# Patient Record
Sex: FEMALE | Race: WHITE | Employment: OTHER | URBAN - NONMETROPOLITAN AREA
[De-identification: names, ages, dates, MRNs, and addresses within clinical notes are randomized per-mention and may not be internally consistent; named-entity substitution may affect disease eponyms.]

---

## 2018-11-06 ENCOUNTER — HOSPITAL ENCOUNTER (OUTPATIENT)
Dept: NUCLEAR MEDICINE | Age: 43
Discharge: HOME | End: 2018-11-08
Payer: MEDICAID

## 2018-11-06 DIAGNOSIS — R91.1 SOLITARY LUNG NODULE: ICD-10-CM

## 2018-11-06 LAB
GLUCOSE BLD-MCNC: 154 MG/DL (ref 70–99)
PERFORMED ON: ABNORMAL

## 2018-11-06 PROCEDURE — 3430000000 HC RX DIAGNOSTIC RADIOPHARMACEUTICAL: Performed by: NURSE PRACTITIONER

## 2018-11-06 PROCEDURE — 82948 REAGENT STRIP/BLOOD GLUCOSE: CPT

## 2018-11-06 PROCEDURE — 78815 PET IMAGE W/CT SKULL-THIGH: CPT

## 2018-11-06 PROCEDURE — A9552 F18 FDG: HCPCS | Performed by: NURSE PRACTITIONER

## 2018-11-06 RX ORDER — FLUDEOXYGLUCOSE F 18 200 MCI/ML
10 INJECTION, SOLUTION INTRAVENOUS
Status: COMPLETED | OUTPATIENT
Start: 2018-11-06 | End: 2018-11-06

## 2018-11-06 RX ADMIN — FLUDEOXYGLUCOSE F 18 10 MILLICURIE: 200 INJECTION, SOLUTION INTRAVENOUS at 12:20

## 2019-01-19 ENCOUNTER — OUTSIDE FACILITY SERVICE (OUTPATIENT)
Dept: CARDIOLOGY | Facility: CLINIC | Age: 44
End: 2019-01-19

## 2019-01-19 PROCEDURE — 93306 TTE W/DOPPLER COMPLETE: CPT | Performed by: INTERNAL MEDICINE

## 2019-01-21 ENCOUNTER — OUTSIDE FACILITY SERVICE (OUTPATIENT)
Dept: CARDIOLOGY | Facility: CLINIC | Age: 44
End: 2019-01-21

## 2019-01-21 PROCEDURE — 99222 1ST HOSP IP/OBS MODERATE 55: CPT | Performed by: NURSE PRACTITIONER

## 2019-01-22 PROCEDURE — 99231 SBSQ HOSP IP/OBS SF/LOW 25: CPT | Performed by: NURSE PRACTITIONER

## 2019-01-23 PROCEDURE — 99231 SBSQ HOSP IP/OBS SF/LOW 25: CPT | Performed by: INTERNAL MEDICINE

## 2019-01-24 PROCEDURE — 99231 SBSQ HOSP IP/OBS SF/LOW 25: CPT | Performed by: INTERNAL MEDICINE

## 2019-01-25 ENCOUNTER — APPOINTMENT (OUTPATIENT)
Dept: CT IMAGING | Facility: HOSPITAL | Age: 44
End: 2019-01-25

## 2019-01-25 ENCOUNTER — HOSPITAL ENCOUNTER (INPATIENT)
Facility: HOSPITAL | Age: 44
LOS: 5 days | Discharge: HOME | End: 2019-01-30
Attending: FAMILY MEDICINE | Admitting: INTERNAL MEDICINE

## 2019-01-25 ENCOUNTER — APPOINTMENT (OUTPATIENT)
Dept: GENERAL RADIOLOGY | Facility: HOSPITAL | Age: 44
End: 2019-01-25

## 2019-01-25 DIAGNOSIS — Z74.09 IMPAIRED FUNCTIONAL MOBILITY AND ACTIVITY TOLERANCE: ICD-10-CM

## 2019-01-25 PROBLEM — E66.01 MORBID OBESITY WITH BMI OF 45.0-49.9, ADULT (HCC): Chronic | Status: ACTIVE | Noted: 2019-01-25

## 2019-01-25 PROBLEM — S30.1XXA RECTUS SHEATH HEMATOMA, INITIAL ENCOUNTER: Status: ACTIVE | Noted: 2019-01-25

## 2019-01-25 PROBLEM — IMO0001 TYPE 2 DM WITH CKD AND HYPERTENSION: Chronic | Status: ACTIVE | Noted: 2019-01-25

## 2019-01-25 LAB
ABO GROUP BLD: NORMAL
ALBUMIN SERPL-MCNC: 3.7 G/DL (ref 3.5–5)
ALBUMIN/GLOB SERPL: 1.2 G/DL (ref 1.1–2.5)
ALP SERPL-CCNC: 113 U/L (ref 24–120)
ALT SERPL W P-5'-P-CCNC: 23 U/L (ref 0–54)
ANION GAP SERPL CALCULATED.3IONS-SCNC: 10 MMOL/L (ref 4–13)
APTT PPP: 26.4 SECONDS (ref 24.1–34.8)
AST SERPL-CCNC: 33 U/L (ref 7–45)
BACTERIA UR QL AUTO: ABNORMAL /HPF
BILIRUB SERPL-MCNC: 1.1 MG/DL (ref 0.1–1)
BILIRUB UR QL STRIP: NEGATIVE
BLD GP AB SCN SERPL QL: NEGATIVE
BUN BLD-MCNC: 32 MG/DL (ref 5–21)
BUN/CREAT SERPL: 34.4 (ref 7–25)
CALCIUM SPEC-SCNC: 9 MG/DL (ref 8.4–10.4)
CHLORIDE SERPL-SCNC: 91 MMOL/L (ref 98–110)
CLARITY UR: CLEAR
CO2 SERPL-SCNC: 31 MMOL/L (ref 24–31)
COLOR UR: YELLOW
CREAT BLD-MCNC: 0.93 MG/DL (ref 0.5–1.4)
DEPRECATED RDW RBC AUTO: 48.3 FL (ref 40–54)
ERYTHROCYTE [DISTWIDTH] IN BLOOD BY AUTOMATED COUNT: 15.6 % (ref 12–15)
GFR SERPL CREATININE-BSD FRML MDRD: 65 ML/MIN/1.73
GLOBULIN UR ELPH-MCNC: 3.2 GM/DL
GLUCOSE BLD-MCNC: 283 MG/DL (ref 70–100)
GLUCOSE UR STRIP-MCNC: NEGATIVE MG/DL
HCT VFR BLD AUTO: 30.6 % (ref 37–47)
HGB BLD-MCNC: 10.1 G/DL (ref 12–16)
HGB UR QL STRIP.AUTO: ABNORMAL
HYALINE CASTS UR QL AUTO: ABNORMAL /LPF
INR PPP: 1.04 (ref 0.91–1.09)
KETONES UR QL STRIP: NEGATIVE
LEUKOCYTE ESTERASE UR QL STRIP.AUTO: ABNORMAL
MAGNESIUM SERPL-MCNC: 1.9 MG/DL (ref 1.4–2.2)
MCH RBC QN AUTO: 28.5 PG (ref 28–32)
MCHC RBC AUTO-ENTMCNC: 33 G/DL (ref 33–36)
MCV RBC AUTO: 86.4 FL (ref 82–98)
NITRITE UR QL STRIP: NEGATIVE
PH UR STRIP.AUTO: 6 [PH] (ref 5–8)
PHOSPHATE SERPL-MCNC: 3.5 MG/DL (ref 2.5–4.5)
PLATELET # BLD AUTO: 381 10*3/MM3 (ref 130–400)
PMV BLD AUTO: 10.7 FL (ref 6–12)
POTASSIUM BLD-SCNC: 4.7 MMOL/L (ref 3.5–5.3)
PROT SERPL-MCNC: 6.9 G/DL (ref 6.3–8.7)
PROT UR QL STRIP: ABNORMAL
PROTHROMBIN TIME: 13.9 SECONDS (ref 11.9–14.6)
RBC # BLD AUTO: 3.54 10*6/MM3 (ref 4.2–5.4)
RBC # UR: ABNORMAL /HPF
REF LAB TEST METHOD: ABNORMAL
RH BLD: NEGATIVE
SODIUM BLD-SCNC: 132 MMOL/L (ref 135–145)
SP GR UR STRIP: 1.02 (ref 1–1.03)
SQUAMOUS #/AREA URNS HPF: ABNORMAL /HPF
T&S EXPIRATION DATE: NORMAL
UROBILINOGEN UR QL STRIP: ABNORMAL
WBC NRBC COR # BLD: 24.96 10*3/MM3 (ref 4.8–10.8)
WBC UR QL AUTO: ABNORMAL /HPF

## 2019-01-25 PROCEDURE — 81001 URINALYSIS AUTO W/SCOPE: CPT | Performed by: INTERNAL MEDICINE

## 2019-01-25 PROCEDURE — 86901 BLOOD TYPING SEROLOGIC RH(D): CPT | Performed by: INTERNAL MEDICINE

## 2019-01-25 PROCEDURE — 74174 CTA ABD&PLVS W/CONTRAST: CPT

## 2019-01-25 PROCEDURE — 0 IOPAMIDOL PER 1 ML: Performed by: SURGERY

## 2019-01-25 PROCEDURE — 80053 COMPREHEN METABOLIC PANEL: CPT | Performed by: INTERNAL MEDICINE

## 2019-01-25 PROCEDURE — 83880 ASSAY OF NATRIURETIC PEPTIDE: CPT | Performed by: INTERNAL MEDICINE

## 2019-01-25 PROCEDURE — 86850 RBC ANTIBODY SCREEN: CPT | Performed by: INTERNAL MEDICINE

## 2019-01-25 PROCEDURE — 85027 COMPLETE CBC AUTOMATED: CPT | Performed by: INTERNAL MEDICINE

## 2019-01-25 PROCEDURE — 86900 BLOOD TYPING SEROLOGIC ABO: CPT | Performed by: INTERNAL MEDICINE

## 2019-01-25 PROCEDURE — 71045 X-RAY EXAM CHEST 1 VIEW: CPT

## 2019-01-25 PROCEDURE — 84100 ASSAY OF PHOSPHORUS: CPT | Performed by: INTERNAL MEDICINE

## 2019-01-25 PROCEDURE — 85730 THROMBOPLASTIN TIME PARTIAL: CPT | Performed by: INTERNAL MEDICINE

## 2019-01-25 PROCEDURE — 93010 ELECTROCARDIOGRAM REPORT: CPT | Performed by: INTERNAL MEDICINE

## 2019-01-25 PROCEDURE — 93005 ELECTROCARDIOGRAM TRACING: CPT | Performed by: INTERNAL MEDICINE

## 2019-01-25 PROCEDURE — 83735 ASSAY OF MAGNESIUM: CPT | Performed by: INTERNAL MEDICINE

## 2019-01-25 PROCEDURE — 85610 PROTHROMBIN TIME: CPT | Performed by: INTERNAL MEDICINE

## 2019-01-25 RX ORDER — ONDANSETRON 2 MG/ML
4 INJECTION INTRAMUSCULAR; INTRAVENOUS EVERY 6 HOURS PRN
Status: DISCONTINUED | OUTPATIENT
Start: 2019-01-25 | End: 2019-01-30 | Stop reason: HOSPADM

## 2019-01-25 RX ORDER — DEXTROSE MONOHYDRATE 50 MG/ML
100 INJECTION, SOLUTION INTRAVENOUS CONTINUOUS
Status: DISCONTINUED | OUTPATIENT
Start: 2019-01-25 | End: 2019-01-26

## 2019-01-25 RX ORDER — HYDROXYZINE PAMOATE 25 MG/1
25 CAPSULE ORAL 3 TIMES DAILY PRN
COMMUNITY
Start: 2017-11-24

## 2019-01-25 RX ORDER — TRAZODONE HYDROCHLORIDE 100 MG/1
100 TABLET ORAL DAILY
COMMUNITY
Start: 2017-11-24

## 2019-01-25 RX ORDER — GABAPENTIN 300 MG/1
600 CAPSULE ORAL DAILY
COMMUNITY
Start: 2017-11-17

## 2019-01-25 RX ORDER — IPRATROPIUM BROMIDE AND ALBUTEROL SULFATE 2.5; .5 MG/3ML; MG/3ML
3 SOLUTION RESPIRATORY (INHALATION) EVERY 6 HOURS PRN
Status: DISCONTINUED | OUTPATIENT
Start: 2019-01-25 | End: 2019-01-30 | Stop reason: HOSPADM

## 2019-01-25 RX ORDER — NICOTINE POLACRILEX 4 MG
15 LOZENGE BUCCAL
Status: DISCONTINUED | OUTPATIENT
Start: 2019-01-25 | End: 2019-01-30 | Stop reason: HOSPADM

## 2019-01-25 RX ORDER — BUDESONIDE AND FORMOTEROL FUMARATE DIHYDRATE 160; 4.5 UG/1; UG/1
2 AEROSOL RESPIRATORY (INHALATION)
COMMUNITY

## 2019-01-25 RX ORDER — SERTRALINE HYDROCHLORIDE 100 MG/1
100 TABLET, FILM COATED ORAL DAILY
COMMUNITY
Start: 2017-11-17

## 2019-01-25 RX ORDER — IPRATROPIUM BROMIDE AND ALBUTEROL SULFATE 2.5; .5 MG/3ML; MG/3ML
3 SOLUTION RESPIRATORY (INHALATION)
Status: DISCONTINUED | OUTPATIENT
Start: 2019-01-26 | End: 2019-01-26

## 2019-01-25 RX ORDER — SODIUM CHLORIDE 0.9 % (FLUSH) 0.9 %
3 SYRINGE (ML) INJECTION EVERY 12 HOURS SCHEDULED
Status: DISCONTINUED | OUTPATIENT
Start: 2019-01-25 | End: 2019-01-30 | Stop reason: HOSPADM

## 2019-01-25 RX ORDER — CETIRIZINE HYDROCHLORIDE 10 MG/1
10 TABLET ORAL DAILY
COMMUNITY
Start: 2017-12-12

## 2019-01-25 RX ORDER — ACETAMINOPHEN 325 MG/1
650 TABLET ORAL EVERY 4 HOURS PRN
Status: DISCONTINUED | OUTPATIENT
Start: 2019-01-25 | End: 2019-01-30 | Stop reason: HOSPADM

## 2019-01-25 RX ORDER — METOPROLOL SUCCINATE 25 MG/1
25 TABLET, EXTENDED RELEASE ORAL DAILY
COMMUNITY
Start: 2017-11-17

## 2019-01-25 RX ORDER — SIMVASTATIN 80 MG
80 TABLET ORAL DAILY
COMMUNITY
Start: 2017-12-08

## 2019-01-25 RX ORDER — CYCLOBENZAPRINE HCL 5 MG
5 TABLET ORAL 3 TIMES DAILY
COMMUNITY
Start: 2017-11-21

## 2019-01-25 RX ORDER — ONDANSETRON 4 MG/1
4 TABLET, FILM COATED ORAL EVERY 6 HOURS PRN
Status: DISCONTINUED | OUTPATIENT
Start: 2019-01-25 | End: 2019-01-30 | Stop reason: HOSPADM

## 2019-01-25 RX ORDER — PANTOPRAZOLE SODIUM 40 MG/1
40 TABLET, DELAYED RELEASE ORAL DAILY
COMMUNITY

## 2019-01-25 RX ORDER — GLIPIZIDE 5 MG/1
5 TABLET ORAL DAILY
COMMUNITY
Start: 2017-11-29

## 2019-01-25 RX ORDER — ACETAMINOPHEN 650 MG/1
650 SUPPOSITORY RECTAL EVERY 4 HOURS PRN
Status: DISCONTINUED | OUTPATIENT
Start: 2019-01-25 | End: 2019-01-30 | Stop reason: HOSPADM

## 2019-01-25 RX ORDER — LISINOPRIL 10 MG/1
10 TABLET ORAL DAILY
COMMUNITY

## 2019-01-25 RX ORDER — PREDNISONE 20 MG/1
20 TABLET ORAL DAILY
COMMUNITY
End: 2019-01-30 | Stop reason: HOSPADM

## 2019-01-25 RX ORDER — SODIUM CHLORIDE 0.9 % (FLUSH) 0.9 %
3-10 SYRINGE (ML) INJECTION AS NEEDED
Status: DISCONTINUED | OUTPATIENT
Start: 2019-01-25 | End: 2019-01-30 | Stop reason: HOSPADM

## 2019-01-25 RX ORDER — DOCUSATE SODIUM 100 MG/1
100 CAPSULE, LIQUID FILLED ORAL 2 TIMES DAILY PRN
Status: DISCONTINUED | OUTPATIENT
Start: 2019-01-25 | End: 2019-01-30 | Stop reason: HOSPADM

## 2019-01-25 RX ORDER — CEFDINIR 300 MG/1
300 CAPSULE ORAL 2 TIMES DAILY
COMMUNITY
End: 2019-01-30 | Stop reason: HOSPADM

## 2019-01-25 RX ORDER — ONDANSETRON 4 MG/1
4 TABLET, ORALLY DISINTEGRATING ORAL EVERY 6 HOURS PRN
Status: DISCONTINUED | OUTPATIENT
Start: 2019-01-25 | End: 2019-01-30 | Stop reason: HOSPADM

## 2019-01-25 RX ORDER — LEVALBUTEROL INHALATION SOLUTION 1.25 MG/3ML
1 SOLUTION RESPIRATORY (INHALATION) EVERY 8 HOURS PRN
COMMUNITY

## 2019-01-25 RX ORDER — DEXTROSE MONOHYDRATE 25 G/50ML
25 INJECTION, SOLUTION INTRAVENOUS
Status: DISCONTINUED | OUTPATIENT
Start: 2019-01-25 | End: 2019-01-30 | Stop reason: HOSPADM

## 2019-01-25 RX ADMIN — IOPAMIDOL 128 ML: 755 INJECTION, SOLUTION INTRAVENOUS at 22:18

## 2019-01-26 LAB
ALBUMIN SERPL-MCNC: 3.2 G/DL (ref 3.5–5)
ALBUMIN/GLOB SERPL: 1.1 G/DL (ref 1.1–2.5)
ALP SERPL-CCNC: 99 U/L (ref 24–120)
ALT SERPL W P-5'-P-CCNC: 27 U/L (ref 0–54)
ANION GAP SERPL CALCULATED.3IONS-SCNC: 12 MMOL/L (ref 4–13)
AST SERPL-CCNC: 23 U/L (ref 7–45)
BASOPHILS # BLD AUTO: 0.06 10*3/MM3 (ref 0–0.2)
BASOPHILS NFR BLD AUTO: 0.3 % (ref 0–2)
BILIRUB SERPL-MCNC: 1 MG/DL (ref 0.1–1)
BUN BLD-MCNC: 28 MG/DL (ref 5–21)
BUN/CREAT SERPL: 38.9 (ref 7–25)
CALCIUM SPEC-SCNC: 9 MG/DL (ref 8.4–10.4)
CHLORIDE SERPL-SCNC: 94 MMOL/L (ref 98–110)
CO2 SERPL-SCNC: 31 MMOL/L (ref 24–31)
CREAT BLD-MCNC: 0.72 MG/DL (ref 0.5–1.4)
DEPRECATED RDW RBC AUTO: 49.1 FL (ref 40–54)
EOSINOPHIL # BLD AUTO: 0.22 10*3/MM3 (ref 0–0.7)
EOSINOPHIL NFR BLD AUTO: 1 % (ref 0–4)
ERYTHROCYTE [DISTWIDTH] IN BLOOD BY AUTOMATED COUNT: 15.9 % (ref 12–15)
GFR SERPL CREATININE-BSD FRML MDRD: 88 ML/MIN/1.73
GLOBULIN UR ELPH-MCNC: 2.8 GM/DL
GLUCOSE BLD-MCNC: 186 MG/DL (ref 70–100)
GLUCOSE BLDC GLUCOMTR-MCNC: 161 MG/DL (ref 70–130)
GLUCOSE BLDC GLUCOMTR-MCNC: 177 MG/DL (ref 70–130)
GLUCOSE BLDC GLUCOMTR-MCNC: 232 MG/DL (ref 70–130)
GLUCOSE BLDC GLUCOMTR-MCNC: 301 MG/DL (ref 70–130)
GLUCOSE BLDC GLUCOMTR-MCNC: 322 MG/DL (ref 70–130)
HCT VFR BLD AUTO: 28.8 % (ref 37–47)
HCT VFR BLD AUTO: 29.6 % (ref 37–47)
HCT VFR BLD AUTO: 30.2 % (ref 37–47)
HCT VFR BLD AUTO: 30.9 % (ref 37–47)
HGB BLD-MCNC: 10 G/DL (ref 12–16)
HGB BLD-MCNC: 9.5 G/DL (ref 12–16)
HGB BLD-MCNC: 9.7 G/DL (ref 12–16)
HGB BLD-MCNC: 9.9 G/DL (ref 12–16)
IMM GRANULOCYTES # BLD AUTO: 0.21 10*3/MM3 (ref 0–0.03)
IMM GRANULOCYTES NFR BLD AUTO: 0.9 % (ref 0–5)
LYMPHOCYTES # BLD AUTO: 3.78 10*3/MM3 (ref 0.72–4.86)
LYMPHOCYTES NFR BLD AUTO: 17.1 % (ref 15–45)
MCH RBC QN AUTO: 28.6 PG (ref 28–32)
MCHC RBC AUTO-ENTMCNC: 32.8 G/DL (ref 33–36)
MCV RBC AUTO: 87.3 FL (ref 82–98)
MONOCYTES # BLD AUTO: 1.75 10*3/MM3 (ref 0.19–1.3)
MONOCYTES NFR BLD AUTO: 7.9 % (ref 4–12)
NEUTROPHILS # BLD AUTO: 16.11 10*3/MM3 (ref 1.87–8.4)
NEUTROPHILS NFR BLD AUTO: 72.8 % (ref 39–78)
NRBC BLD AUTO-RTO: 0 /100 WBC (ref 0–0)
NT-PROBNP SERPL-MCNC: 758 PG/ML (ref 0–450)
PHOSPHATE SERPL-MCNC: 3.6 MG/DL (ref 2.5–4.5)
PLATELET # BLD AUTO: 406 10*3/MM3 (ref 130–400)
PMV BLD AUTO: 10.5 FL (ref 6–12)
POTASSIUM BLD-SCNC: 4.4 MMOL/L (ref 3.5–5.3)
PROT SERPL-MCNC: 6 G/DL (ref 6.3–8.7)
RBC # BLD AUTO: 3.46 10*6/MM3 (ref 4.2–5.4)
SODIUM BLD-SCNC: 137 MMOL/L (ref 135–145)
WBC NRBC COR # BLD: 22.13 10*3/MM3 (ref 4.8–10.8)

## 2019-01-26 PROCEDURE — 94760 N-INVAS EAR/PLS OXIMETRY 1: CPT

## 2019-01-26 PROCEDURE — 25010000002 HYDROMORPHONE PER 4 MG: Performed by: INTERNAL MEDICINE

## 2019-01-26 PROCEDURE — 85014 HEMATOCRIT: CPT | Performed by: INTERNAL MEDICINE

## 2019-01-26 PROCEDURE — 94799 UNLISTED PULMONARY SVC/PX: CPT

## 2019-01-26 PROCEDURE — 82962 GLUCOSE BLOOD TEST: CPT

## 2019-01-26 PROCEDURE — 85025 COMPLETE CBC W/AUTO DIFF WBC: CPT | Performed by: INTERNAL MEDICINE

## 2019-01-26 PROCEDURE — 85018 HEMOGLOBIN: CPT | Performed by: INTERNAL MEDICINE

## 2019-01-26 PROCEDURE — 63710000001 INSULIN LISPRO (HUMAN) PER 5 UNITS: Performed by: INTERNAL MEDICINE

## 2019-01-26 PROCEDURE — 80053 COMPREHEN METABOLIC PANEL: CPT | Performed by: INTERNAL MEDICINE

## 2019-01-26 PROCEDURE — 84100 ASSAY OF PHOSPHORUS: CPT | Performed by: INTERNAL MEDICINE

## 2019-01-26 PROCEDURE — 63710000001 PREDNISONE PER 1 MG: Performed by: INTERNAL MEDICINE

## 2019-01-26 PROCEDURE — 94762 N-INVAS EAR/PLS OXIMTRY CONT: CPT

## 2019-01-26 PROCEDURE — 93005 ELECTROCARDIOGRAM TRACING: CPT | Performed by: INTERNAL MEDICINE

## 2019-01-26 PROCEDURE — 99222 1ST HOSP IP/OBS MODERATE 55: CPT | Performed by: SURGERY

## 2019-01-26 PROCEDURE — 63710000001 INSULIN DETEMIR PER 5 UNITS: Performed by: INTERNAL MEDICINE

## 2019-01-26 PROCEDURE — 94640 AIRWAY INHALATION TREATMENT: CPT

## 2019-01-26 PROCEDURE — 93010 ELECTROCARDIOGRAM REPORT: CPT | Performed by: INTERNAL MEDICINE

## 2019-01-26 RX ORDER — PREDNISONE 20 MG/1
20 TABLET ORAL DAILY
Status: DISCONTINUED | OUTPATIENT
Start: 2019-01-26 | End: 2019-01-29

## 2019-01-26 RX ORDER — SERTRALINE HYDROCHLORIDE 100 MG/1
100 TABLET, FILM COATED ORAL DAILY
Status: DISCONTINUED | OUTPATIENT
Start: 2019-01-26 | End: 2019-01-30 | Stop reason: HOSPADM

## 2019-01-26 RX ORDER — CEFDINIR 300 MG/1
300 CAPSULE ORAL EVERY 12 HOURS SCHEDULED
Status: COMPLETED | OUTPATIENT
Start: 2019-01-26 | End: 2019-01-28

## 2019-01-26 RX ORDER — HYDROXYZINE PAMOATE 25 MG/1
25 CAPSULE ORAL 4 TIMES DAILY PRN
Status: DISCONTINUED | OUTPATIENT
Start: 2019-01-25 | End: 2019-01-26 | Stop reason: SDUPTHER

## 2019-01-26 RX ORDER — METOPROLOL SUCCINATE 50 MG/1
50 TABLET, EXTENDED RELEASE ORAL DAILY
Status: DISCONTINUED | OUTPATIENT
Start: 2019-01-26 | End: 2019-01-27

## 2019-01-26 RX ORDER — GLIPIZIDE 5 MG/1
5 TABLET ORAL DAILY
Status: DISCONTINUED | OUTPATIENT
Start: 2019-01-26 | End: 2019-01-30 | Stop reason: HOSPADM

## 2019-01-26 RX ORDER — CYCLOBENZAPRINE HCL 5 MG
5 TABLET ORAL 3 TIMES DAILY
Status: DISCONTINUED | OUTPATIENT
Start: 2019-01-26 | End: 2019-01-30 | Stop reason: HOSPADM

## 2019-01-26 RX ORDER — SODIUM CHLORIDE 9 MG/ML
100 INJECTION, SOLUTION INTRAVENOUS CONTINUOUS
Status: DISCONTINUED | OUTPATIENT
Start: 2019-01-26 | End: 2019-01-26

## 2019-01-26 RX ORDER — BUDESONIDE AND FORMOTEROL FUMARATE DIHYDRATE 160; 4.5 UG/1; UG/1
2 AEROSOL RESPIRATORY (INHALATION)
Status: DISCONTINUED | OUTPATIENT
Start: 2019-01-26 | End: 2019-01-30 | Stop reason: HOSPADM

## 2019-01-26 RX ORDER — CETIRIZINE HYDROCHLORIDE 10 MG/1
10 TABLET ORAL DAILY
Status: DISCONTINUED | OUTPATIENT
Start: 2019-01-26 | End: 2019-01-30 | Stop reason: HOSPADM

## 2019-01-26 RX ORDER — DILTIAZEM HYDROCHLORIDE 5 MG/ML
10 INJECTION INTRAVENOUS ONCE
Status: COMPLETED | OUTPATIENT
Start: 2019-01-26 | End: 2019-01-26

## 2019-01-26 RX ORDER — PANTOPRAZOLE SODIUM 40 MG/1
40 TABLET, DELAYED RELEASE ORAL DAILY
Status: DISCONTINUED | OUTPATIENT
Start: 2019-01-26 | End: 2019-01-30 | Stop reason: HOSPADM

## 2019-01-26 RX ORDER — ATORVASTATIN CALCIUM 10 MG/1
20 TABLET, FILM COATED ORAL DAILY
Status: DISCONTINUED | OUTPATIENT
Start: 2019-01-26 | End: 2019-01-30 | Stop reason: HOSPADM

## 2019-01-26 RX ORDER — LISINOPRIL 10 MG/1
10 TABLET ORAL DAILY
Status: DISCONTINUED | OUTPATIENT
Start: 2019-01-26 | End: 2019-01-30 | Stop reason: HOSPADM

## 2019-01-26 RX ORDER — GABAPENTIN 300 MG/1
600 CAPSULE ORAL DAILY
Status: DISCONTINUED | OUTPATIENT
Start: 2019-01-26 | End: 2019-01-30 | Stop reason: HOSPADM

## 2019-01-26 RX ORDER — HYDROXYZINE HYDROCHLORIDE 25 MG/1
25 TABLET, FILM COATED ORAL 4 TIMES DAILY PRN
Status: DISCONTINUED | OUTPATIENT
Start: 2019-01-26 | End: 2019-01-30 | Stop reason: HOSPADM

## 2019-01-26 RX ORDER — HYDROMORPHONE HYDROCHLORIDE 1 MG/ML
1 INJECTION, SOLUTION INTRAMUSCULAR; INTRAVENOUS; SUBCUTANEOUS
Status: DISCONTINUED | OUTPATIENT
Start: 2019-01-26 | End: 2019-01-30 | Stop reason: HOSPADM

## 2019-01-26 RX ORDER — LEVALBUTEROL INHALATION SOLUTION 1.25 MG/3ML
3 SOLUTION RESPIRATORY (INHALATION)
Status: DISCONTINUED | OUTPATIENT
Start: 2019-01-26 | End: 2019-01-30 | Stop reason: HOSPADM

## 2019-01-26 RX ORDER — TRAZODONE HYDROCHLORIDE 100 MG/1
100 TABLET ORAL DAILY
Status: DISCONTINUED | OUTPATIENT
Start: 2019-01-26 | End: 2019-01-30 | Stop reason: HOSPADM

## 2019-01-26 RX ORDER — SODIUM CHLORIDE 9 MG/ML
75 INJECTION, SOLUTION INTRAVENOUS CONTINUOUS
Status: DISCONTINUED | OUTPATIENT
Start: 2019-01-26 | End: 2019-01-28

## 2019-01-26 RX ADMIN — DILTIAZEM HYDROCHLORIDE 30 MG: 30 TABLET, FILM COATED ORAL at 00:48

## 2019-01-26 RX ADMIN — BUDESONIDE AND FORMOTEROL FUMARATE DIHYDRATE 2 PUFF: 160; 4.5 AEROSOL RESPIRATORY (INHALATION) at 19:17

## 2019-01-26 RX ADMIN — INSULIN LISPRO 3 UNITS: 100 INJECTION, SOLUTION INTRAVENOUS; SUBCUTANEOUS at 08:19

## 2019-01-26 RX ADMIN — CYCLOBENZAPRINE HYDROCHLORIDE 5 MG: 5 TABLET, FILM COATED ORAL at 15:43

## 2019-01-26 RX ADMIN — LEVALBUTEROL HYDROCHLORIDE 3 ML: 1.25 SOLUTION RESPIRATORY (INHALATION) at 07:00

## 2019-01-26 RX ADMIN — CEFDINIR 300 MG: 300 CAPSULE ORAL at 08:20

## 2019-01-26 RX ADMIN — DILTIAZEM HYDROCHLORIDE 10 MG: 5 INJECTION INTRAVENOUS at 02:15

## 2019-01-26 RX ADMIN — INSULIN LISPRO 3 UNITS: 100 INJECTION, SOLUTION INTRAVENOUS; SUBCUTANEOUS at 13:06

## 2019-01-26 RX ADMIN — INSULIN DETEMIR 40 UNITS: 100 INJECTION, SOLUTION SUBCUTANEOUS at 00:52

## 2019-01-26 RX ADMIN — CYCLOBENZAPRINE HYDROCHLORIDE 5 MG: 5 TABLET, FILM COATED ORAL at 08:19

## 2019-01-26 RX ADMIN — ATORVASTATIN CALCIUM 20 MG: 10 TABLET, FILM COATED ORAL at 08:20

## 2019-01-26 RX ADMIN — CEFDINIR 300 MG: 300 CAPSULE ORAL at 21:37

## 2019-01-26 RX ADMIN — METOPROLOL SUCCINATE 50 MG: 50 TABLET, FILM COATED, EXTENDED RELEASE ORAL at 08:36

## 2019-01-26 RX ADMIN — HYDROMORPHONE HYDROCHLORIDE 1 MG: 1 INJECTION, SOLUTION INTRAMUSCULAR; INTRAVENOUS; SUBCUTANEOUS at 18:45

## 2019-01-26 RX ADMIN — INSULIN DETEMIR 40 UNITS: 100 INJECTION, SOLUTION SUBCUTANEOUS at 21:31

## 2019-01-26 RX ADMIN — GLIPIZIDE 5 MG: 5 TABLET ORAL at 08:36

## 2019-01-26 RX ADMIN — SERTRALINE 100 MG: 100 TABLET, FILM COATED ORAL at 08:37

## 2019-01-26 RX ADMIN — LEVALBUTEROL HYDROCHLORIDE 3 ML: 1.25 SOLUTION RESPIRATORY (INHALATION) at 19:16

## 2019-01-26 RX ADMIN — HYDROMORPHONE HYDROCHLORIDE 1 MG: 1 INJECTION, SOLUTION INTRAMUSCULAR; INTRAVENOUS; SUBCUTANEOUS at 00:05

## 2019-01-26 RX ADMIN — PANTOPRAZOLE SODIUM 40 MG: 40 TABLET, DELAYED RELEASE ORAL at 08:46

## 2019-01-26 RX ADMIN — INSULIN LISPRO 5 UNITS: 100 INJECTION, SOLUTION INTRAVENOUS; SUBCUTANEOUS at 21:31

## 2019-01-26 RX ADMIN — SODIUM CHLORIDE 75 ML/HR: 9 INJECTION, SOLUTION INTRAVENOUS at 14:33

## 2019-01-26 RX ADMIN — INSULIN LISPRO 10 UNITS: 100 INJECTION, SOLUTION INTRAVENOUS; SUBCUTANEOUS at 17:15

## 2019-01-26 RX ADMIN — CEFDINIR 300 MG: 300 CAPSULE ORAL at 00:48

## 2019-01-26 RX ADMIN — PREDNISONE 20 MG: 20 TABLET ORAL at 08:37

## 2019-01-26 RX ADMIN — SODIUM CHLORIDE 100 ML/HR: 9 INJECTION, SOLUTION INTRAVENOUS at 00:48

## 2019-01-26 RX ADMIN — LEVALBUTEROL HYDROCHLORIDE 3 ML: 1.25 SOLUTION RESPIRATORY (INHALATION) at 13:23

## 2019-01-26 RX ADMIN — IPRATROPIUM BROMIDE AND ALBUTEROL SULFATE 3 ML: 2.5; .5 SOLUTION RESPIRATORY (INHALATION) at 01:04

## 2019-01-26 RX ADMIN — HYDROMORPHONE HYDROCHLORIDE 1 MG: 1 INJECTION, SOLUTION INTRAMUSCULAR; INTRAVENOUS; SUBCUTANEOUS at 21:31

## 2019-01-26 RX ADMIN — TRAZODONE HYDROCHLORIDE 100 MG: 100 TABLET ORAL at 08:46

## 2019-01-26 RX ADMIN — LEVALBUTEROL HYDROCHLORIDE 3 ML: 1.25 SOLUTION RESPIRATORY (INHALATION) at 02:55

## 2019-01-26 RX ADMIN — CYCLOBENZAPRINE HYDROCHLORIDE 5 MG: 5 TABLET, FILM COATED ORAL at 21:37

## 2019-01-26 RX ADMIN — GABAPENTIN 600 MG: 300 CAPSULE ORAL at 08:19

## 2019-01-26 RX ADMIN — SODIUM CHLORIDE, PRESERVATIVE FREE 3 ML: 5 INJECTION INTRAVENOUS at 08:42

## 2019-01-26 RX ADMIN — LISINOPRIL 10 MG: 10 TABLET ORAL at 08:19

## 2019-01-26 RX ADMIN — HYDROMORPHONE HYDROCHLORIDE 1 MG: 1 INJECTION, SOLUTION INTRAMUSCULAR; INTRAVENOUS; SUBCUTANEOUS at 08:36

## 2019-01-26 RX ADMIN — HYDROMORPHONE HYDROCHLORIDE 1 MG: 1 INJECTION, SOLUTION INTRAMUSCULAR; INTRAVENOUS; SUBCUTANEOUS at 14:28

## 2019-01-26 RX ADMIN — CETIRIZINE HYDROCHLORIDE 10 MG: 10 TABLET, FILM COATED ORAL at 08:20

## 2019-01-27 LAB
BASOPHILS # BLD AUTO: 0.06 10*3/MM3 (ref 0–0.2)
BASOPHILS NFR BLD AUTO: 0.3 % (ref 0–2)
DEPRECATED RDW RBC AUTO: 52 FL (ref 40–54)
EOSINOPHIL # BLD AUTO: 0.35 10*3/MM3 (ref 0–0.7)
EOSINOPHIL NFR BLD AUTO: 1.8 % (ref 0–4)
ERYTHROCYTE [DISTWIDTH] IN BLOOD BY AUTOMATED COUNT: 16.1 % (ref 12–15)
GLUCOSE BLDC GLUCOMTR-MCNC: 126 MG/DL (ref 70–130)
GLUCOSE BLDC GLUCOMTR-MCNC: 195 MG/DL (ref 70–130)
GLUCOSE BLDC GLUCOMTR-MCNC: 279 MG/DL (ref 70–130)
GLUCOSE BLDC GLUCOMTR-MCNC: 295 MG/DL (ref 70–130)
HCT VFR BLD AUTO: 29.7 % (ref 37–47)
HCT VFR BLD AUTO: 29.8 % (ref 37–47)
HCT VFR BLD AUTO: 30.9 % (ref 37–47)
HCT VFR BLD AUTO: 31 % (ref 37–47)
HCT VFR BLD AUTO: 31 % (ref 37–47)
HGB BLD-MCNC: 9.5 G/DL (ref 12–16)
HGB BLD-MCNC: 9.6 G/DL (ref 12–16)
HGB BLD-MCNC: 9.6 G/DL (ref 12–16)
HGB BLD-MCNC: 9.7 G/DL (ref 12–16)
HGB BLD-MCNC: 9.7 G/DL (ref 12–16)
IMM GRANULOCYTES # BLD AUTO: 0.17 10*3/MM3 (ref 0–0.03)
IMM GRANULOCYTES NFR BLD AUTO: 0.9 % (ref 0–5)
LYMPHOCYTES # BLD AUTO: 3.27 10*3/MM3 (ref 0.72–4.86)
LYMPHOCYTES NFR BLD AUTO: 16.8 % (ref 15–45)
MCH RBC QN AUTO: 27.9 PG (ref 28–32)
MCHC RBC AUTO-ENTMCNC: 31.3 G/DL (ref 33–36)
MCV RBC AUTO: 89.1 FL (ref 82–98)
MONOCYTES # BLD AUTO: 1.87 10*3/MM3 (ref 0.19–1.3)
MONOCYTES NFR BLD AUTO: 9.6 % (ref 4–12)
NEUTROPHILS # BLD AUTO: 13.8 10*3/MM3 (ref 1.87–8.4)
NEUTROPHILS NFR BLD AUTO: 70.6 % (ref 39–78)
NRBC BLD AUTO-RTO: 0 /100 WBC (ref 0–0)
PLATELET # BLD AUTO: 414 10*3/MM3 (ref 130–400)
PMV BLD AUTO: 9.9 FL (ref 6–12)
RBC # BLD AUTO: 3.48 10*6/MM3 (ref 4.2–5.4)
T4 FREE SERPL-MCNC: 1.2 NG/DL (ref 0.78–2.19)
TSH SERPL DL<=0.05 MIU/L-ACNC: 1.91 MIU/ML (ref 0.47–4.68)
WBC NRBC COR # BLD: 19.52 10*3/MM3 (ref 4.8–10.8)

## 2019-01-27 PROCEDURE — 63710000001 INSULIN LISPRO (HUMAN) PER 5 UNITS: Performed by: INTERNAL MEDICINE

## 2019-01-27 PROCEDURE — 82962 GLUCOSE BLOOD TEST: CPT

## 2019-01-27 PROCEDURE — 84443 ASSAY THYROID STIM HORMONE: CPT | Performed by: INTERNAL MEDICINE

## 2019-01-27 PROCEDURE — 94760 N-INVAS EAR/PLS OXIMETRY 1: CPT

## 2019-01-27 PROCEDURE — 63710000001 INSULIN DETEMIR PER 5 UNITS: Performed by: INTERNAL MEDICINE

## 2019-01-27 PROCEDURE — 94799 UNLISTED PULMONARY SVC/PX: CPT

## 2019-01-27 PROCEDURE — 85018 HEMOGLOBIN: CPT | Performed by: INTERNAL MEDICINE

## 2019-01-27 PROCEDURE — 85025 COMPLETE CBC W/AUTO DIFF WBC: CPT | Performed by: INTERNAL MEDICINE

## 2019-01-27 PROCEDURE — 63710000001 PREDNISONE PER 1 MG: Performed by: INTERNAL MEDICINE

## 2019-01-27 PROCEDURE — 94762 N-INVAS EAR/PLS OXIMTRY CONT: CPT

## 2019-01-27 PROCEDURE — 85014 HEMATOCRIT: CPT | Performed by: INTERNAL MEDICINE

## 2019-01-27 PROCEDURE — 25010000002 HYDROMORPHONE PER 4 MG: Performed by: INTERNAL MEDICINE

## 2019-01-27 PROCEDURE — 99233 SBSQ HOSP IP/OBS HIGH 50: CPT | Performed by: SURGERY

## 2019-01-27 PROCEDURE — 93010 ELECTROCARDIOGRAM REPORT: CPT | Performed by: INTERNAL MEDICINE

## 2019-01-27 PROCEDURE — 84439 ASSAY OF FREE THYROXINE: CPT | Performed by: INTERNAL MEDICINE

## 2019-01-27 PROCEDURE — 93005 ELECTROCARDIOGRAM TRACING: CPT | Performed by: INTERNAL MEDICINE

## 2019-01-27 RX ORDER — METOPROLOL TARTRATE 50 MG/1
50 TABLET, FILM COATED ORAL ONCE
Status: COMPLETED | OUTPATIENT
Start: 2019-01-27 | End: 2019-01-27

## 2019-01-27 RX ORDER — METOPROLOL SUCCINATE 100 MG/1
100 TABLET, EXTENDED RELEASE ORAL DAILY
Status: DISCONTINUED | OUTPATIENT
Start: 2019-01-28 | End: 2019-01-30 | Stop reason: HOSPADM

## 2019-01-27 RX ADMIN — CYCLOBENZAPRINE HYDROCHLORIDE 5 MG: 5 TABLET, FILM COATED ORAL at 15:16

## 2019-01-27 RX ADMIN — GLIPIZIDE 5 MG: 5 TABLET ORAL at 08:12

## 2019-01-27 RX ADMIN — INSULIN LISPRO 8 UNITS: 100 INJECTION, SOLUTION INTRAVENOUS; SUBCUTANEOUS at 17:29

## 2019-01-27 RX ADMIN — HYDROMORPHONE HYDROCHLORIDE 1 MG: 1 INJECTION, SOLUTION INTRAMUSCULAR; INTRAVENOUS; SUBCUTANEOUS at 06:07

## 2019-01-27 RX ADMIN — SERTRALINE 100 MG: 100 TABLET, FILM COATED ORAL at 08:12

## 2019-01-27 RX ADMIN — CEFDINIR 300 MG: 300 CAPSULE ORAL at 20:02

## 2019-01-27 RX ADMIN — HYDROMORPHONE HYDROCHLORIDE 1 MG: 1 INJECTION, SOLUTION INTRAMUSCULAR; INTRAVENOUS; SUBCUTANEOUS at 13:58

## 2019-01-27 RX ADMIN — HYDROMORPHONE HYDROCHLORIDE 1 MG: 1 INJECTION, SOLUTION INTRAMUSCULAR; INTRAVENOUS; SUBCUTANEOUS at 20:02

## 2019-01-27 RX ADMIN — METOPROLOL SUCCINATE 50 MG: 50 TABLET, FILM COATED, EXTENDED RELEASE ORAL at 08:12

## 2019-01-27 RX ADMIN — ATORVASTATIN CALCIUM 20 MG: 10 TABLET, FILM COATED ORAL at 08:12

## 2019-01-27 RX ADMIN — PANTOPRAZOLE SODIUM 40 MG: 40 TABLET, DELAYED RELEASE ORAL at 08:13

## 2019-01-27 RX ADMIN — DILTIAZEM HYDROCHLORIDE 5 MG/HR: 5 INJECTION INTRAVENOUS at 09:14

## 2019-01-27 RX ADMIN — INSULIN DETEMIR 40 UNITS: 100 INJECTION, SOLUTION SUBCUTANEOUS at 20:19

## 2019-01-27 RX ADMIN — SODIUM CHLORIDE 75 ML/HR: 9 INJECTION, SOLUTION INTRAVENOUS at 02:27

## 2019-01-27 RX ADMIN — INSULIN LISPRO 8 UNITS: 100 INJECTION, SOLUTION INTRAVENOUS; SUBCUTANEOUS at 12:14

## 2019-01-27 RX ADMIN — CETIRIZINE HYDROCHLORIDE 10 MG: 10 TABLET, FILM COATED ORAL at 08:12

## 2019-01-27 RX ADMIN — BUDESONIDE AND FORMOTEROL FUMARATE DIHYDRATE 2 PUFF: 160; 4.5 AEROSOL RESPIRATORY (INHALATION) at 18:33

## 2019-01-27 RX ADMIN — TRAZODONE HYDROCHLORIDE 100 MG: 100 TABLET ORAL at 08:12

## 2019-01-27 RX ADMIN — METOPROLOL TARTRATE 50 MG: 50 TABLET ORAL at 15:32

## 2019-01-27 RX ADMIN — BUDESONIDE AND FORMOTEROL FUMARATE DIHYDRATE 2 PUFF: 160; 4.5 AEROSOL RESPIRATORY (INHALATION) at 06:54

## 2019-01-27 RX ADMIN — LEVALBUTEROL HYDROCHLORIDE 3 ML: 1.25 SOLUTION RESPIRATORY (INHALATION) at 06:55

## 2019-01-27 RX ADMIN — HYDROMORPHONE HYDROCHLORIDE 1 MG: 1 INJECTION, SOLUTION INTRAMUSCULAR; INTRAVENOUS; SUBCUTANEOUS at 02:30

## 2019-01-27 RX ADMIN — LEVALBUTEROL HYDROCHLORIDE 3 ML: 1.25 SOLUTION RESPIRATORY (INHALATION) at 18:32

## 2019-01-27 RX ADMIN — CYCLOBENZAPRINE HYDROCHLORIDE 5 MG: 5 TABLET, FILM COATED ORAL at 20:02

## 2019-01-27 RX ADMIN — HYDROMORPHONE HYDROCHLORIDE 1 MG: 1 INJECTION, SOLUTION INTRAMUSCULAR; INTRAVENOUS; SUBCUTANEOUS at 09:05

## 2019-01-27 RX ADMIN — SODIUM CHLORIDE 75 ML/HR: 9 INJECTION, SOLUTION INTRAVENOUS at 15:32

## 2019-01-27 RX ADMIN — PREDNISONE 20 MG: 20 TABLET ORAL at 08:12

## 2019-01-27 RX ADMIN — CEFDINIR 300 MG: 300 CAPSULE ORAL at 08:12

## 2019-01-27 RX ADMIN — HYDROMORPHONE HYDROCHLORIDE 1 MG: 1 INJECTION, SOLUTION INTRAMUSCULAR; INTRAVENOUS; SUBCUTANEOUS at 17:41

## 2019-01-27 RX ADMIN — CYCLOBENZAPRINE HYDROCHLORIDE 5 MG: 5 TABLET, FILM COATED ORAL at 08:12

## 2019-01-27 RX ADMIN — LISINOPRIL 10 MG: 10 TABLET ORAL at 08:12

## 2019-01-27 RX ADMIN — GABAPENTIN 600 MG: 300 CAPSULE ORAL at 08:13

## 2019-01-27 RX ADMIN — INSULIN LISPRO 3 UNITS: 100 INJECTION, SOLUTION INTRAVENOUS; SUBCUTANEOUS at 20:19

## 2019-01-28 LAB
BASOPHILS # BLD AUTO: 0.06 10*3/MM3 (ref 0–0.2)
BASOPHILS NFR BLD AUTO: 0.3 % (ref 0–2)
DEPRECATED RDW RBC AUTO: 50.6 FL (ref 40–54)
EOSINOPHIL # BLD AUTO: 0.42 10*3/MM3 (ref 0–0.7)
EOSINOPHIL NFR BLD AUTO: 2.3 % (ref 0–4)
ERYTHROCYTE [DISTWIDTH] IN BLOOD BY AUTOMATED COUNT: 15.9 % (ref 12–15)
GLUCOSE BLDC GLUCOMTR-MCNC: 175 MG/DL (ref 70–130)
GLUCOSE BLDC GLUCOMTR-MCNC: 216 MG/DL (ref 70–130)
GLUCOSE BLDC GLUCOMTR-MCNC: 227 MG/DL (ref 70–130)
GLUCOSE BLDC GLUCOMTR-MCNC: 249 MG/DL (ref 70–130)
GLUCOSE BLDC GLUCOMTR-MCNC: 256 MG/DL (ref 70–130)
HCT VFR BLD AUTO: 30 % (ref 37–47)
HCT VFR BLD AUTO: 30 % (ref 37–47)
HGB BLD-MCNC: 9.5 G/DL (ref 12–16)
HGB BLD-MCNC: 9.5 G/DL (ref 12–16)
IMM GRANULOCYTES # BLD AUTO: 0.21 10*3/MM3 (ref 0–0.03)
IMM GRANULOCYTES NFR BLD AUTO: 1.1 % (ref 0–5)
LYMPHOCYTES # BLD AUTO: 3.83 10*3/MM3 (ref 0.72–4.86)
LYMPHOCYTES NFR BLD AUTO: 20.8 % (ref 15–45)
MCH RBC QN AUTO: 27.7 PG (ref 28–32)
MCHC RBC AUTO-ENTMCNC: 31.7 G/DL (ref 33–36)
MCV RBC AUTO: 87.5 FL (ref 82–98)
MONOCYTES # BLD AUTO: 1.58 10*3/MM3 (ref 0.19–1.3)
MONOCYTES NFR BLD AUTO: 8.6 % (ref 4–12)
NEUTROPHILS # BLD AUTO: 12.35 10*3/MM3 (ref 1.87–8.4)
NEUTROPHILS NFR BLD AUTO: 66.9 % (ref 39–78)
NRBC BLD AUTO-RTO: 0 /100 WBC (ref 0–0)
PLATELET # BLD AUTO: 453 10*3/MM3 (ref 130–400)
PMV BLD AUTO: 10.2 FL (ref 6–12)
RBC # BLD AUTO: 3.43 10*6/MM3 (ref 4.2–5.4)
WBC NRBC COR # BLD: 18.45 10*3/MM3 (ref 4.8–10.8)

## 2019-01-28 PROCEDURE — 82962 GLUCOSE BLOOD TEST: CPT

## 2019-01-28 PROCEDURE — 25010000002 HYDROMORPHONE PER 4 MG: Performed by: INTERNAL MEDICINE

## 2019-01-28 PROCEDURE — 94760 N-INVAS EAR/PLS OXIMETRY 1: CPT

## 2019-01-28 PROCEDURE — 94799 UNLISTED PULMONARY SVC/PX: CPT

## 2019-01-28 PROCEDURE — 63710000001 PREDNISONE PER 1 MG: Performed by: INTERNAL MEDICINE

## 2019-01-28 PROCEDURE — 97161 PT EVAL LOW COMPLEX 20 MIN: CPT

## 2019-01-28 PROCEDURE — 85025 COMPLETE CBC W/AUTO DIFF WBC: CPT | Performed by: INTERNAL MEDICINE

## 2019-01-28 PROCEDURE — 63710000001 INSULIN LISPRO (HUMAN) PER 5 UNITS: Performed by: INTERNAL MEDICINE

## 2019-01-28 PROCEDURE — 63710000001 INSULIN DETEMIR PER 5 UNITS: Performed by: INTERNAL MEDICINE

## 2019-01-28 RX ORDER — INSULIN GLARGINE 100 [IU]/ML
32 INJECTION, SOLUTION SUBCUTANEOUS NIGHTLY
COMMUNITY

## 2019-01-28 RX ORDER — PIOGLITAZONEHYDROCHLORIDE 30 MG/1
30 TABLET ORAL DAILY
COMMUNITY

## 2019-01-28 RX ORDER — LOSARTAN POTASSIUM 100 MG/1
100 TABLET ORAL DAILY
COMMUNITY
End: 2019-01-30 | Stop reason: HOSPADM

## 2019-01-28 RX ADMIN — HYDROMORPHONE HYDROCHLORIDE 1 MG: 1 INJECTION, SOLUTION INTRAMUSCULAR; INTRAVENOUS; SUBCUTANEOUS at 05:03

## 2019-01-28 RX ADMIN — CEFDINIR 300 MG: 300 CAPSULE ORAL at 20:04

## 2019-01-28 RX ADMIN — GABAPENTIN 600 MG: 300 CAPSULE ORAL at 08:24

## 2019-01-28 RX ADMIN — CYCLOBENZAPRINE HYDROCHLORIDE 5 MG: 5 TABLET, FILM COATED ORAL at 08:25

## 2019-01-28 RX ADMIN — GLIPIZIDE 5 MG: 5 TABLET ORAL at 08:24

## 2019-01-28 RX ADMIN — ATORVASTATIN CALCIUM 20 MG: 10 TABLET, FILM COATED ORAL at 08:25

## 2019-01-28 RX ADMIN — INSULIN LISPRO 8 UNITS: 100 INJECTION, SOLUTION INTRAVENOUS; SUBCUTANEOUS at 20:04

## 2019-01-28 RX ADMIN — HYDROMORPHONE HYDROCHLORIDE 1 MG: 1 INJECTION, SOLUTION INTRAMUSCULAR; INTRAVENOUS; SUBCUTANEOUS at 01:20

## 2019-01-28 RX ADMIN — LISINOPRIL 10 MG: 10 TABLET ORAL at 08:24

## 2019-01-28 RX ADMIN — INSULIN LISPRO 5 UNITS: 100 INJECTION, SOLUTION INTRAVENOUS; SUBCUTANEOUS at 12:21

## 2019-01-28 RX ADMIN — INSULIN LISPRO 3 UNITS: 100 INJECTION, SOLUTION INTRAVENOUS; SUBCUTANEOUS at 08:25

## 2019-01-28 RX ADMIN — SODIUM CHLORIDE 75 ML/HR: 9 INJECTION, SOLUTION INTRAVENOUS at 05:03

## 2019-01-28 RX ADMIN — INSULIN DETEMIR 40 UNITS: 100 INJECTION, SOLUTION SUBCUTANEOUS at 20:03

## 2019-01-28 RX ADMIN — BUDESONIDE AND FORMOTEROL FUMARATE DIHYDRATE 2 PUFF: 160; 4.5 AEROSOL RESPIRATORY (INHALATION) at 19:51

## 2019-01-28 RX ADMIN — BUDESONIDE AND FORMOTEROL FUMARATE DIHYDRATE 2 PUFF: 160; 4.5 AEROSOL RESPIRATORY (INHALATION) at 07:45

## 2019-01-28 RX ADMIN — LEVALBUTEROL HYDROCHLORIDE 3 ML: 1.25 SOLUTION RESPIRATORY (INHALATION) at 07:45

## 2019-01-28 RX ADMIN — SODIUM CHLORIDE, PRESERVATIVE FREE 3 ML: 5 INJECTION INTRAVENOUS at 08:24

## 2019-01-28 RX ADMIN — TRAZODONE HYDROCHLORIDE 100 MG: 100 TABLET ORAL at 08:24

## 2019-01-28 RX ADMIN — PANTOPRAZOLE SODIUM 40 MG: 40 TABLET, DELAYED RELEASE ORAL at 08:25

## 2019-01-28 RX ADMIN — HYDROMORPHONE HYDROCHLORIDE 1 MG: 1 INJECTION, SOLUTION INTRAMUSCULAR; INTRAVENOUS; SUBCUTANEOUS at 20:04

## 2019-01-28 RX ADMIN — CETIRIZINE HYDROCHLORIDE 10 MG: 10 TABLET, FILM COATED ORAL at 08:25

## 2019-01-28 RX ADMIN — CYCLOBENZAPRINE HYDROCHLORIDE 5 MG: 5 TABLET, FILM COATED ORAL at 20:04

## 2019-01-28 RX ADMIN — PREDNISONE 20 MG: 20 TABLET ORAL at 08:24

## 2019-01-28 RX ADMIN — CYCLOBENZAPRINE HYDROCHLORIDE 5 MG: 5 TABLET, FILM COATED ORAL at 16:38

## 2019-01-28 RX ADMIN — METOPROLOL SUCCINATE 100 MG: 100 TABLET, FILM COATED, EXTENDED RELEASE ORAL at 08:24

## 2019-01-28 RX ADMIN — LEVALBUTEROL HYDROCHLORIDE 3 ML: 1.25 SOLUTION RESPIRATORY (INHALATION) at 11:46

## 2019-01-28 RX ADMIN — HYDROMORPHONE HYDROCHLORIDE 1 MG: 1 INJECTION, SOLUTION INTRAMUSCULAR; INTRAVENOUS; SUBCUTANEOUS at 14:29

## 2019-01-28 RX ADMIN — LEVALBUTEROL HYDROCHLORIDE 3 ML: 1.25 SOLUTION RESPIRATORY (INHALATION) at 19:50

## 2019-01-28 RX ADMIN — SERTRALINE 100 MG: 100 TABLET, FILM COATED ORAL at 08:24

## 2019-01-28 RX ADMIN — CEFDINIR 300 MG: 300 CAPSULE ORAL at 08:25

## 2019-01-28 RX ADMIN — HYDROMORPHONE HYDROCHLORIDE 1 MG: 1 INJECTION, SOLUTION INTRAMUSCULAR; INTRAVENOUS; SUBCUTANEOUS at 09:40

## 2019-01-28 RX ADMIN — ACETAMINOPHEN 650 MG: 325 TABLET, FILM COATED ORAL at 18:26

## 2019-01-28 RX ADMIN — INSULIN LISPRO 5 UNITS: 100 INJECTION, SOLUTION INTRAVENOUS; SUBCUTANEOUS at 17:48

## 2019-01-29 LAB
ANION GAP SERPL CALCULATED.3IONS-SCNC: 8 MMOL/L (ref 4–13)
BASOPHILS # BLD AUTO: 0.05 10*3/MM3 (ref 0–0.2)
BASOPHILS NFR BLD AUTO: 0.3 % (ref 0–2)
BUN BLD-MCNC: 17 MG/DL (ref 5–21)
BUN/CREAT SERPL: 29.8 (ref 7–25)
CALCIUM SPEC-SCNC: 9 MG/DL (ref 8.4–10.4)
CHLORIDE SERPL-SCNC: 96 MMOL/L (ref 98–110)
CO2 SERPL-SCNC: 33 MMOL/L (ref 24–31)
CREAT BLD-MCNC: 0.57 MG/DL (ref 0.5–1.4)
DEPRECATED RDW RBC AUTO: 49.6 FL (ref 40–54)
EOSINOPHIL # BLD AUTO: 0.3 10*3/MM3 (ref 0–0.7)
EOSINOPHIL NFR BLD AUTO: 1.7 % (ref 0–4)
ERYTHROCYTE [DISTWIDTH] IN BLOOD BY AUTOMATED COUNT: 15.5 % (ref 12–15)
GFR SERPL CREATININE-BSD FRML MDRD: 115 ML/MIN/1.73
GLUCOSE BLD-MCNC: 159 MG/DL (ref 70–100)
GLUCOSE BLDC GLUCOMTR-MCNC: 135 MG/DL (ref 70–130)
GLUCOSE BLDC GLUCOMTR-MCNC: 232 MG/DL (ref 70–130)
GLUCOSE BLDC GLUCOMTR-MCNC: 256 MG/DL (ref 70–130)
GLUCOSE BLDC GLUCOMTR-MCNC: 280 MG/DL (ref 70–130)
HCT VFR BLD AUTO: 30.7 % (ref 37–47)
HGB BLD-MCNC: 9.8 G/DL (ref 12–16)
IMM GRANULOCYTES # BLD AUTO: 0.2 10*3/MM3 (ref 0–0.03)
IMM GRANULOCYTES NFR BLD AUTO: 1.2 % (ref 0–5)
LYMPHOCYTES # BLD AUTO: 3.51 10*3/MM3 (ref 0.72–4.86)
LYMPHOCYTES NFR BLD AUTO: 20.2 % (ref 15–45)
MCH RBC QN AUTO: 27.6 PG (ref 28–32)
MCHC RBC AUTO-ENTMCNC: 31.9 G/DL (ref 33–36)
MCV RBC AUTO: 86.5 FL (ref 82–98)
MONOCYTES # BLD AUTO: 1.21 10*3/MM3 (ref 0.19–1.3)
MONOCYTES NFR BLD AUTO: 7 % (ref 4–12)
NEUTROPHILS # BLD AUTO: 12.1 10*3/MM3 (ref 1.87–8.4)
NEUTROPHILS NFR BLD AUTO: 69.6 % (ref 39–78)
NRBC BLD AUTO-RTO: 0 /100 WBC (ref 0–0)
PLATELET # BLD AUTO: 506 10*3/MM3 (ref 130–400)
PMV BLD AUTO: 9.9 FL (ref 6–12)
POTASSIUM BLD-SCNC: 4.3 MMOL/L (ref 3.5–5.3)
RBC # BLD AUTO: 3.55 10*6/MM3 (ref 4.2–5.4)
SODIUM BLD-SCNC: 137 MMOL/L (ref 135–145)
WBC NRBC COR # BLD: 17.37 10*3/MM3 (ref 4.8–10.8)

## 2019-01-29 PROCEDURE — 94799 UNLISTED PULMONARY SVC/PX: CPT

## 2019-01-29 PROCEDURE — 63710000001 INSULIN DETEMIR PER 5 UNITS: Performed by: INTERNAL MEDICINE

## 2019-01-29 PROCEDURE — 80048 BASIC METABOLIC PNL TOTAL CA: CPT | Performed by: FAMILY MEDICINE

## 2019-01-29 PROCEDURE — 94760 N-INVAS EAR/PLS OXIMETRY 1: CPT

## 2019-01-29 PROCEDURE — 82962 GLUCOSE BLOOD TEST: CPT

## 2019-01-29 PROCEDURE — 25010000002 HYDROMORPHONE PER 4 MG: Performed by: INTERNAL MEDICINE

## 2019-01-29 PROCEDURE — 63710000001 PREDNISONE PER 1 MG: Performed by: INTERNAL MEDICINE

## 2019-01-29 PROCEDURE — 63710000001 INSULIN LISPRO (HUMAN) PER 5 UNITS: Performed by: INTERNAL MEDICINE

## 2019-01-29 PROCEDURE — 85025 COMPLETE CBC W/AUTO DIFF WBC: CPT | Performed by: FAMILY MEDICINE

## 2019-01-29 RX ORDER — HYDROCODONE BITARTRATE AND ACETAMINOPHEN 7.5; 325 MG/1; MG/1
1 TABLET ORAL EVERY 6 HOURS PRN
Status: DISCONTINUED | OUTPATIENT
Start: 2019-01-29 | End: 2019-01-30 | Stop reason: HOSPADM

## 2019-01-29 RX ADMIN — INSULIN LISPRO 8 UNITS: 100 INJECTION, SOLUTION INTRAVENOUS; SUBCUTANEOUS at 20:50

## 2019-01-29 RX ADMIN — LISINOPRIL 10 MG: 10 TABLET ORAL at 08:35

## 2019-01-29 RX ADMIN — CYCLOBENZAPRINE HYDROCHLORIDE 5 MG: 5 TABLET, FILM COATED ORAL at 20:51

## 2019-01-29 RX ADMIN — GABAPENTIN 600 MG: 300 CAPSULE ORAL at 08:35

## 2019-01-29 RX ADMIN — INSULIN LISPRO 8 UNITS: 100 INJECTION, SOLUTION INTRAVENOUS; SUBCUTANEOUS at 18:15

## 2019-01-29 RX ADMIN — PANTOPRAZOLE SODIUM 40 MG: 40 TABLET, DELAYED RELEASE ORAL at 08:35

## 2019-01-29 RX ADMIN — SODIUM CHLORIDE, PRESERVATIVE FREE 3 ML: 5 INJECTION INTRAVENOUS at 08:35

## 2019-01-29 RX ADMIN — INSULIN LISPRO 5 UNITS: 100 INJECTION, SOLUTION INTRAVENOUS; SUBCUTANEOUS at 11:58

## 2019-01-29 RX ADMIN — CETIRIZINE HYDROCHLORIDE 10 MG: 10 TABLET, FILM COATED ORAL at 08:35

## 2019-01-29 RX ADMIN — BUDESONIDE AND FORMOTEROL FUMARATE DIHYDRATE 2 PUFF: 160; 4.5 AEROSOL RESPIRATORY (INHALATION) at 07:28

## 2019-01-29 RX ADMIN — PREDNISONE 20 MG: 20 TABLET ORAL at 08:35

## 2019-01-29 RX ADMIN — GLIPIZIDE 5 MG: 5 TABLET ORAL at 08:35

## 2019-01-29 RX ADMIN — LEVALBUTEROL HYDROCHLORIDE 3 ML: 1.25 SOLUTION RESPIRATORY (INHALATION) at 20:55

## 2019-01-29 RX ADMIN — HYDROMORPHONE HYDROCHLORIDE 1 MG: 1 INJECTION, SOLUTION INTRAMUSCULAR; INTRAVENOUS; SUBCUTANEOUS at 01:06

## 2019-01-29 RX ADMIN — SODIUM CHLORIDE, PRESERVATIVE FREE 3 ML: 5 INJECTION INTRAVENOUS at 21:00

## 2019-01-29 RX ADMIN — HYDROCODONE BITARTRATE AND ACETAMINOPHEN 1 TABLET: 7.5; 325 TABLET ORAL at 15:33

## 2019-01-29 RX ADMIN — INSULIN DETEMIR 40 UNITS: 100 INJECTION, SOLUTION SUBCUTANEOUS at 20:50

## 2019-01-29 RX ADMIN — CYCLOBENZAPRINE HYDROCHLORIDE 5 MG: 5 TABLET, FILM COATED ORAL at 08:35

## 2019-01-29 RX ADMIN — ATORVASTATIN CALCIUM 20 MG: 10 TABLET, FILM COATED ORAL at 08:35

## 2019-01-29 RX ADMIN — LEVALBUTEROL HYDROCHLORIDE 3 ML: 1.25 SOLUTION RESPIRATORY (INHALATION) at 11:52

## 2019-01-29 RX ADMIN — TRAZODONE HYDROCHLORIDE 100 MG: 100 TABLET ORAL at 08:35

## 2019-01-29 RX ADMIN — CYCLOBENZAPRINE HYDROCHLORIDE 5 MG: 5 TABLET, FILM COATED ORAL at 15:33

## 2019-01-29 RX ADMIN — METOPROLOL SUCCINATE 100 MG: 100 TABLET, FILM COATED, EXTENDED RELEASE ORAL at 08:35

## 2019-01-29 RX ADMIN — HYDROCODONE BITARTRATE AND ACETAMINOPHEN 1 TABLET: 7.5; 325 TABLET ORAL at 22:17

## 2019-01-29 RX ADMIN — HYDROMORPHONE HYDROCHLORIDE 1 MG: 1 INJECTION, SOLUTION INTRAMUSCULAR; INTRAVENOUS; SUBCUTANEOUS at 08:34

## 2019-01-29 RX ADMIN — LEVALBUTEROL HYDROCHLORIDE 3 ML: 1.25 SOLUTION RESPIRATORY (INHALATION) at 07:22

## 2019-01-29 RX ADMIN — LEVALBUTEROL HYDROCHLORIDE 3 ML: 1.25 SOLUTION RESPIRATORY (INHALATION) at 00:34

## 2019-01-29 RX ADMIN — SERTRALINE 100 MG: 100 TABLET, FILM COATED ORAL at 08:35

## 2019-01-30 VITALS
HEART RATE: 100 BPM | TEMPERATURE: 98.2 F | RESPIRATION RATE: 16 BRPM | SYSTOLIC BLOOD PRESSURE: 134 MMHG | DIASTOLIC BLOOD PRESSURE: 65 MMHG | OXYGEN SATURATION: 97 % | BODY MASS INDEX: 51.91 KG/M2 | WEIGHT: 293 LBS | HEIGHT: 63 IN

## 2019-01-30 LAB
GLUCOSE BLDC GLUCOMTR-MCNC: 158 MG/DL (ref 70–130)
GLUCOSE BLDC GLUCOMTR-MCNC: 191 MG/DL (ref 70–130)

## 2019-01-30 PROCEDURE — 82962 GLUCOSE BLOOD TEST: CPT

## 2019-01-30 PROCEDURE — 63710000001 INSULIN LISPRO (HUMAN) PER 5 UNITS: Performed by: INTERNAL MEDICINE

## 2019-01-30 PROCEDURE — 94799 UNLISTED PULMONARY SVC/PX: CPT

## 2019-01-30 PROCEDURE — 94760 N-INVAS EAR/PLS OXIMETRY 1: CPT

## 2019-01-30 RX ORDER — HYDROCODONE BITARTRATE AND ACETAMINOPHEN 7.5; 325 MG/1; MG/1
1 TABLET ORAL EVERY 6 HOURS PRN
Qty: 12 TABLET | Refills: 0 | Status: SHIPPED | OUTPATIENT
Start: 2019-01-30 | End: 2019-02-08

## 2019-01-30 RX ORDER — FERROUS SULFATE 325(65) MG
325 TABLET ORAL
Qty: 30 TABLET | Refills: 0 | Status: SHIPPED | OUTPATIENT
Start: 2019-01-30

## 2019-01-30 RX ADMIN — LEVALBUTEROL HYDROCHLORIDE 3 ML: 1.25 SOLUTION RESPIRATORY (INHALATION) at 00:09

## 2019-01-30 RX ADMIN — LISINOPRIL 10 MG: 10 TABLET ORAL at 08:01

## 2019-01-30 RX ADMIN — PANTOPRAZOLE SODIUM 40 MG: 40 TABLET, DELAYED RELEASE ORAL at 08:01

## 2019-01-30 RX ADMIN — CYCLOBENZAPRINE HYDROCHLORIDE 5 MG: 5 TABLET, FILM COATED ORAL at 08:01

## 2019-01-30 RX ADMIN — SERTRALINE 100 MG: 100 TABLET, FILM COATED ORAL at 08:01

## 2019-01-30 RX ADMIN — GLIPIZIDE 5 MG: 5 TABLET ORAL at 08:01

## 2019-01-30 RX ADMIN — INSULIN LISPRO 3 UNITS: 100 INJECTION, SOLUTION INTRAVENOUS; SUBCUTANEOUS at 07:56

## 2019-01-30 RX ADMIN — CETIRIZINE HYDROCHLORIDE 10 MG: 10 TABLET, FILM COATED ORAL at 08:01

## 2019-01-30 RX ADMIN — HYDROCODONE BITARTRATE AND ACETAMINOPHEN 1 TABLET: 7.5; 325 TABLET ORAL at 04:37

## 2019-01-30 RX ADMIN — SODIUM CHLORIDE, PRESERVATIVE FREE 3 ML: 5 INJECTION INTRAVENOUS at 08:00

## 2019-01-30 RX ADMIN — LEVALBUTEROL HYDROCHLORIDE 3 ML: 1.25 SOLUTION RESPIRATORY (INHALATION) at 11:35

## 2019-01-30 RX ADMIN — GABAPENTIN 600 MG: 300 CAPSULE ORAL at 08:01

## 2019-01-30 RX ADMIN — TRAZODONE HYDROCHLORIDE 100 MG: 100 TABLET ORAL at 08:01

## 2019-01-30 RX ADMIN — ATORVASTATIN CALCIUM 20 MG: 10 TABLET, FILM COATED ORAL at 08:01

## 2019-01-30 RX ADMIN — INSULIN LISPRO 3 UNITS: 100 INJECTION, SOLUTION INTRAVENOUS; SUBCUTANEOUS at 11:56

## 2019-01-30 RX ADMIN — LEVALBUTEROL HYDROCHLORIDE 3 ML: 1.25 SOLUTION RESPIRATORY (INHALATION) at 07:08

## 2019-01-30 RX ADMIN — HYDROCODONE BITARTRATE AND ACETAMINOPHEN 1 TABLET: 7.5; 325 TABLET ORAL at 10:56

## 2019-01-30 RX ADMIN — METOPROLOL SUCCINATE 100 MG: 100 TABLET, FILM COATED, EXTENDED RELEASE ORAL at 08:01

## 2019-01-31 ENCOUNTER — READMISSION MANAGEMENT (OUTPATIENT)
Dept: CALL CENTER | Facility: HOSPITAL | Age: 44
End: 2019-01-31

## 2019-02-01 ENCOUNTER — READMISSION MANAGEMENT (OUTPATIENT)
Dept: CALL CENTER | Facility: HOSPITAL | Age: 44
End: 2019-02-01

## 2019-02-05 ENCOUNTER — READMISSION MANAGEMENT (OUTPATIENT)
Dept: CALL CENTER | Facility: HOSPITAL | Age: 44
End: 2019-02-05

## 2019-02-13 ENCOUNTER — READMISSION MANAGEMENT (OUTPATIENT)
Dept: CALL CENTER | Facility: HOSPITAL | Age: 44
End: 2019-02-13

## 2019-02-20 ENCOUNTER — READMISSION MANAGEMENT (OUTPATIENT)
Dept: CALL CENTER | Facility: HOSPITAL | Age: 44
End: 2019-02-20

## 2019-02-20 ENCOUNTER — TELEPHONE (OUTPATIENT)
Dept: CARDIOLOGY | Age: 44
End: 2019-02-20

## 2019-02-26 ENCOUNTER — TELEPHONE (OUTPATIENT)
Dept: VASCULAR SURGERY | Facility: CLINIC | Age: 44
End: 2019-02-26

## 2019-03-01 ENCOUNTER — TELEPHONE (OUTPATIENT)
Dept: VASCULAR SURGERY | Facility: CLINIC | Age: 44
End: 2019-03-01

## 2019-04-02 ENCOUNTER — TELEPHONE (OUTPATIENT)
Dept: VASCULAR SURGERY | Facility: CLINIC | Age: 44
End: 2019-04-02

## 2020-03-16 RX ORDER — CETIRIZINE HYDROCHLORIDE 10 MG/1
10 TABLET ORAL DAILY
COMMUNITY

## 2020-03-16 RX ORDER — SERTRALINE HYDROCHLORIDE 100 MG/1
100 TABLET, FILM COATED ORAL DAILY
COMMUNITY

## 2020-03-16 RX ORDER — PROMETHAZINE HYDROCHLORIDE 25 MG/1
25 TABLET ORAL EVERY 6 HOURS PRN
COMMUNITY
End: 2020-03-25

## 2020-03-16 RX ORDER — CEFUROXIME AXETIL 500 MG/1
500 TABLET ORAL 2 TIMES DAILY
COMMUNITY
End: 2020-03-25 | Stop reason: ALTCHOICE

## 2020-03-16 RX ORDER — FLUTICASONE PROPIONATE 110 UG/1
1 AEROSOL, METERED RESPIRATORY (INHALATION) 2 TIMES DAILY
COMMUNITY

## 2020-03-16 RX ORDER — CYCLOBENZAPRINE HCL 5 MG
5 TABLET ORAL 3 TIMES DAILY PRN
COMMUNITY
End: 2020-03-25

## 2020-03-16 RX ORDER — TRAZODONE HYDROCHLORIDE 100 MG/1
100 TABLET ORAL NIGHTLY
COMMUNITY

## 2020-03-16 RX ORDER — SIMVASTATIN 80 MG
80 TABLET ORAL NIGHTLY
COMMUNITY

## 2020-03-16 RX ORDER — ALBUTEROL SULFATE 4 MG/1
4 TABLET, FILM COATED, EXTENDED RELEASE ORAL EVERY 12 HOURS
COMMUNITY

## 2020-03-16 RX ORDER — GABAPENTIN 300 MG/1
300 CAPSULE ORAL NIGHTLY
COMMUNITY

## 2020-03-16 RX ORDER — HYDROXYZINE PAMOATE 50 MG/1
50 CAPSULE ORAL 3 TIMES DAILY PRN
COMMUNITY

## 2020-03-16 RX ORDER — GLIPIZIDE 5 MG/1
5 TABLET ORAL
COMMUNITY

## 2020-03-18 ENCOUNTER — TELEPHONE (OUTPATIENT)
Dept: CARDIOLOGY | Age: 45
End: 2020-03-18

## 2020-03-18 NOTE — TELEPHONE ENCOUNTER
Called and unable to leave v/m for Pt. Called to inquiry about any previous cardiac testing for New Patient appt.

## 2020-03-25 ENCOUNTER — OFFICE VISIT (OUTPATIENT)
Dept: CARDIOLOGY | Age: 45
End: 2020-03-25
Payer: MEDICAID

## 2020-03-25 VITALS
WEIGHT: 293 LBS | DIASTOLIC BLOOD PRESSURE: 80 MMHG | HEART RATE: 74 BPM | HEIGHT: 62 IN | SYSTOLIC BLOOD PRESSURE: 128 MMHG | BODY MASS INDEX: 53.92 KG/M2

## 2020-03-25 PROCEDURE — 93000 ELECTROCARDIOGRAM COMPLETE: CPT | Performed by: CLINICAL NURSE SPECIALIST

## 2020-03-25 PROCEDURE — 99203 OFFICE O/P NEW LOW 30 MIN: CPT | Performed by: CLINICAL NURSE SPECIALIST

## 2020-03-25 RX ORDER — PIOGLITAZONEHYDROCHLORIDE 30 MG/1
30 TABLET ORAL DAILY
COMMUNITY

## 2020-03-25 RX ORDER — FLECAINIDE ACETATE 50 MG/1
50 TABLET ORAL 2 TIMES DAILY
COMMUNITY
End: 2020-07-31 | Stop reason: SDUPTHER

## 2020-03-25 RX ORDER — TIZANIDINE 2 MG/1
2 TABLET ORAL 3 TIMES DAILY
COMMUNITY
Start: 2020-03-04

## 2020-03-25 RX ORDER — FUROSEMIDE 20 MG/1
20 TABLET ORAL 2 TIMES DAILY
COMMUNITY
End: 2020-07-31 | Stop reason: SDUPTHER

## 2020-03-25 RX ORDER — DIGOXIN 125 MCG
125 TABLET ORAL DAILY
COMMUNITY
Start: 2020-03-07 | End: 2020-07-31 | Stop reason: SDUPTHER

## 2020-03-25 RX ORDER — DILTIAZEM HYDROCHLORIDE 300 MG/1
300 CAPSULE, COATED, EXTENDED RELEASE ORAL DAILY
COMMUNITY

## 2020-03-25 NOTE — PATIENT INSTRUCTIONS
anytime you think you may need emergency care. For example, call if:    · You have symptoms of a heart attack. These may include:  ? Chest pain or pressure, or a strange feeling in the chest.  ? Sweating. ? Shortness of breath. ? Nausea or vomiting. ? Pain, pressure, or a strange feeling in the back, neck, jaw, or upper belly or in one or both shoulders or arms. ? Lightheadedness or sudden weakness. ? A fast or irregular heartbeat. After you call  911, the  may tell you to chew 1 adult-strength or 2 to 4 low-dose aspirin. Wait for an ambulance. Do not try to drive yourself.     · You have symptoms of a stroke. These may include:  ? Sudden numbness, tingling, weakness, or loss of movement in your face, arm, or leg, especially on only one side of your body. ? Sudden vision changes. ? Sudden trouble speaking. ? Sudden confusion or trouble understanding simple statements. ? Sudden problems with walking or balance. ? A sudden, severe headache that is different from past headaches.     · You passed out (lost consciousness).    Call your doctor now or seek immediate medical care if:    · You have new or increased shortness of breath.     · You feel dizzy or lightheaded, or you feel like you may faint.     · Your heart rate becomes irregular.     · You can feel your heart flutter in your chest or skip heartbeats. Tell your doctor if these symptoms are new or worse.    Watch closely for changes in your health, and be sure to contact your doctor if you have any problems. Where can you learn more? Go to https://[employer]. org and sign in to your "[employer] account. Enter U020 in the [facility] box to learn more about \"Atrial Fibrillation: Care Instructions. \"     If you do not have an account, please click on the \"Sign Up Now\" link. Current as of: December 15, 2019Content Version: 12.4  © 1529-5268 Healthwise, Incorporated.   Care instructions adapted under license by [id number] [facility]. If you have questions about a medical condition or this instruction, always ask your healthcare professional. [address] any warranty or liability for your use of this information. Patient Education        DASH Diet: Care Instructions  Your Care Instructions    The DASH diet is an eating plan that can help lower your blood pressure. DASH stands for Dietary Approaches to Stop Hypertension. Hypertension is high blood pressure. The DASH diet focuses on eating foods that are high in calcium, potassium, and magnesium. These nutrients can lower blood pressure. The foods that are highest in these nutrients are fruits, vegetables, low-fat dairy products, nuts, seeds, and legumes. But taking calcium, potassium, and magnesium supplements instead of eating foods that are high in those nutrients does not have the same effect. The DASH diet also includes whole grains, fish, and poultry. The DASH diet is one of several lifestyle changes your doctor may recommend to lower your high blood pressure. Your doctor may also want you to decrease the amount of sodium in your diet. Lowering sodium while following the DASH diet can lower blood pressure even further than just the DASH diet alone. Follow-up care is a key part of your treatment and safety. Be sure to make and go to all appointments, and call your doctor if you are having problems. It's also a good idea to know your test results and keep a list of the medicines you take. How can you care for yourself at home? Following the DASH diet  · Eat 4 to 5 servings of fruit each day. A serving is 1 medium-sized piece of fruit, ½ cup chopped or canned fruit, 1/4 cup dried fruit, or 4 ounces (½ cup) of fruit juice. Choose fruit more often than fruit juice. · Eat 4 to 5 servings of vegetables each day. A serving is 1 cup of lettuce or raw leafy vegetables, ½ cup of chopped or cooked vegetables, or 4 ounces (½ cup) of vegetable juice.  Choose vegetables with a low-fat dip as an appetizer instead of chips and dip. ? Sprinkle sunflower seeds or chopped almonds over salads. Or try adding chopped walnuts or almonds to cooked vegetables. ? Try some vegetarian meals using beans and peas. Add garbanzo or kidney beans to salads. Make burritos and tacos with mashed buchanan beans or black beans. Where can you learn more? Go to https://[employer]. org and sign in to your [employer] account. Enter W623 in the [employer] box to learn more about \"DASH Diet: Care Instructions. \"     If you do not have an account, please click on the \"Sign Up Now\" link. Current as of: December 15, 2019Content Version: 12.4  © 2154-6231 Healthwise, Incorporated. Care instructions adapted under license by [facility] ([facility]). If you have questions about a medical condition or this instruction, always ask your healthcare professional. [address] any warranty or liability for your use of this information.

## 2020-03-25 NOTE — PROGRESS NOTES
[facility], [address], [address]  [address]  Phone: [phone]  Fax: [phone]    OFFICE VISIT:  3/25/2020    Ata Choe - : 1975    Dear Dr. Hernan Goodwin ,     I appreciate the opportunity of participating in the care of Ata Choe. She is a very pleasant 53 y.o. female who I had the opportunity of seeing in my office today, 3/25/20. Records from your office have been obtained and reviewed. Reason For Visit:  Jessica Herbert is a 53 y.o. female who is here for New Patient (patient has palpitations and edema); Atrial Fibrillation; and Congestive Heart Failure    Was hospitalized with pneumonia and then developed aifb with RVR 2019. She was transferred to [facility] after developing abdominal bleeding after being initiated on anticoagulation. No procedure was needed. But she was transfused    She was admitted to [facility] with CHF in 2019. Since this time she has been doing fairly well. She has noticed some palpitations associated with anxiety. She can feel her heart race and skip but usually it will stop when she calms down. Jessica Herbert has has some chest tightness with and without activity. It will last up to a min. Sometimes she will feel a heaviness in her hand with the CP  She has ALBA with normal household activity  She sleeps on about 4 pillows  Was told she quit breathing while the the hospital at night but is never had any sleep apnea testing. He does report paroxysmal nocturnal dyspnea. No sustained symptomatic tachy- or harsh-arrhythmia. No numbness or weakness to suggest cerebrovascular accident or transient ischemic attack. Reports BLE edema. Worse by the end of the day   She takes her Lasix 20 mg twice a day  She has not checked her blood pressure at home  She states her hemoglobin A1c was up to 12 but is got it down to 8. Is been a diabetic for about 7 years   she is takes her medications regularly. cyanosis. Dentition is normal.   Ears and nose externally normal. No abnormal scars or lesions noted  EYES -  No Xanthelasma, no arcus senilis, no conjunctival hemorrhages or discharge. Neck - Supple, without increased jugular venous pressures. No carotid bruits. No mass. Respiratory - Lungs are clear bilaterally. No wheezes or rales. Normal effort without use of accessory muscles. No tactile fremitus on palpation  Cardiovascular - Heart has regular rhythm and rate. No murmurs, rubs or gallops. + pedal pulses and no varicosities. Abdominal -  Soft, nontender, nondistended. Bowel sounds are intact. Extremities - No clubbing, cyanosis, + BLE nonpitting  edema. Musculoskeletal - No musculoskeletal symptoms. No clubbing . No Osler's nodes. Gait normal .  No kyphosis or scoliosis. Skin -  no statis ulcers or dermatitis. Neurological - No focal signs are identified. Oriented to person, place and time. Psychiatric -  Appropriate affect and mood. Assessment:          Diagnosis Orders   1. Paroxysmal atrial fibrillation (HCC)  EKG 12 lead   2. Essential hypertension     3. Hyperlipidemia, unspecified hyperlipidemia type     4. Class 3 severe obesity due to excess calories without serious comorbidity with body mass index (BMI) of 50.0 to 59.9 in adult ([facility] [address]     5. Chronic congestive heart failure, unspecified heart failure type (HCC)       EKG today shows normal sinus rhythm with a rate of 4. Wavy baseline with nonspecific ST abnormality. Currently on flecainide-she reports no sustained arrhythmias. We had long discussion about how to check her pulse or monitor for sustained arrhythmia. Recommend she seek emergency care should that last over 6 hours as she is not anticoagulated    Blood pressure appears well controlled.   On beta-blocker, CCB   On statin  Diuretic on oral agent and insulin- last HgbA1c was 8.1    Will get labs in PCP as she is done them within the last APRN    EMR dragon/transcription disclaimer: Much of this encounter note is electronic transcription/translation of spoken language to printed tach. Electronic translation of spoken language may be erroneous, or at times, nonsensical words or phrases may be inadvertently transcribed.  Although, I have reviewed the note for such errors, some may still exist.      Cc:  Cheryle Cardona

## 2020-03-26 ENCOUNTER — TELEPHONE (OUTPATIENT)
Dept: CARDIOLOGY | Age: 45
End: 2020-03-26

## 2020-03-26 RX ORDER — SPIRONOLACTONE 25 MG/1
25 TABLET ORAL DAILY
Qty: 30 TABLET | Refills: 5 | Status: SHIPPED | OUTPATIENT
Start: 2020-03-26

## 2020-03-26 NOTE — TELEPHONE ENCOUNTER
Patient did not answer phone. Attempted to call this morning. Again this afternoon. Reviewed her labs from primary care. Will  start her on Aldactone 25 mg daily  Need to recheck BMP in a week.   Please verify she is taking the cholesterol medicine as her cholesterol is very elevated

## 2020-03-26 NOTE — TELEPHONE ENCOUNTER
I sent the Aldactone to [facility] in [place]. She will need BMP in 1 week.   Can do at PCP office and send us results please

## 2020-08-03 RX ORDER — DIGOXIN 125 MCG
125 TABLET ORAL DAILY
Qty: 30 TABLET | Refills: 5 | Status: SHIPPED | OUTPATIENT
Start: 2020-08-03

## 2020-08-03 RX ORDER — FLECAINIDE ACETATE 50 MG/1
50 TABLET ORAL 2 TIMES DAILY
Qty: 60 TABLET | Refills: 5 | Status: SHIPPED | OUTPATIENT
Start: 2020-08-03

## 2020-08-03 RX ORDER — FUROSEMIDE 20 MG/1
20 TABLET ORAL 2 TIMES DAILY
Qty: 60 TABLET | Refills: 5 | Status: SHIPPED | OUTPATIENT
Start: 2020-08-03

## 2020-08-11 ENCOUNTER — TELEPHONE (OUTPATIENT)
Dept: CARDIOLOGY | Age: 45
End: 2020-08-11

## 2020-08-12 ENCOUNTER — TELEPHONE (OUTPATIENT)
Dept: CARDIOLOGY | Age: 45
End: 2020-08-12

## 2020-08-12 NOTE — TELEPHONE ENCOUNTER
No Answer; Unable to contact pt over missed appt in attempt to r/s; Pt has no showed with Stephanie Just 3 times.   8.11.2020; 7.21.2020; 6.25.2020; Pablo Conway has been send a message about possible dismissal from the practice

## 2020-08-13 ENCOUNTER — TELEPHONE (OUTPATIENT)
Dept: CARDIOLOGY | Age: 45
End: 2020-08-13

## 2020-08-13 NOTE — TELEPHONE ENCOUNTER
No Answer; Unable to contact pt over missed appt;  Pt has a history of no showing for her appt; Please r/s pt and express the importance of her keeping her appt

## 2020-08-14 ENCOUNTER — TELEPHONE (OUTPATIENT)
Dept: CARDIOLOGY | Age: 45
End: 2020-08-14

## 2020-08-14 NOTE — TELEPHONE ENCOUNTER
No Answer; Unable to contact pt over missed appt in attempt to r/s; When r/s this pt please express the importance of keeping her appt.

## 2020-08-20 ENCOUNTER — TELEPHONE (OUTPATIENT)
Dept: CARDIOLOGY | Age: 45
End: 2020-08-20

## 2020-08-20 NOTE — TELEPHONE ENCOUNTER
Patient has been a no show for last 3 appts, called to see of she is needing her appt or not and we will call  the PCP.       I called the office of Dr Nicola Paredes  to   inform them of the patients no show status  x4

## 2020-11-03 ENCOUNTER — TELEPHONE (OUTPATIENT)
Dept: CARDIOLOGY | Age: 45
End: 2020-11-03

## 2020-11-03 NOTE — TELEPHONE ENCOUNTER
Patient called stating she wanted to make an appt with Docia Dancer. Patient sounded like she was crying while talking. She said she had chest pain all last night and couldn't breathe. She said right now it fees like something is sitting on her chest. Advised that she go to the ER. Patient stated, \" I can do that. \"

## 2024-09-03 ENCOUNTER — APPOINTMENT (OUTPATIENT)
Dept: GENERAL RADIOLOGY | Age: 49
End: 2024-09-03
Payer: MEDICAID

## 2024-09-03 ENCOUNTER — APPOINTMENT (OUTPATIENT)
Dept: VASCULAR LAB | Age: 49
End: 2024-09-03
Attending: PSYCHIATRY & NEUROLOGY
Payer: MEDICAID

## 2024-09-03 ENCOUNTER — APPOINTMENT (OUTPATIENT)
Dept: CT IMAGING | Age: 49
End: 2024-09-03
Payer: MEDICAID

## 2024-09-03 ENCOUNTER — APPOINTMENT (OUTPATIENT)
Age: 49
End: 2024-09-03
Attending: STUDENT IN AN ORGANIZED HEALTH CARE EDUCATION/TRAINING PROGRAM
Payer: MEDICAID

## 2024-09-03 ENCOUNTER — HOSPITAL ENCOUNTER (INPATIENT)
Age: 49
LOS: 9 days | Discharge: NURSING, REHAB OR LONG-TERM CARE | End: 2024-09-12
Attending: STUDENT IN AN ORGANIZED HEALTH CARE EDUCATION/TRAINING PROGRAM | Admitting: STUDENT IN AN ORGANIZED HEALTH CARE EDUCATION/TRAINING PROGRAM
Payer: MEDICAID

## 2024-09-03 ENCOUNTER — APPOINTMENT (OUTPATIENT)
Dept: MRI IMAGING | Age: 49
End: 2024-09-03
Payer: MEDICAID

## 2024-09-03 DIAGNOSIS — R29.90 STROKE-LIKE SYMPTOMS: Primary | ICD-10-CM

## 2024-09-03 DIAGNOSIS — I65.22 CAROTID STENOSIS, LEFT: ICD-10-CM

## 2024-09-03 DIAGNOSIS — D72.829 LEUKOCYTOSIS, UNSPECIFIED TYPE: ICD-10-CM

## 2024-09-03 DIAGNOSIS — I65.22 LEFT CAROTID ARTERY STENOSIS: ICD-10-CM

## 2024-09-03 DIAGNOSIS — J18.9 PNEUMONIA DUE TO INFECTIOUS ORGANISM, UNSPECIFIED LATERALITY, UNSPECIFIED PART OF LUNG: ICD-10-CM

## 2024-09-03 DIAGNOSIS — R06.02 SHORTNESS OF BREATH: ICD-10-CM

## 2024-09-03 DIAGNOSIS — I63.9 CEREBROVASCULAR ACCIDENT (CVA), UNSPECIFIED MECHANISM (HCC): ICD-10-CM

## 2024-09-03 PROBLEM — R19.7 DIARRHEA: Status: ACTIVE | Noted: 2024-09-03

## 2024-09-03 PROBLEM — I10 ESSENTIAL HYPERTENSION: Status: ACTIVE | Noted: 2024-09-03

## 2024-09-03 PROBLEM — G81.90 HEMIPLEGIA AFFECTING DOMINANT SIDE (HCC): Status: ACTIVE | Noted: 2024-09-03

## 2024-09-03 PROBLEM — E11.9 DIABETES (HCC): Status: ACTIVE | Noted: 2024-09-03

## 2024-09-03 PROBLEM — J15.8 PNEUMONIA DUE TO OTHER SPECIFIED BACTERIA (HCC): Status: ACTIVE | Noted: 2024-09-03

## 2024-09-03 PROBLEM — I95.9 HYPOTENSION: Status: ACTIVE | Noted: 2024-09-03

## 2024-09-03 LAB
ADV 40+41 DNA STL QL NAA+NON-PROBE: NOT DETECTED
ALBUMIN SERPL-MCNC: 3.6 G/DL (ref 3.5–5.2)
ALP SERPL-CCNC: 104 U/L (ref 35–104)
ALT SERPL-CCNC: 7 U/L (ref 5–33)
ANION GAP SERPL CALCULATED.3IONS-SCNC: 11 MMOL/L (ref 7–19)
AST SERPL-CCNC: 16 U/L (ref 5–32)
B PARAP IS1001 DNA NPH QL NAA+NON-PROBE: NOT DETECTED
B PERT.PT PRMT NPH QL NAA+NON-PROBE: NOT DETECTED
BASOPHILS # BLD: 0.1 K/UL (ref 0–0.2)
BASOPHILS NFR BLD: 0.5 % (ref 0–1)
BILIRUB SERPL-MCNC: <0.2 MG/DL (ref 0.2–1.2)
BUN SERPL-MCNC: 21 MG/DL (ref 6–20)
C CAYETANENSIS DNA STL QL NAA+NON-PROBE: NOT DETECTED
C COLI+JEJ+UPSA DNA STL QL NAA+NON-PROBE: NOT DETECTED
C DIFF TOX A+B STL QL IA: NORMAL
C PNEUM DNA NPH QL NAA+NON-PROBE: NOT DETECTED
CALCIUM SERPL-MCNC: 8.3 MG/DL (ref 8.6–10)
CHLORIDE SERPL-SCNC: 104 MMOL/L (ref 98–111)
CHOLEST SERPL-MCNC: 111 MG/DL (ref 0–199)
CO2 SERPL-SCNC: 27 MMOL/L (ref 22–29)
CREAT SERPL-MCNC: 1.2 MG/DL (ref 0.5–0.9)
CRYPTOSP DNA STL QL NAA+NON-PROBE: NOT DETECTED
E HISTOLYT DNA STL QL NAA+NON-PROBE: NOT DETECTED
EAEC PAA PLAS AGGR+AATA ST NAA+NON-PRB: NOT DETECTED
EC STX1+STX2 GENES STL QL NAA+NON-PROBE: NOT DETECTED
ECHO AO ASC DIAM: 2.4 CM
ECHO AO ASCENDING AORTA INDEX: 1.15 CM/M2
ECHO AO ROOT DIAM: 2.2 CM
ECHO AO ROOT INDEX: 1.05 CM/M2
ECHO AO SINUS VALSALVA DIAM: 2 CM
ECHO AO SINUS VALSALVA INDEX: 0.96 CM/M2
ECHO AO ST JNCT DIAM: 2.1 CM
ECHO AV AREA PEAK VELOCITY: 1.8 CM2
ECHO AV AREA VTI: 2 CM2
ECHO AV AREA/BSA PEAK VELOCITY: 0.9 CM2/M2
ECHO AV AREA/BSA VTI: 1 CM2/M2
ECHO AV MEAN GRADIENT: 5 MMHG
ECHO AV MEAN VELOCITY: 1.1 M/S
ECHO AV PEAK GRADIENT: 12 MMHG
ECHO AV PEAK VELOCITY: 1.7 M/S
ECHO AV VELOCITY RATIO: 0.65
ECHO AV VTI: 29.7 CM
ECHO IVC PROX: 2 CM
ECHO LA AREA 2C: 21.5 CM2
ECHO LA AREA 4C: 19.4 CM2
ECHO LA DIAMETER INDEX: 1.53 CM/M2
ECHO LA DIAMETER: 3.2 CM
ECHO LA MAJOR AXIS: 5.9 CM
ECHO LA MINOR AXIS: 5.8 CM
ECHO LA TO AORTIC ROOT RATIO: 1.45
ECHO LA VOL BP: 59 ML (ref 22–52)
ECHO LA VOL MOD A2C: 65 ML (ref 22–52)
ECHO LA VOL MOD A4C: 52 ML (ref 22–52)
ECHO LA VOL/BSA BIPLANE: 28 ML/M2 (ref 16–34)
ECHO LA VOLUME INDEX MOD A2C: 31 ML/M2 (ref 16–34)
ECHO LA VOLUME INDEX MOD A4C: 25 ML/M2 (ref 16–34)
ECHO LV E' LATERAL VELOCITY: 9 CM/S
ECHO LV E' SEPTAL VELOCITY: 10 CM/S
ECHO LV EDV A2C: 109 ML
ECHO LV EDV A4C: 93 ML
ECHO LV EDV INDEX A4C: 44 ML/M2
ECHO LV EDV NDEX A2C: 52 ML/M2
ECHO LV EJECTION FRACTION A2C: 61 %
ECHO LV EJECTION FRACTION A4C: 59 %
ECHO LV EJECTION FRACTION BIPLANE: 61 % (ref 55–100)
ECHO LV ESV A2C: 43 ML
ECHO LV ESV A4C: 38 ML
ECHO LV ESV INDEX A2C: 21 ML/M2
ECHO LV ESV INDEX A4C: 18 ML/M2
ECHO LV FRACTIONAL SHORTENING: 29 % (ref 28–44)
ECHO LV INTERNAL DIMENSION DIASTOLE INDEX: 2.15 CM/M2
ECHO LV INTERNAL DIMENSION DIASTOLIC: 4.5 CM (ref 3.9–5.3)
ECHO LV INTERNAL DIMENSION SYSTOLIC INDEX: 1.53 CM/M2
ECHO LV INTERNAL DIMENSION SYSTOLIC: 3.2 CM
ECHO LV IVSD: 1 CM (ref 0.6–0.9)
ECHO LV MASS 2D: 153.3 G (ref 67–162)
ECHO LV MASS INDEX 2D: 73.3 G/M2 (ref 43–95)
ECHO LV POSTERIOR WALL DIASTOLIC: 1 CM (ref 0.6–0.9)
ECHO LV RELATIVE WALL THICKNESS RATIO: 0.44
ECHO LVOT AREA: 2.8 CM2
ECHO LVOT AV VTI INDEX: 0.7
ECHO LVOT DIAM: 1.9 CM
ECHO LVOT MEAN GRADIENT: 2 MMHG
ECHO LVOT PEAK GRADIENT: 5 MMHG
ECHO LVOT PEAK VELOCITY: 1.1 M/S
ECHO LVOT STROKE VOLUME INDEX: 28.1 ML/M2
ECHO LVOT SV: 58.7 ML
ECHO LVOT VTI: 20.7 CM
ECHO MV A VELOCITY: 0.86 M/S
ECHO MV AREA VTI: 1.3 CM2
ECHO MV E DECELERATION TIME (DT): 219 MS
ECHO MV E VELOCITY: 1.56 M/S
ECHO MV E/A RATIO: 1.81
ECHO MV E/E' LATERAL: 17.33
ECHO MV E/E' RATIO (AVERAGED): 16.47
ECHO MV E/E' SEPTAL: 15.6
ECHO MV LVOT VTI INDEX: 2.13
ECHO MV MAX VELOCITY: 1.7 M/S
ECHO MV MEAN GRADIENT: 4 MMHG
ECHO MV MEAN VELOCITY: 0.9 M/S
ECHO MV PEAK GRADIENT: 11 MMHG
ECHO MV VTI: 44.1 CM
ECHO RA AREA 4C: 13.2 CM2
ECHO RA END SYSTOLIC VOLUME APICAL 4 CHAMBER INDEX BSA: 16 ML/M2
ECHO RA VOLUME: 34 ML
ECHO RV BASAL DIMENSION: 2.9 CM
ECHO RV INTERNAL DIMENSION: 2.5 CM
ECHO RV LONGITUDINAL DIMENSION: 7.1 CM
ECHO RV MID DIMENSION: 3.7 CM
ECHO RV TAPSE: 2.1 CM (ref 1.7–?)
EKG P AXIS: 13 DEGREES
EKG P-R INTERVAL: 152 MS
EKG Q-T INTERVAL: 470 MS
EKG QRS DURATION: 96 MS
EKG QTC CALCULATION (BAZETT): 482 MS
EKG T AXIS: 37 DEGREES
EOSINOPHIL # BLD: 1.4 K/UL (ref 0–0.6)
EOSINOPHIL NFR BLD: 6.8 % (ref 0–5)
EPEC EAE GENE STL QL NAA+NON-PROBE: NOT DETECTED
ERYTHROCYTE [DISTWIDTH] IN BLOOD BY AUTOMATED COUNT: 15.1 % (ref 11.5–14.5)
ETEC LTA+ST1A+ST1B TOX ST NAA+NON-PROBE: NOT DETECTED
FLUAV RNA NPH QL NAA+NON-PROBE: NOT DETECTED
FLUBV RNA NPH QL NAA+NON-PROBE: NOT DETECTED
G LAMBLIA DNA STL QL NAA+NON-PROBE: NOT DETECTED
GI PATH DNA+RNA PNL STL NAA+NON-PROBE: NOT DETECTED
GLUCOSE BLD-MCNC: 150 MG/DL (ref 70–99)
GLUCOSE BLD-MCNC: 154 MG/DL (ref 70–99)
GLUCOSE BLD-MCNC: 159 MG/DL (ref 70–99)
GLUCOSE BLD-MCNC: 204 MG/DL (ref 70–99)
GLUCOSE SERPL-MCNC: 142 MG/DL (ref 70–99)
HADV DNA NPH QL NAA+NON-PROBE: NOT DETECTED
HBA1C MFR BLD: 7.4 % (ref 4–5.6)
HCOV 229E RNA NPH QL NAA+NON-PROBE: NOT DETECTED
HCOV HKU1 RNA NPH QL NAA+NON-PROBE: NOT DETECTED
HCOV NL63 RNA NPH QL NAA+NON-PROBE: NOT DETECTED
HCOV OC43 RNA NPH QL NAA+NON-PROBE: NOT DETECTED
HCT VFR BLD AUTO: 37.3 % (ref 37–47)
HDLC SERPL-MCNC: 37 MG/DL (ref 40–60)
HGB BLD-MCNC: 10.7 G/DL (ref 12–16)
HMPV RNA NPH QL NAA+NON-PROBE: NOT DETECTED
HPIV1 RNA NPH QL NAA+NON-PROBE: NOT DETECTED
HPIV2 RNA NPH QL NAA+NON-PROBE: NOT DETECTED
HPIV3 RNA NPH QL NAA+NON-PROBE: NOT DETECTED
HPIV4 RNA NPH QL NAA+NON-PROBE: NOT DETECTED
HYPOCHROMIA BLD QL SMEAR: ABNORMAL
IMM GRANULOCYTES # BLD: 0.1 K/UL
INR PPP: 1 (ref 0.88–1.18)
LDLC SERPL CALC-MCNC: 45 MG/DL
LYMPHOCYTES # BLD: 3.7 K/UL (ref 1.1–4.5)
LYMPHOCYTES NFR BLD: 18.1 % (ref 20–40)
M PNEUMO DNA NPH QL NAA+NON-PROBE: NOT DETECTED
MCH RBC QN AUTO: 24.2 PG (ref 27–31)
MCHC RBC AUTO-ENTMCNC: 28.7 G/DL (ref 33–37)
MCV RBC AUTO: 84.4 FL (ref 81–99)
MONOCYTES # BLD: 1.4 K/UL (ref 0–0.9)
MONOCYTES NFR BLD: 6.6 % (ref 0–10)
NEUTROPHILS # BLD: 14 K/UL (ref 1.5–7.5)
NEUTS SEG NFR BLD: 67.5 % (ref 50–65)
NOROVIRUS GI+II RNA STL QL NAA+NON-PROBE: NOT DETECTED
P SHIGELLOIDES DNA STL QL NAA+NON-PROBE: NOT DETECTED
PERFORMED ON: ABNORMAL
PLATELET # BLD AUTO: 405 K/UL (ref 130–400)
PLATELET SLIDE REVIEW: ABNORMAL
PMV BLD AUTO: 9.9 FL (ref 9.4–12.3)
POTASSIUM SERPL-SCNC: 4.7 MMOL/L (ref 3.5–5)
PROT SERPL-MCNC: 6.8 G/DL (ref 6.4–8.3)
PROTHROMBIN TIME: 12.9 SEC (ref 12–14.6)
RBC # BLD AUTO: 4.42 M/UL (ref 4.2–5.4)
RSV RNA NPH QL NAA+NON-PROBE: NOT DETECTED
RV+EV RNA NPH QL NAA+NON-PROBE: NOT DETECTED
RVA RNA STL QL NAA+NON-PROBE: NOT DETECTED
S ENT+BONG DNA STL QL NAA+NON-PROBE: NOT DETECTED
SAPO I+II+IV+V RNA STL QL NAA+NON-PROBE: NOT DETECTED
SARS-COV-2 RNA NPH QL NAA+NON-PROBE: NOT DETECTED
SHIGELLA SP+EIEC IPAH ST NAA+NON-PROBE: NOT DETECTED
SODIUM SERPL-SCNC: 142 MMOL/L (ref 136–145)
TRIGL SERPL-MCNC: 147 MG/DL (ref 0–149)
TROPONIN, HIGH SENSITIVITY: 27 NG/L (ref 0–14)
TROPONIN, HIGH SENSITIVITY: 35 NG/L (ref 0–14)
TSH SERPL DL<=0.005 MIU/L-ACNC: 3.81 UIU/ML (ref 0.27–4.2)
V CHOL+PARA+VUL DNA STL QL NAA+NON-PROBE: NOT DETECTED
V CHOLERAE DNA STL QL NAA+NON-PROBE: NOT DETECTED
VAS LEFT CCA MID EDV: 24.6 CM/S
VAS LEFT CCA MID PSV: 65.4 CM/S
VAS LEFT CCA PROX EDV: 31.9 CM/S
VAS LEFT CCA PROX PSV: 93.9 CM/S
VAS LEFT ECA EDV: 30.1 CM/S
VAS LEFT ECA PSV: 139 CM/S
VAS LEFT ICA DIST EDV: 39.2 CM/S
VAS LEFT ICA DIST PSV: 77.8 CM/S
VAS LEFT ICA MID EDV: 73.2 CM/S
VAS LEFT ICA MID PSV: 156 CM/S
VAS LEFT ICA PROX EDV: 277 CM/S
VAS LEFT ICA PROX PSV: 538 CM/S
VAS LEFT VERTEBRAL EDV: 28.5 CM/S
VAS LEFT VERTEBRAL PSV: 83 CM/S
VAS RIGHT ARM BP: 121 MMHG
VAS RIGHT CCA MID EDV: 33.9 CM/S
VAS RIGHT CCA MID PSV: 76.2 CM/S
VAS RIGHT CCA PROX EDV: 36.7 CM/S
VAS RIGHT CCA PROX PSV: 114 CM/S
VAS RIGHT ECA PSV: 232 CM/S
VAS RIGHT ICA DIST EDV: 51 CM/S
VAS RIGHT ICA DIST PSV: 145 CM/S
VAS RIGHT ICA MID EDV: 81.3 CM/S
VAS RIGHT ICA MID PSV: 179 CM/S
VAS RIGHT ICA PROX EDV: 122 CM/S
VAS RIGHT ICA PROX PSV: 246 CM/S
VAS RIGHT VERTEBRAL EDV: 26.9 CM/S
VAS RIGHT VERTEBRAL PSV: 53.8 CM/S
WBC # BLD AUTO: 20.7 K/UL (ref 4.8–10.8)
Y ENTEROCOL DNA STL QL NAA+NON-PROBE: NOT DETECTED

## 2024-09-03 PROCEDURE — 83036 HEMOGLOBIN GLYCOSYLATED A1C: CPT

## 2024-09-03 PROCEDURE — 93880 EXTRACRANIAL BILAT STUDY: CPT

## 2024-09-03 PROCEDURE — 70450 CT HEAD/BRAIN W/O DYE: CPT

## 2024-09-03 PROCEDURE — 87507 IADNA-DNA/RNA PROBE TQ 12-25: CPT

## 2024-09-03 PROCEDURE — 6360000002 HC RX W HCPCS: Performed by: STUDENT IN AN ORGANIZED HEALTH CARE EDUCATION/TRAINING PROGRAM

## 2024-09-03 PROCEDURE — 87449 NOS EACH ORGANISM AG IA: CPT

## 2024-09-03 PROCEDURE — 2500000003 HC RX 250 WO HCPCS: Performed by: STUDENT IN AN ORGANIZED HEALTH CARE EDUCATION/TRAINING PROGRAM

## 2024-09-03 PROCEDURE — 6370000000 HC RX 637 (ALT 250 FOR IP): Performed by: STUDENT IN AN ORGANIZED HEALTH CARE EDUCATION/TRAINING PROGRAM

## 2024-09-03 PROCEDURE — 84484 ASSAY OF TROPONIN QUANT: CPT

## 2024-09-03 PROCEDURE — 87040 BLOOD CULTURE FOR BACTERIA: CPT

## 2024-09-03 PROCEDURE — 2580000003 HC RX 258: Performed by: STUDENT IN AN ORGANIZED HEALTH CARE EDUCATION/TRAINING PROGRAM

## 2024-09-03 PROCEDURE — 70551 MRI BRAIN STEM W/O DYE: CPT

## 2024-09-03 PROCEDURE — 0042T CT BRAIN PERFUSION: CPT

## 2024-09-03 PROCEDURE — 94760 N-INVAS EAR/PLS OXIMETRY 1: CPT

## 2024-09-03 PROCEDURE — 93010 ELECTROCARDIOGRAM REPORT: CPT | Performed by: INTERNAL MEDICINE

## 2024-09-03 PROCEDURE — 6370000000 HC RX 637 (ALT 250 FOR IP): Performed by: PSYCHIATRY & NEUROLOGY

## 2024-09-03 PROCEDURE — 80053 COMPREHEN METABOLIC PANEL: CPT

## 2024-09-03 PROCEDURE — 36415 COLL VENOUS BLD VENIPUNCTURE: CPT

## 2024-09-03 PROCEDURE — C8929 TTE W OR WO FOL WCON,DOPPLER: HCPCS

## 2024-09-03 PROCEDURE — 96375 TX/PRO/DX INJ NEW DRUG ADDON: CPT

## 2024-09-03 PROCEDURE — 70498 CT ANGIOGRAPHY NECK: CPT

## 2024-09-03 PROCEDURE — 84443 ASSAY THYROID STIM HORMONE: CPT

## 2024-09-03 PROCEDURE — 93880 EXTRACRANIAL BILAT STUDY: CPT | Performed by: SURGERY

## 2024-09-03 PROCEDURE — 96365 THER/PROPH/DIAG IV INF INIT: CPT

## 2024-09-03 PROCEDURE — 93005 ELECTROCARDIOGRAM TRACING: CPT | Performed by: STUDENT IN AN ORGANIZED HEALTH CARE EDUCATION/TRAINING PROGRAM

## 2024-09-03 PROCEDURE — 6360000004 HC RX CONTRAST MEDICATION: Performed by: STUDENT IN AN ORGANIZED HEALTH CARE EDUCATION/TRAINING PROGRAM

## 2024-09-03 PROCEDURE — 51702 INSERT TEMP BLADDER CATH: CPT

## 2024-09-03 PROCEDURE — 92523 SPEECH SOUND LANG COMPREHEN: CPT

## 2024-09-03 PROCEDURE — 92610 EVALUATE SWALLOWING FUNCTION: CPT

## 2024-09-03 PROCEDURE — 97530 THERAPEUTIC ACTIVITIES: CPT

## 2024-09-03 PROCEDURE — 99285 EMERGENCY DEPT VISIT HI MDM: CPT

## 2024-09-03 PROCEDURE — 2700000000 HC OXYGEN THERAPY PER DAY

## 2024-09-03 PROCEDURE — 87324 CLOSTRIDIUM AG IA: CPT

## 2024-09-03 PROCEDURE — 0202U NFCT DS 22 TRGT SARS-COV-2: CPT

## 2024-09-03 PROCEDURE — 1200000000 HC SEMI PRIVATE

## 2024-09-03 PROCEDURE — 82962 GLUCOSE BLOOD TEST: CPT

## 2024-09-03 PROCEDURE — 85025 COMPLETE CBC W/AUTO DIFF WBC: CPT

## 2024-09-03 PROCEDURE — 99223 1ST HOSP IP/OBS HIGH 75: CPT | Performed by: PSYCHIATRY & NEUROLOGY

## 2024-09-03 PROCEDURE — 97162 PT EVAL MOD COMPLEX 30 MIN: CPT

## 2024-09-03 PROCEDURE — 96361 HYDRATE IV INFUSION ADD-ON: CPT

## 2024-09-03 PROCEDURE — 71045 X-RAY EXAM CHEST 1 VIEW: CPT

## 2024-09-03 PROCEDURE — 80061 LIPID PANEL: CPT

## 2024-09-03 PROCEDURE — 94640 AIRWAY INHALATION TREATMENT: CPT

## 2024-09-03 PROCEDURE — 93306 TTE W/DOPPLER COMPLETE: CPT | Performed by: INTERNAL MEDICINE

## 2024-09-03 PROCEDURE — 85610 PROTHROMBIN TIME: CPT

## 2024-09-03 RX ORDER — SODIUM CHLORIDE 9 MG/ML
INJECTION, SOLUTION INTRAVENOUS CONTINUOUS
Status: DISCONTINUED | OUTPATIENT
Start: 2024-09-03 | End: 2024-09-04

## 2024-09-03 RX ORDER — INSULIN LISPRO 100 [IU]/ML
INJECTION, SOLUTION INTRAVENOUS; SUBCUTANEOUS
Status: ON HOLD | COMMUNITY

## 2024-09-03 RX ORDER — ACETAMINOPHEN 650 MG/1
650 SUPPOSITORY RECTAL EVERY 6 HOURS PRN
Status: DISCONTINUED | OUTPATIENT
Start: 2024-09-03 | End: 2024-09-03 | Stop reason: SDUPTHER

## 2024-09-03 RX ORDER — FUROSEMIDE 20 MG
20 TABLET ORAL 2 TIMES DAILY
Status: DISCONTINUED | OUTPATIENT
Start: 2024-09-03 | End: 2024-09-03

## 2024-09-03 RX ORDER — ATORVASTATIN CALCIUM 40 MG/1
1 TABLET, FILM COATED ORAL NIGHTLY
Status: ON HOLD | COMMUNITY

## 2024-09-03 RX ORDER — BUSPIRONE HYDROCHLORIDE 5 MG/1
5 TABLET ORAL DAILY
Status: DISCONTINUED | OUTPATIENT
Start: 2024-09-03 | End: 2024-09-12 | Stop reason: HOSPADM

## 2024-09-03 RX ORDER — INSULIN GLARGINE 100 [IU]/ML
30 INJECTION, SOLUTION SUBCUTANEOUS DAILY
Status: DISCONTINUED | OUTPATIENT
Start: 2024-09-03 | End: 2024-09-04

## 2024-09-03 RX ORDER — NOREPINEPHRINE BITARTRATE 0.06 MG/ML
1-100 INJECTION, SOLUTION INTRAVENOUS CONTINUOUS
Status: DISCONTINUED | OUTPATIENT
Start: 2024-09-03 | End: 2024-09-03

## 2024-09-03 RX ORDER — CLOPIDOGREL BISULFATE 75 MG/1
75 TABLET ORAL DAILY
Status: DISCONTINUED | OUTPATIENT
Start: 2024-09-03 | End: 2024-09-07

## 2024-09-03 RX ORDER — HYDROCODONE BITARTRATE AND ACETAMINOPHEN 7.5; 325 MG/1; MG/1
1 TABLET ORAL EVERY 4 HOURS PRN
Status: DISCONTINUED | OUTPATIENT
Start: 2024-09-03 | End: 2024-09-12 | Stop reason: HOSPADM

## 2024-09-03 RX ORDER — BENZONATATE 100 MG/1
100 CAPSULE ORAL 3 TIMES DAILY PRN
Status: DISCONTINUED | OUTPATIENT
Start: 2024-09-03 | End: 2024-09-12 | Stop reason: HOSPADM

## 2024-09-03 RX ORDER — INSULIN LISPRO 100 [IU]/ML
0-4 INJECTION, SOLUTION INTRAVENOUS; SUBCUTANEOUS NIGHTLY
Status: DISCONTINUED | OUTPATIENT
Start: 2024-09-03 | End: 2024-09-12 | Stop reason: HOSPADM

## 2024-09-03 RX ORDER — IOPAMIDOL 755 MG/ML
125 INJECTION, SOLUTION INTRAVASCULAR
Status: COMPLETED | OUTPATIENT
Start: 2024-09-03 | End: 2024-09-03

## 2024-09-03 RX ORDER — SODIUM CHLORIDE 0.9 % (FLUSH) 0.9 %
5-40 SYRINGE (ML) INJECTION EVERY 12 HOURS SCHEDULED
Status: DISCONTINUED | OUTPATIENT
Start: 2024-09-03 | End: 2024-09-09 | Stop reason: SDUPTHER

## 2024-09-03 RX ORDER — ASPIRIN 81 MG/1
81 TABLET, CHEWABLE ORAL DAILY
Status: DISCONTINUED | OUTPATIENT
Start: 2024-09-03 | End: 2024-09-05

## 2024-09-03 RX ORDER — SODIUM CHLORIDE 9 MG/ML
INJECTION, SOLUTION INTRAVENOUS PRN
Status: DISCONTINUED | OUTPATIENT
Start: 2024-09-03 | End: 2024-09-06 | Stop reason: SDUPTHER

## 2024-09-03 RX ORDER — 0.9 % SODIUM CHLORIDE 0.9 %
500 INTRAVENOUS SOLUTION INTRAVENOUS ONCE
Status: COMPLETED | OUTPATIENT
Start: 2024-09-03 | End: 2024-09-03

## 2024-09-03 RX ORDER — INSULIN LISPRO 100 [IU]/ML
0-4 INJECTION, SOLUTION INTRAVENOUS; SUBCUTANEOUS
Status: DISCONTINUED | OUTPATIENT
Start: 2024-09-03 | End: 2024-09-12 | Stop reason: HOSPADM

## 2024-09-03 RX ORDER — SEMAGLUTIDE 1.34 MG/ML
INJECTION, SOLUTION SUBCUTANEOUS
Status: ON HOLD | COMMUNITY
Start: 2024-07-05

## 2024-09-03 RX ORDER — ATORVASTATIN CALCIUM 20 MG/1
40 TABLET, FILM COATED ORAL NIGHTLY
Status: DISCONTINUED | OUTPATIENT
Start: 2024-09-03 | End: 2024-09-12 | Stop reason: HOSPADM

## 2024-09-03 RX ORDER — BUSPIRONE HYDROCHLORIDE 5 MG/1
TABLET ORAL
Status: ON HOLD | COMMUNITY

## 2024-09-03 RX ORDER — ONDANSETRON 4 MG/1
4 TABLET, ORALLY DISINTEGRATING ORAL EVERY 8 HOURS PRN
Status: DISCONTINUED | OUTPATIENT
Start: 2024-09-03 | End: 2024-09-03

## 2024-09-03 RX ORDER — TRAZODONE HYDROCHLORIDE 100 MG/1
100 TABLET ORAL NIGHTLY
Status: DISCONTINUED | OUTPATIENT
Start: 2024-09-03 | End: 2024-09-12 | Stop reason: HOSPADM

## 2024-09-03 RX ORDER — POLYETHYLENE GLYCOL 3350 17 G/17G
17 POWDER, FOR SOLUTION ORAL DAILY PRN
Status: DISCONTINUED | OUTPATIENT
Start: 2024-09-03 | End: 2024-09-12 | Stop reason: HOSPADM

## 2024-09-03 RX ORDER — SODIUM CHLORIDE 0.9 % (FLUSH) 0.9 %
5-40 SYRINGE (ML) INJECTION PRN
Status: DISCONTINUED | OUTPATIENT
Start: 2024-09-03 | End: 2024-09-09 | Stop reason: SDUPTHER

## 2024-09-03 RX ORDER — GABAPENTIN 300 MG/1
300 CAPSULE ORAL NIGHTLY
Status: DISCONTINUED | OUTPATIENT
Start: 2024-09-03 | End: 2024-09-05

## 2024-09-03 RX ORDER — ACETAMINOPHEN 325 MG/1
650 TABLET ORAL EVERY 4 HOURS PRN
Status: DISCONTINUED | OUTPATIENT
Start: 2024-09-03 | End: 2024-09-12 | Stop reason: HOSPADM

## 2024-09-03 RX ORDER — ONDANSETRON 2 MG/ML
4 INJECTION INTRAMUSCULAR; INTRAVENOUS EVERY 6 HOURS PRN
Status: DISCONTINUED | OUTPATIENT
Start: 2024-09-03 | End: 2024-09-12 | Stop reason: HOSPADM

## 2024-09-03 RX ORDER — ACETAMINOPHEN 325 MG/1
650 TABLET ORAL EVERY 6 HOURS PRN
Status: DISCONTINUED | OUTPATIENT
Start: 2024-09-03 | End: 2024-09-03 | Stop reason: SDUPTHER

## 2024-09-03 RX ORDER — FUROSEMIDE 20 MG
10 TABLET ORAL 2 TIMES DAILY
Status: DISCONTINUED | OUTPATIENT
Start: 2024-09-03 | End: 2024-09-03

## 2024-09-03 RX ORDER — ENOXAPARIN SODIUM 100 MG/ML
30 INJECTION SUBCUTANEOUS 2 TIMES DAILY
Status: DISCONTINUED | OUTPATIENT
Start: 2024-09-03 | End: 2024-09-05

## 2024-09-03 RX ORDER — IPRATROPIUM BROMIDE AND ALBUTEROL SULFATE 2.5; .5 MG/3ML; MG/3ML
1 SOLUTION RESPIRATORY (INHALATION)
Status: DISCONTINUED | OUTPATIENT
Start: 2024-09-03 | End: 2024-09-12 | Stop reason: HOSPADM

## 2024-09-03 RX ORDER — METOPROLOL TARTRATE 25 MG/1
25 TABLET, FILM COATED ORAL DAILY
Status: DISCONTINUED | OUTPATIENT
Start: 2024-09-03 | End: 2024-09-03

## 2024-09-03 RX ADMIN — IPRATROPIUM BROMIDE AND ALBUTEROL SULFATE 1 DOSE: 2.5; .5 SOLUTION RESPIRATORY (INHALATION) at 10:07

## 2024-09-03 RX ADMIN — SODIUM CHLORIDE: 9 INJECTION, SOLUTION INTRAVENOUS at 17:57

## 2024-09-03 RX ADMIN — HYDROCODONE BITARTRATE AND ACETAMINOPHEN 1 TABLET: 7.5; 325 TABLET ORAL at 16:46

## 2024-09-03 RX ADMIN — SODIUM CHLORIDE: 9 INJECTION, SOLUTION INTRAVENOUS at 05:24

## 2024-09-03 RX ADMIN — ENOXAPARIN SODIUM 30 MG: 100 INJECTION SUBCUTANEOUS at 08:24

## 2024-09-03 RX ADMIN — WATER 1000 MG: 1 INJECTION INTRAMUSCULAR; INTRAVENOUS; SUBCUTANEOUS at 23:45

## 2024-09-03 RX ADMIN — SODIUM CHLORIDE 500 ML: 9 INJECTION, SOLUTION INTRAVENOUS at 04:01

## 2024-09-03 RX ADMIN — ASPIRIN 81 MG CHEWABLE TABLET 81 MG: 81 TABLET CHEWABLE at 08:23

## 2024-09-03 RX ADMIN — IPRATROPIUM BROMIDE AND ALBUTEROL SULFATE 1 DOSE: 2.5; .5 SOLUTION RESPIRATORY (INHALATION) at 08:08

## 2024-09-03 RX ADMIN — ATORVASTATIN CALCIUM 40 MG: 40 TABLET, FILM COATED ORAL at 21:06

## 2024-09-03 RX ADMIN — PERFLUTREN 1.5 ML: 6.52 INJECTION, SUSPENSION INTRAVENOUS at 09:13

## 2024-09-03 RX ADMIN — WATER 1000 MG: 1 INJECTION INTRAMUSCULAR; INTRAVENOUS; SUBCUTANEOUS at 02:31

## 2024-09-03 RX ADMIN — ENOXAPARIN SODIUM 30 MG: 100 INJECTION SUBCUTANEOUS at 21:07

## 2024-09-03 RX ADMIN — IPRATROPIUM BROMIDE AND ALBUTEROL SULFATE 1 DOSE: 2.5; .5 SOLUTION RESPIRATORY (INHALATION) at 19:20

## 2024-09-03 RX ADMIN — Medication 5 MCG/MIN: at 05:27

## 2024-09-03 RX ADMIN — SERTRALINE HYDROCHLORIDE 100 MG: 100 TABLET ORAL at 08:23

## 2024-09-03 RX ADMIN — IPRATROPIUM BROMIDE AND ALBUTEROL SULFATE 1 DOSE: 2.5; .5 SOLUTION RESPIRATORY (INHALATION) at 14:20

## 2024-09-03 RX ADMIN — AZITHROMYCIN DIHYDRATE 500 MG: 500 INJECTION, POWDER, LYOPHILIZED, FOR SOLUTION INTRAVENOUS at 02:33

## 2024-09-03 RX ADMIN — ACETAMINOPHEN 650 MG: 325 TABLET ORAL at 12:47

## 2024-09-03 RX ADMIN — CLOPIDOGREL BISULFATE 75 MG: 75 TABLET ORAL at 08:23

## 2024-09-03 RX ADMIN — SODIUM CHLORIDE 500 ML: 9 INJECTION, SOLUTION INTRAVENOUS at 01:24

## 2024-09-03 RX ADMIN — IOPAMIDOL 125 ML: 755 INJECTION, SOLUTION INTRAVENOUS at 01:00

## 2024-09-03 RX ADMIN — BUSPIRONE HYDROCHLORIDE 5 MG: 5 TABLET ORAL at 08:24

## 2024-09-03 RX ADMIN — AZITHROMYCIN MONOHYDRATE 500 MG: 500 INJECTION, POWDER, LYOPHILIZED, FOR SOLUTION INTRAVENOUS at 23:50

## 2024-09-03 RX ADMIN — GABAPENTIN 300 MG: 300 CAPSULE ORAL at 21:06

## 2024-09-03 RX ADMIN — TRAZODONE HYDROCHLORIDE 100 MG: 100 TABLET ORAL at 21:06

## 2024-09-03 SDOH — ECONOMIC STABILITY: FOOD INSECURITY: WITHIN THE PAST 12 MONTHS, YOU WORRIED THAT YOUR FOOD WOULD RUN OUT BEFORE YOU GOT MONEY TO BUY MORE.: NEVER TRUE

## 2024-09-03 SDOH — ECONOMIC STABILITY: INCOME INSECURITY: HOW HARD IS IT FOR YOU TO PAY FOR THE VERY BASICS LIKE FOOD, HOUSING, MEDICAL CARE, AND HEATING?: NOT HARD AT ALL

## 2024-09-03 SDOH — ECONOMIC STABILITY: INCOME INSECURITY: IN THE PAST 12 MONTHS, HAS THE ELECTRIC, GAS, OIL, OR WATER COMPANY THREATENED TO SHUT OFF SERVICE IN YOUR HOME?: NO

## 2024-09-03 ASSESSMENT — PAIN DESCRIPTION - ORIENTATION
ORIENTATION: RIGHT;LEFT;ANTERIOR
ORIENTATION: RIGHT;LEFT;ANTERIOR

## 2024-09-03 ASSESSMENT — PAIN DESCRIPTION - DESCRIPTORS
DESCRIPTORS: ACHING;DISCOMFORT
DESCRIPTORS: ACHING

## 2024-09-03 ASSESSMENT — PATIENT HEALTH QUESTIONNAIRE - PHQ9
1. LITTLE INTEREST OR PLEASURE IN DOING THINGS: NOT AT ALL
SUM OF ALL RESPONSES TO PHQ9 QUESTIONS 1 & 2: 0
2. FEELING DOWN, DEPRESSED OR HOPELESS: NOT AT ALL
SUM OF ALL RESPONSES TO PHQ QUESTIONS 1-9: 0

## 2024-09-03 ASSESSMENT — PAIN DESCRIPTION - LOCATION
LOCATION: HEAD
LOCATION: HEAD

## 2024-09-03 ASSESSMENT — PAIN SCALES - GENERAL
PAINLEVEL_OUTOF10: 8
PAINLEVEL_OUTOF10: 8
PAINLEVEL_OUTOF10: 0

## 2024-09-03 ASSESSMENT — PAIN - FUNCTIONAL ASSESSMENT
PAIN_FUNCTIONAL_ASSESSMENT: PREVENTS OR INTERFERES SOME ACTIVE ACTIVITIES AND ADLS
PAIN_FUNCTIONAL_ASSESSMENT: PREVENTS OR INTERFERES SOME ACTIVE ACTIVITIES AND ADLS

## 2024-09-03 NOTE — ED NOTES
Pts first IV infiltrated in CT. This RN obtained another IV after. Pt is a difficult IV stick, which lead to a delay in obtaining a new IV.

## 2024-09-03 NOTE — PROGRESS NOTES
Report called to DOMINIC Argueta, on fourth floor.    Electronically signed by Vilma Lal RN on 9/3/2024 at 2:22 PM

## 2024-09-03 NOTE — CONSULTS
[facility] Consult        Patient:   Mary Lira  MR#:    [id number]  Account Number:                   [id number]      Room:    0146/146-01   YOB: 1975  Date of Progress Note: 9/3/2024  Time of Note                           7:40 AM  Attending Physician:  Carlos Albarran DO  Consulting Physician:   Sandeep Diaz M.D.        CHIEF COMPLAINT: Right-sided weakness/slurred speech/hypotension/hypoglycemia      HISTORY OF PRESENT ILLNESS:   This 48 y.o. female with past medical history significant for atrial fibrillation not on anticoagulation, history of GI bleed, diabetes, CHF and hyperlipidemia is seen for evaluation of right-sided weakness and confusion.  On the day of admission the patient was last known well around 9 PM.  She went to sleep.  A few hours later the family awakened her when her Dexcom notified that her blood sugar was 30.  She was confused and disoriented when awoken.  She was found to have right-sided weakness and slurred speech.  She was brought in for evaluation.  She has been coughing for about 1 week along with some diarrhea.  She denies any previous history of stroke.  She is not on blood thinners.  CT of the head with no acute changes.  CT of the head and neck with no evidence of thrombus but with severe left carotid stenosis approximately approximately 80-90%.  She was transferred to the ICU and placed on pressors.  EKG with sinus rhythm.  Chest x-ray nodular opacities in the left upper lung.  Troponin borderline elevated.  Respiratory panel negative.  Serum creatinine elevated 1.2.  Discussions were held with ER physician approximately 4 hours and 15 minutes last known well.  Exam at that time revealed some drift in the right arm and right leg.        REVIEW OF SYSTEMS:  Constitutional - No fever or chills.  No diaphoresis or significant fatigue.  HENT -  No tinnitus or significant hearing loss.  Eyes - no sudden vision change or eye pain  Respiratory - no

## 2024-09-03 NOTE — ED PROVIDER NOTES
[facility] EMERGENCY DEPT  eMERGENCY dEPARTMENT eNCOUnter      Pt Name: Mary Lira  MRN: [id number]  Birthdate 1975  Date of evaluation: 9/3/2024  Provider: Latoya Moffett MD    CHIEF COMPLAINT       Chief Complaint   Patient presents with    Seizures    Hypoglycemia           Altered Mental Status         HISTORY OF PRESENT ILLNESS   (Location/Symptom, Timing/Onset,Context/Setting, Quality, Duration, Modifying Factors, Severity)  Note limiting factors.     HPI    Mary Lira is a 48 y.o. female with PMH of hypertension, hyperlipidemia, CHF, diabetes, A-fib on digoxin and flecainide who presents to the emergency department with CC of stroke alert.  Patient went to bed at 9 PM which was her last known well.  Patient's Dexcom alerted her family that her glucose was low in the 30s.  Family woke her up and gave her sweets and called EMS.  They note that she was very lethargic upon awakening.  Upon EMS arrival, her sugar was up in the 150s, but she was noted to have slurred speech and right arm and leg weakness.  She has no prior history of stroke, and no right-sided weakness at baseline.  Patient arrives to the ED roughly 3 - 3.5 hours since last known well.     She is also noted to be coughing, hypotensive, and tachypneic.  She was hypotensive 88/61.  Patient was also briefly hypoxic to the high 80s requiring 2 L nasal cannula.  Patient reported she has been sick with cough, congestion, respiratory symptoms for the last 3 weeks.    NursingNotes were reviewed.    REVIEW OF SYSTEMS    (2-9 systems for level 4, 10 or more for level 5)     Review of Systems   Constitutional:  Negative for chills, fatigue and fever.   HENT:  Negative for congestion and sore throat.    Eyes:  Negative for pain and redness.   Respiratory:  Positive for cough and shortness of breath. Negative for chest tightness.    Cardiovascular:  Negative for chest pain and leg swelling.   Gastrointestinal:  Negative for abdominal pain, diarrhea, nausea  Normal  Best Language (9): No aphasia  Dysarthria (10): Normal  Extinction and Inattention (11): No abnormality  Total: 6Glasgow Coma Scale  Eye Opening: Spontaneous  Best Verbal Response: Oriented  Best Motor Response: Obeys commands  [place] Coma Scale Score: 15        PHYSICAL EXAM    (up to 7 for level 4, 8 or more for level 5)     ED Triage Vitals   BP Systolic BP Percentile Diastolic BP Percentile Temp Temp Source Pulse Respirations SpO2   09/03/24 0033 -- -- 09/03/24 0108 09/03/24 0108 09/03/24 0033 09/03/24 0033 09/03/24 0033   (!) 88/61   97.9 °F (36.6 °C) Oral 71 18 91 %      Height Weight         09/03/24 0033 --         1.6 m (5' 3\")              Physical Exam  Vitals and nursing note reviewed.   Constitutional:       General: She is not in acute distress.     Appearance: She is normal weight. She is not toxic-appearing.   HENT:      Head: Normocephalic and atraumatic.      Right Ear: External ear normal.      Left Ear: External ear normal.      Nose: No congestion.      Mouth/Throat:      Mouth: Mucous membranes are moist.      Pharynx: Oropharynx is clear.   Eyes:      General: No scleral icterus.     Extraocular Movements: Extraocular movements intact.   Cardiovascular:      Rate and Rhythm: Normal rate and regular rhythm.      Pulses: Normal pulses.      Heart sounds: No murmur heard.  Pulmonary:      Effort: Pulmonary effort is normal. No respiratory distress.      Breath sounds: Rhonchi present. No wheezing or rales.   Abdominal:      General: There is no distension.      Palpations: Abdomen is soft.      Tenderness: There is no abdominal tenderness.   Musculoskeletal:      Cervical back: Neck supple.      Right lower leg: No edema.      Left lower leg: No edema.   Skin:     General: Skin is warm and dry.      Capillary Refill: Capillary refill takes less than 2 seconds.      Coloration: Skin is not jaundiced.   Neurological:      General: No focal deficit present.      Mental Status: She is alert

## 2024-09-03 NOTE — PROGRESS NOTES
Notified Dr Palencia of prelim carotid US finding.    Electronically signed by Vilma Lal RN on 9/3/2024 at 9:23 AM

## 2024-09-03 NOTE — PROGRESS NOTES
Patient transferred to fourth floor.  Dexcom transmitter given to Aunt.  Patient has cell phone and .    Called and updated fourth floor nursing on Vascular rounding/plans and cardio consult placed and completed.    Electronically signed by Vilma Lal RN on 9/3/2024 at 5:27 PM

## 2024-09-03 NOTE — PROGRESS NOTES
Facility/Department: [facility] ICU  Initial Speech/Language/Cognitive/Swallow Assessment    NAME: Mary Lira  : 1975   MRN: [id number]  ADMISSION DATE: 9/3/2024  ADMITTING DIAGNOSIS: has Hypertension; Hyperlipidemia; Pneumonia due to other specified bacteria (HCC); Acute ischemic stroke (HCC); Hypotension; Diarrhea; Diabetes (HCC); Essential hypertension; Hemiplegia affecting dominant side (HCC); and Stenosis of left carotid artery on their problem list.    Date of Eval: 9/3/2024   Evaluating Therapist: JOSE J Shaw    Pain:  Pain Assessment  Pain Assessment: 0-10  Pain Level: 0  Patient's Stated Pain Goal: 0 - No pain    Assessment:  Completed MINI MENTAL STATE EXAMINATION and patient obtained 24 out of 30 possible points, indicative of mild cognitive-linguistic impairment (patient was 2/3 recall, 0/3 multi-step commands, 0/1 writing of sentence, and 0/1 copy design). However, it is noted that patient was unable to utilize right hand to complete multi-step commands, writing, and copy design. Subsequently transitioned to full assessment and patient exhibited mild dysarthria, slow processing, mildly delayed auditory comprehension of increased complexity, mildly delayed structured responsive speech, and decreased short-term memory.    Patient verbalized current PCP and pharmacy at independent level. While delayed, patient verbalized conditions in which she takes medications independently. Patient demonstrated ability to verbalize appropriate simple solutions to situations that could occur during activities of daily living at independent level.    Also evaluated patient's swallowing function. Patient exhibited decreased oral prep of more solid consistencies, fast oral transit and suspected swallow delay with thinner liquid consistencies, and sluggish laryngeal elevation for swallow airway protection. No outward S/S penetration/aspiration was observed with any ice chip trial, puree consistency trial, regular  solid consistency trial, or nectar thick liquid trial presented during assessment this date. Belches and delayed coughs were noted with thin H2O trials.    At this time, do suspect delayed epiglottic inversion. Would trial easy to chew pre-cut foods with mildly thick/nectar thick liquids. Recommend meds whole in pudding/applesauce. If patient receives mouth care prior to intake, okay for ice chips and sips regular water IN BETWEEN MEALS for comfort. Will continue to follow. Thank you for this consult.    Goals:  Short-term Goals  Timeframe for Short-term Goals: 1-2x/day for 3 days  Goal 1: Patient will tolerate easy to chew pre-cut foods and mildly thick/nectar thick liquids with min S/S penetration/aspiration during PO intake.  Goal 2: Re-assessment of swallow function for potential diet upgrade.  Goal 3: Patient will complete breath support, oral motor, lingual, and pharyngeal exercises with 80% of opportunities and with provision of min cues/prompts.  Goal 4: Patient will utilize tools/strategies to promote increased clarity of speech at word, phrase, and sentence level with 80% of opportunities and with provision of mod cues/prompts.   Goal 5: Re-assessment of speech/language/cognitive functioning.  Goal 6: Patient will complete attention and processing tasks with 80% accuracy and with provision of min cues/prompts.  Goal 7: Patient will complete memory functioning tasks with 80% accuracy and with provision of min cues/prompts.    Subjective:  Chart Reviewed: Yes  Patient assessed for rehabilitation services?: Yes  Family / Caregiver Present: No     Objective   Oral Motor:   Labial ROM: Decreased, bilaterally, during labial retraction trials and labial protrusion trials.  Labial Strength: Decreased during labial compression trials.  Labial Coordination: Slowed movements were noted.  Lingual ROM: Adequate during lingual extension trials with full point achieved; decreased during lingual elevation trials without

## 2024-09-03 NOTE — PROGRESS NOTES
Patient back from MRI, tolerated well.    Electronically signed by Vilma Lal RN on 9/3/2024 at 12:35 PM

## 2024-09-03 NOTE — PROGRESS NOTES
[facility]  Consult Note       NAME:  Mary Lira  MEDICAL RECORD NUMBER:  [id number]  AGE: 48 y.o.   GENDER: female  : 1975  TODAY'S DATE:  9/3/2024    Subjective   Reason for Wound Nurse Evaluation and Assessment: excoriation to abdominal folds and gluteal cleft      Mary Lira is a 48 y.o. female referred by:   [] Physician  [x] Nursing  [] Other:     Wound Identification:  Wound Type:  Moisture associated skin damage  Contributing Factors: obesity and incontinence of stool    Wound History: NA    Current Wound Care Treatment:      Interdry AG to abdominal folds. Cut the cloth so that 2 inches will be exposed to air for proper moisture wicking. Change every 3 days and PRN    Apply zinc paste to gluteal cleft and any other areas at risk for moisture related breakdown twice daily and PRN    Patient Goal of Care:  [x] Wound Healing  [] Odor Control  [] Palliative Care  [] Pain Control   [] Other:         PAST MEDICAL HISTORY        Diagnosis Date    Anxiety     Apnea     CHF (congestive heart failure) (HCC)     Diabetes mellitus (HCC)     Hyperlipidemia     Hypertension        PAST SURGICAL HISTORY    Past Surgical History:   Procedure Laterality Date    APPENDECTOMY         FAMILY HISTORY    Family History   Problem Relation Age of Onset    Seizures Mother     Cancer Father     Stroke Father 40    Diabetes Sister     Diabetes Sister        SOCIAL HISTORY    Social History     Tobacco Use    Smoking status: Never    Smokeless tobacco: Never   Substance Use Topics    Alcohol use: Not Currently    Drug use: Never       ALLERGIES    Allergies   Allergen Reactions    Ibuprofen     Levaquin [Levofloxacin]     Sulfa Antibiotics     Toradol [Ketorolac Tromethamine]     Zomig [Zolmitriptan]        MEDICATIONS    No current facility-administered medications on file prior to encounter.     Current Outpatient Medications on File Prior to Encounter   Medication Sig Dispense Refill    Semaglutide,0.25

## 2024-09-03 NOTE — H&P
Hospitalist: History and Physical    Date: 9/3/2024 Time: 3:23 AM    Name: Mary Lira : 1975 MRN: [id number]    Code Status: Full Code No additional code details    PCP: Bruno Manning MD    Patient's Chief Complaint Is: Stroke alert  HPI: Patient is a 48 y.o. female with a past medical history of DM, CHF, HTN, HLD, Anxiety and Sleep Apnea. Patient presented to [facility] ED due to concern for stroke like symptoms. Patient's last known well was at 900 pm when she went to bed. Family got notification by Dexcom that blood sugar was 30. When family checked on her, she was confused and not making sense. EMS was called. EMS rechecked blood sugar and it was 150. She was found to have right sided weakness with slurred speech. She endorsed headache, shortness of breath, cough, nausea and diarrhea. She admitted that cough is productive of green sputum. She said that cough has been going on for 1 week. She denied fever, visual deficit, vomiting, chest pain, diarrhea or dysuria.  Blood work obtained in ED significant for Leukocytosis, Hg 10.5, Glucose 142, CR 12. And BUN 21.  CT brain did not report any acute intracranial abnormality  CTA Head/Neck reported 90% stenosis left ICA, 60% right ICA  Chest xray reported opacity left upper lobe.  She received IV Ceftriaxone and IV Azithromycin in ED.  Neurology and Vascular surgery consulted      Past Medical History:   Diagnosis Date    Anxiety     Apnea     CHF (congestive heart failure) (HCC)     Diabetes mellitus (HCC)     Hyperlipidemia     Hypertension      Past Surgical History:   Procedure Laterality Date    APPENDECTOMY       Family History   Problem Relation Age of Onset    Seizures Mother     Cancer Father     Stroke Father 40    Diabetes Sister     Diabetes Sister      Social History     Socioeconomic History    Marital status:      Spouse name: Not on file    Number of children: Not on file    Years of education: Not on file    Highest education  PCR Not Detected Not Detected    Bordetella parapertussis by PCR Not Detected Not Detected    Bordetella pertussis by PCR Not Detected Not Detected    Chlamydophilia pneumoniae by PCR Not Detected Not Detected    Coronavirus 229E by PCR Not Detected Not Detected    Coronavirus HKU1 by PCR Not Detected Not Detected    Coronavirus NL63 by PCR Not Detected Not Detected    Coronavirus OC43 by PCR Not Detected Not Detected    SARS-CoV-2, PCR Not Detected Not Detected    Human Metapneumovirus by PCR Not Detected Not Detected    Human Rhinovirus/Enterovirus by PCR Not Detected Not Detected    Influenza A by PCR Not Detected Not Detected    Influenza B by PCR Not Detected Not Detected    Mycoplasma pneumoniae by PCR Not Detected Not Detected    Parainfluenza Virus 1 by PCR Not Detected Not Detected    Parainfluenza Virus 2 by PCR Not Detected Not Detected    Parainfluenza Virus 3 by PCR Not Detected Not Detected    Parainfluenza Virus 4 by PCR Not Detected Not Detected    Respiratory Syncytial Virus by PCR Not Detected Not Detected   EKG 12 Lead    Collection Time: 09/03/24  1:06 AM   Result Value Ref Range    P-R Interval 152 ms    QRS Duration 96 ms    Q-T Interval 470 ms    QTc Calculation (Bazett) 482 ms    P Axis 13 degrees    T Axis 37 degrees       Imaging: XR CHEST PORTABLE    Result Date: 9/3/2024  EXAM:  FRONTAL VIEW OF THE CHEST.  HISTORY:  Stroke evaluation.  COMPARISON:  None.  FINDINGS: Atherosclerotic calcifications of the aorta.  Normal heart size.  Nodule like opacities in the left upper lung.  No visible pleural effusion or pneumothorax.  No acute osseous abnormality.       Nodule like opacities in the left upper lung.  Recommend follow-up chest CT.  Calcified atherosclerosis.     ______________________________________ Electronically signed by: ARNOLD HARRISON M.D. Date:     09/03/2024 Time:    01:52     CTA HEAD NECK W CONTRAST    Result Date: 9/3/2024  EXAM: CTA HEAD AND NECK WITH CONTRAST  TECHNIQUE: CT

## 2024-09-03 NOTE — PROGRESS NOTES
Vascular Lab preliminary report  Carotid duplex exam shows 70-99% stenosis B/L ICA. Vertebral arteries antegrade. Final report pending.

## 2024-09-03 NOTE — PROGRESS NOTES
4 Eyes Skin Assessment     NAME:  Mary Lira  YOB: 1975  MEDICAL RECORD NUMBER:  [id number]    The patient is being assessed for  Transfer to New Unit    I agree that at least one RN has performed a thorough Head to Toe Skin Assessment on the patient. ALL assessment sites listed below have been assessed.      Areas assessed by both nurses:    Head, Face, Ears, Shoulders, Back, Chest, Arms, Elbows, Hands, Sacrum. Buttock, Coccyx, Ischium, and Legs. Feet and Heels        Does the Patient have a Wound? Yes wound(s) were present on assessment. LDA wound assessment was Initiated and completed by RN       Daryl Prevention initiated by RN: Yes  Wound Care Orders initiated by RN: Yes    Pressure Injury (Stage 3,4, Unstageable, DTI, NWPT, and Complex wounds) if present, place Wound referral order by RN under : No    New Ostomies, if present place, Ostomy referral order under : No     Nurse 1 eSignature: Electronically signed by Karrie Whitehead RN on 9/3/24 at 6:16 PM CDT    **SHARE this note so that the co-signing nurse can place an eSignature**    Nurse 2 eSignature: {Esignature:[id number]}

## 2024-09-03 NOTE — ED NOTES
0012 [place] [employer] called to notify us that [facility] was bringing a stroke alert with LKW 2330. Symptoms of right side weakness and seizure like activity. CT, CN, primary nurse and MD notified  0025 EMS called over radio with 3-4 minute ETA  0032 Dr. Moffett called code stroke. CT notified and called overhead in ER.   0049 Notified Dr. Diaz of code stroke and LKW (neurology)  0050 Notified Tiffanie (stroke coordinator)

## 2024-09-03 NOTE — PROGRESS NOTES
Mary Lira received from ICU to room # 426 .  Mental Status: Patient is oriented, alert, coherent, logical, thought processes intact, and able to concentrate and follow conversation.   Vitals:    09/03/24 1735   BP: (!) 143/74   Pulse: 87   Resp: 16   Temp: 97.1 °F (36.2 °C)   SpO2: 99%     Placed on cardiac monitor: No.  Belongings: Glasses, cell phone  with patient at bedside .  Family at bedside Yes.  Oriented Patient to room.  Call light within reach. Yes.  Transfer was: Well tolerated by patient..    Electronically signed by Karrie Whitehead RN on 9/3/2024 at 5:39 PM

## 2024-09-03 NOTE — PROGRESS NOTES
Radiologist reported (+) ischemia on CT brain perfusion, notified Dr Diaz.    Electronically signed by Vilma Lal RN on 9/3/2024 at 7:48 AM

## 2024-09-03 NOTE — PROGRESS NOTES
Impaired  Initiation: Impaired  Sequencing: Impaired  Cognition Comment: slow to process    Objective  Temp: 97.6 °F (36.4 °C)  Pulse: 80  Heart Rate Source: Monitor  Respirations: 18  SpO2: 94 %  O2 Device: Nasal cannula  BP: 117/61  MAP (Calculated): 80  BP Location: Left lower arm  BP Method: Automatic  Patient Position: Semi fowlers     Observation/Palpation  Posture: Good  Observation: mepilex on sacrum, IV, long, fecal management system, BP cuff, O2 sat monitor, telemetry, 1LO2  Gross Assessment  Sensation: Impaired (decreased sensation RUE)    AROM RLE (degrees)  RLE AROM: Exceptions  RLE General AROM: 0-90 knee and hip, ankle to neutral DF  AROM LLE (degrees)  LLE AROM : WFL  AROM RUE (degrees)  RUE AROM : Exceptions  RUE General AROM: AAROM 0-90 shoulder and elbow, hand WFLs AAROM  AROM LUE (degrees)  LUE AROM : WFL  Strength RLE  Strength RLE: Exception  Comment: grossly 2-/5  Strength LLE  Strength LLE: WFL  Comment: grossly 4/5           Bed mobility  Supine to Sit: Moderate assistance  Sit to Supine: Unable to assess  Scooting: Moderate assistance  Bed Mobility Comments: able to scoot to EOB  Transfers  Sit to Stand: Moderate Assistance  Stand to Sit: Moderate Assistance  Bed to Chair: Dependent/Total  Lateral Transfers: Dependent/Total  Comment: to chair with SS  Ambulation  Comments: unable to step to chair, used SS     Balance  Posture: Fair  Sitting - Static: Fair;+  Sitting - Dynamic: Fair;-  Standing - Static: Poor;+  Standing - Dynamic: Poor;+          OutComes Score                                                  AM-PAC - Mobility              Tinneti Score       Goals  Short Term Goals  Time Frame for Short Term Goals: 2 wks  Short Term Goal 1: supine to sit indep  Short Term Goal 2: sit to stand indep  Short Term Goal 3: amb. 200' with RW SBA  Short Term Goal 4: bed to chair SBA  Patient Goals   Patient Goals : go home, get stronger       Education  Patient Education  Education Given To:

## 2024-09-03 NOTE — PROGRESS NOTES
Follow up note from this morning.  She is doing much better.  She is now off of pressors.  PT/OT following.  Stable for transfer to med/surg.

## 2024-09-03 NOTE — PROGRESS NOTES
09/03/24 1723   Encounter Summary   Encounter Overview/Reason Initial Encounter   Service Provided For Patient and family together  (with aunt at bedside in ICU)   Referral/Consult From Rounding   Support System Family members   Complexity of Encounter Moderate   Begin Time 1000   End Time  1045   Total Time Calculated 45 min   Spiritual/Emotional needs   Type Spiritual Support   Assessment/Intervention/Outcome   Assessment Coping;Hopeful   Intervention Active listening;Discussed belief system/Lutheran practices/jeramy;Prayer (assurance of)/Vaughn   Outcome Expressed feelings, needs, and concerns   Plan and Referrals   Plan/Referrals Continue to visit, (comment)   Does the patient have a [facility] PCP? Yes    to follow up after discharge? No     Ms. Lira is disabled with support of her aunt. We will continue ACP Conversation tomorrow in hope of completion of ACP Note.    Electronically signed by Chaplain Светлана Juarez on 9/3/2024 at 5:27 PM

## 2024-09-03 NOTE — ED NOTES
Patient Pre-Arrival Note    Date of Note:   9/3/2024  Time of Note:  12:30 AM    Patient Name:  Mary Lira  MRN:    [id number]  YOB: 1975  Age:        48 y.o.  Gender:       female      EMS   EMSService: Siomara radio report at 0025      ETA    NUMBER: 4 minutes      EMS Code Stroke      YES / NO: Yes     Patient's stroke-like symptoms per EMS   Stroke symptoms: weakness of right arm     EMS glucose 140     [place] Stroke Scale YES/NO: YES    CSS Score [place] Stroke Scale: Not reported     Last Known Well Date & Time    9-2-24 5520     Notifications     Dr. Dr. Moffett  notified of patient PTA at 0012   CT Tech notified PTA at 0012   ED CN notified PTA at 0012   Primary RN notified PTA at 0012       Nurse eSignature:  Mago Martins  Date:   9/3/2024   Time:   12:30 AM

## 2024-09-04 ENCOUNTER — APPOINTMENT (OUTPATIENT)
Dept: CT IMAGING | Age: 49
End: 2024-09-04
Payer: MEDICAID

## 2024-09-04 PROBLEM — R29.90 STROKE-LIKE SYMPTOMS: Status: ACTIVE | Noted: 2024-09-04

## 2024-09-04 LAB
ANION GAP SERPL CALCULATED.3IONS-SCNC: 11 MMOL/L (ref 7–19)
ANION GAP SERPL CALCULATED.3IONS-SCNC: 9 MMOL/L (ref 7–19)
APTT PPP: 20.5 SEC (ref 26–36.2)
BASOPHILS # BLD: 0.1 K/UL (ref 0–0.2)
BASOPHILS NFR BLD: 0.5 % (ref 0–1)
BNP BLD-MCNC: 1602 PG/ML (ref 0–124)
BUN SERPL-MCNC: 16 MG/DL (ref 6–20)
BUN SERPL-MCNC: 20 MG/DL (ref 6–20)
CALCIUM SERPL-MCNC: 8.6 MG/DL (ref 8.6–10)
CALCIUM SERPL-MCNC: 8.7 MG/DL (ref 8.6–10)
CALCIUM SERPL-MCNC: 9 MG/DL (ref 8.6–10)
CHLORIDE SERPL-SCNC: 105 MMOL/L (ref 98–111)
CHLORIDE SERPL-SCNC: 106 MMOL/L (ref 98–111)
CO2 SERPL-SCNC: 24 MMOL/L (ref 22–29)
CO2 SERPL-SCNC: 26 MMOL/L (ref 22–29)
CREAT SERPL-MCNC: 0.7 MG/DL (ref 0.5–0.9)
CREAT SERPL-MCNC: 0.8 MG/DL (ref 0.5–0.9)
D DIMER PPP FEU-MCNC: 0.33 UG/ML FEU (ref 0–0.48)
EOSINOPHIL # BLD: 1.2 K/UL (ref 0–0.6)
EOSINOPHIL NFR BLD: 7.6 % (ref 0–5)
ERYTHROCYTE [DISTWIDTH] IN BLOOD BY AUTOMATED COUNT: 15.2 % (ref 11.5–14.5)
GLUCOSE BLD-MCNC: 156 MG/DL (ref 70–99)
GLUCOSE BLD-MCNC: 157 MG/DL (ref 70–99)
GLUCOSE BLD-MCNC: 159 MG/DL (ref 70–99)
GLUCOSE BLD-MCNC: 213 MG/DL (ref 70–99)
GLUCOSE SERPL-MCNC: 165 MG/DL (ref 70–99)
GLUCOSE SERPL-MCNC: 172 MG/DL (ref 70–99)
HCT VFR BLD AUTO: 39 % (ref 37–47)
HGB BLD-MCNC: 11.3 G/DL (ref 12–16)
IMM GRANULOCYTES # BLD: 0.1 K/UL
INR PPP: 0.98 (ref 0.88–1.18)
LYMPHOCYTES # BLD: 2.9 K/UL (ref 1.1–4.5)
LYMPHOCYTES NFR BLD: 18.7 % (ref 20–40)
MAGNESIUM SERPL-MCNC: 1.5 MG/DL (ref 1.6–2.6)
MCH RBC QN AUTO: 24.2 PG (ref 27–31)
MCHC RBC AUTO-ENTMCNC: 29 G/DL (ref 33–37)
MCV RBC AUTO: 83.7 FL (ref 81–99)
MONOCYTES # BLD: 0.7 K/UL (ref 0–0.9)
MONOCYTES NFR BLD: 4.4 % (ref 0–10)
NEUTROPHILS # BLD: 10.6 K/UL (ref 1.5–7.5)
NEUTS SEG NFR BLD: 68.3 % (ref 50–65)
PERFORMED ON: ABNORMAL
PHOSPHATE SERPL-MCNC: 2.9 MG/DL (ref 2.5–4.5)
PLATELET # BLD AUTO: 273 K/UL (ref 130–400)
PLATELET SLIDE REVIEW: ADEQUATE
PMV BLD AUTO: 10.6 FL (ref 9.4–12.3)
POTASSIUM SERPL-SCNC: 5.2 MMOL/L (ref 3.5–5)
POTASSIUM SERPL-SCNC: 5.5 MMOL/L (ref 3.5–5)
POTASSIUM SERPL-SCNC: 5.7 MMOL/L (ref 3.5–5)
PROTHROMBIN TIME: 12.7 SEC (ref 12–14.6)
RBC # BLD AUTO: 4.66 M/UL (ref 4.2–5.4)
SODIUM SERPL-SCNC: 140 MMOL/L (ref 136–145)
SODIUM SERPL-SCNC: 141 MMOL/L (ref 136–145)
WBC # BLD AUTO: 15.5 K/UL (ref 4.8–10.8)

## 2024-09-04 PROCEDURE — 97530 THERAPEUTIC ACTIVITIES: CPT

## 2024-09-04 PROCEDURE — 82310 ASSAY OF CALCIUM: CPT

## 2024-09-04 PROCEDURE — 36415 COLL VENOUS BLD VENIPUNCTURE: CPT

## 2024-09-04 PROCEDURE — 94640 AIRWAY INHALATION TREATMENT: CPT

## 2024-09-04 PROCEDURE — 82962 GLUCOSE BLOOD TEST: CPT

## 2024-09-04 PROCEDURE — 6360000002 HC RX W HCPCS: Performed by: STUDENT IN AN ORGANIZED HEALTH CARE EDUCATION/TRAINING PROGRAM

## 2024-09-04 PROCEDURE — 6370000000 HC RX 637 (ALT 250 FOR IP): Performed by: STUDENT IN AN ORGANIZED HEALTH CARE EDUCATION/TRAINING PROGRAM

## 2024-09-04 PROCEDURE — 1200000000 HC SEMI PRIVATE

## 2024-09-04 PROCEDURE — 83735 ASSAY OF MAGNESIUM: CPT

## 2024-09-04 PROCEDURE — 99233 SBSQ HOSP IP/OBS HIGH 50: CPT | Performed by: PSYCHIATRY & NEUROLOGY

## 2024-09-04 PROCEDURE — 85025 COMPLETE CBC W/AUTO DIFF WBC: CPT

## 2024-09-04 PROCEDURE — 97165 OT EVAL LOW COMPLEX 30 MIN: CPT

## 2024-09-04 PROCEDURE — 2500000003 HC RX 250 WO HCPCS: Performed by: HOSPITALIST

## 2024-09-04 PROCEDURE — 83880 ASSAY OF NATRIURETIC PEPTIDE: CPT

## 2024-09-04 PROCEDURE — 2580000003 HC RX 258: Performed by: STUDENT IN AN ORGANIZED HEALTH CARE EDUCATION/TRAINING PROGRAM

## 2024-09-04 PROCEDURE — 76937 US GUIDE VASCULAR ACCESS: CPT

## 2024-09-04 PROCEDURE — 6360000002 HC RX W HCPCS: Performed by: HOSPITALIST

## 2024-09-04 PROCEDURE — 85610 PROTHROMBIN TIME: CPT

## 2024-09-04 PROCEDURE — 84100 ASSAY OF PHOSPHORUS: CPT

## 2024-09-04 PROCEDURE — 93005 ELECTROCARDIOGRAM TRACING: CPT | Performed by: STUDENT IN AN ORGANIZED HEALTH CARE EDUCATION/TRAINING PROGRAM

## 2024-09-04 PROCEDURE — 2700000000 HC OXYGEN THERAPY PER DAY

## 2024-09-04 PROCEDURE — 2500000003 HC RX 250 WO HCPCS: Performed by: STUDENT IN AN ORGANIZED HEALTH CARE EDUCATION/TRAINING PROGRAM

## 2024-09-04 PROCEDURE — 80048 BASIC METABOLIC PNL TOTAL CA: CPT

## 2024-09-04 PROCEDURE — 84132 ASSAY OF SERUM POTASSIUM: CPT

## 2024-09-04 PROCEDURE — 85730 THROMBOPLASTIN TIME PARTIAL: CPT

## 2024-09-04 PROCEDURE — 94760 N-INVAS EAR/PLS OXIMETRY 1: CPT

## 2024-09-04 PROCEDURE — 85379 FIBRIN DEGRADATION QUANT: CPT

## 2024-09-04 PROCEDURE — 99222 1ST HOSP IP/OBS MODERATE 55: CPT | Performed by: INTERNAL MEDICINE

## 2024-09-04 PROCEDURE — 71250 CT THORAX DX C-: CPT

## 2024-09-04 PROCEDURE — 6370000000 HC RX 637 (ALT 250 FOR IP): Performed by: PSYCHIATRY & NEUROLOGY

## 2024-09-04 PROCEDURE — 2709999900 HC NON-CHARGEABLE SUPPLY

## 2024-09-04 RX ORDER — POTASSIUM CHLORIDE 7.45 MG/ML
10 INJECTION INTRAVENOUS PRN
Status: DISCONTINUED | OUTPATIENT
Start: 2024-09-04 | End: 2024-09-12 | Stop reason: HOSPADM

## 2024-09-04 RX ORDER — DEXTROSE MONOHYDRATE 100 MG/ML
INJECTION, SOLUTION INTRAVENOUS CONTINUOUS PRN
Status: DISCONTINUED | OUTPATIENT
Start: 2024-09-04 | End: 2024-09-12 | Stop reason: HOSPADM

## 2024-09-04 RX ORDER — FUROSEMIDE 10 MG/ML
20 INJECTION INTRAMUSCULAR; INTRAVENOUS ONCE
Status: COMPLETED | OUTPATIENT
Start: 2024-09-04 | End: 2024-09-04

## 2024-09-04 RX ORDER — INSULIN GLARGINE 100 [IU]/ML
10 INJECTION, SOLUTION SUBCUTANEOUS DAILY
Status: DISCONTINUED | OUTPATIENT
Start: 2024-09-04 | End: 2024-09-09

## 2024-09-04 RX ORDER — BUTALBITAL, ACETAMINOPHEN AND CAFFEINE 50; 325; 40 MG/1; MG/1; MG/1
1 TABLET ORAL EVERY 6 HOURS PRN
Status: DISCONTINUED | OUTPATIENT
Start: 2024-09-04 | End: 2024-09-12 | Stop reason: HOSPADM

## 2024-09-04 RX ORDER — DILTIAZEM HYDROCHLORIDE 5 MG/ML
10 INJECTION INTRAVENOUS ONCE
Status: COMPLETED | OUTPATIENT
Start: 2024-09-04 | End: 2024-09-04

## 2024-09-04 RX ORDER — MAGNESIUM SULFATE IN WATER 40 MG/ML
2000 INJECTION, SOLUTION INTRAVENOUS ONCE
Status: COMPLETED | OUTPATIENT
Start: 2024-09-04 | End: 2024-09-04

## 2024-09-04 RX ORDER — METOPROLOL TARTRATE 25 MG/1
25 TABLET, FILM COATED ORAL 2 TIMES DAILY
Status: DISCONTINUED | OUTPATIENT
Start: 2024-09-04 | End: 2024-09-05

## 2024-09-04 RX ORDER — MAGNESIUM SULFATE 1 G/100ML
1000 INJECTION INTRAVENOUS PRN
Status: DISCONTINUED | OUTPATIENT
Start: 2024-09-04 | End: 2024-09-12 | Stop reason: HOSPADM

## 2024-09-04 RX ORDER — METOPROLOL TARTRATE 1 MG/ML
5 INJECTION, SOLUTION INTRAVENOUS ONCE
Status: COMPLETED | OUTPATIENT
Start: 2024-09-04 | End: 2024-09-04

## 2024-09-04 RX ORDER — POTASSIUM CHLORIDE 1500 MG/1
40 TABLET, EXTENDED RELEASE ORAL PRN
Status: DISCONTINUED | OUTPATIENT
Start: 2024-09-04 | End: 2024-09-12 | Stop reason: HOSPADM

## 2024-09-04 RX ADMIN — INSULIN GLARGINE 10 UNITS: 100 INJECTION, SOLUTION SUBCUTANEOUS at 08:56

## 2024-09-04 RX ADMIN — TRAZODONE HYDROCHLORIDE 100 MG: 100 TABLET ORAL at 21:04

## 2024-09-04 RX ADMIN — BUTALBITAL, ACETAMINOPHEN AND CAFFEINE 1 TABLET: 325; 50; 40 TABLET ORAL at 13:20

## 2024-09-04 RX ADMIN — FUROSEMIDE 20 MG: 10 INJECTION, SOLUTION INTRAMUSCULAR; INTRAVENOUS at 17:14

## 2024-09-04 RX ADMIN — WATER 1000 MG: 1 INJECTION INTRAMUSCULAR; INTRAVENOUS; SUBCUTANEOUS at 23:56

## 2024-09-04 RX ADMIN — METOPROLOL TARTRATE 25 MG: 25 TABLET, FILM COATED ORAL at 21:19

## 2024-09-04 RX ADMIN — IPRATROPIUM BROMIDE AND ALBUTEROL SULFATE 1 DOSE: 2.5; .5 SOLUTION RESPIRATORY (INHALATION) at 15:10

## 2024-09-04 RX ADMIN — BENZONATATE 100 MG: 100 CAPSULE ORAL at 08:55

## 2024-09-04 RX ADMIN — HYDROCODONE BITARTRATE AND ACETAMINOPHEN 1 TABLET: 7.5; 325 TABLET ORAL at 23:52

## 2024-09-04 RX ADMIN — ENOXAPARIN SODIUM 30 MG: 100 INJECTION SUBCUTANEOUS at 20:02

## 2024-09-04 RX ADMIN — MAGNESIUM SULFATE HEPTAHYDRATE 2000 MG: 40 INJECTION, SOLUTION INTRAVENOUS at 21:23

## 2024-09-04 RX ADMIN — BUSPIRONE HYDROCHLORIDE 5 MG: 5 TABLET ORAL at 08:56

## 2024-09-04 RX ADMIN — ASPIRIN 81 MG CHEWABLE TABLET 81 MG: 81 TABLET CHEWABLE at 08:56

## 2024-09-04 RX ADMIN — SODIUM CHLORIDE, PRESERVATIVE FREE 10 ML: 5 INJECTION INTRAVENOUS at 21:05

## 2024-09-04 RX ADMIN — DILTIAZEM HYDROCHLORIDE 10 MG: 5 INJECTION, SOLUTION INTRAVENOUS at 21:54

## 2024-09-04 RX ADMIN — SERTRALINE HYDROCHLORIDE 100 MG: 100 TABLET ORAL at 08:55

## 2024-09-04 RX ADMIN — IPRATROPIUM BROMIDE AND ALBUTEROL SULFATE 1 DOSE: 2.5; .5 SOLUTION RESPIRATORY (INHALATION) at 20:02

## 2024-09-04 RX ADMIN — HYDROCODONE BITARTRATE AND ACETAMINOPHEN 1 TABLET: 7.5; 325 TABLET ORAL at 02:58

## 2024-09-04 RX ADMIN — GABAPENTIN 300 MG: 300 CAPSULE ORAL at 21:04

## 2024-09-04 RX ADMIN — BUTALBITAL, ACETAMINOPHEN AND CAFFEINE 1 TABLET: 325; 50; 40 TABLET ORAL at 21:03

## 2024-09-04 RX ADMIN — IPRATROPIUM BROMIDE AND ALBUTEROL SULFATE 1 DOSE: 2.5; .5 SOLUTION RESPIRATORY (INHALATION) at 07:23

## 2024-09-04 RX ADMIN — METOPROLOL TARTRATE 5 MG: 5 INJECTION INTRAVENOUS at 18:25

## 2024-09-04 RX ADMIN — HYDROCODONE BITARTRATE AND ACETAMINOPHEN 1 TABLET: 7.5; 325 TABLET ORAL at 17:13

## 2024-09-04 RX ADMIN — HYDROCODONE BITARTRATE AND ACETAMINOPHEN 1 TABLET: 7.5; 325 TABLET ORAL at 08:56

## 2024-09-04 RX ADMIN — ENOXAPARIN SODIUM 30 MG: 100 INJECTION SUBCUTANEOUS at 08:56

## 2024-09-04 RX ADMIN — METOPROLOL TARTRATE 5 MG: 5 INJECTION INTRAVENOUS at 19:47

## 2024-09-04 RX ADMIN — CLOPIDOGREL BISULFATE 75 MG: 75 TABLET ORAL at 08:55

## 2024-09-04 RX ADMIN — ATORVASTATIN CALCIUM 40 MG: 40 TABLET, FILM COATED ORAL at 21:04

## 2024-09-04 ASSESSMENT — PAIN SCALES - GENERAL
PAINLEVEL_OUTOF10: 9
PAINLEVEL_OUTOF10: 9
PAINLEVEL_OUTOF10: 8
PAINLEVEL_OUTOF10: 9
PAINLEVEL_OUTOF10: 8
PAINLEVEL_OUTOF10: 10

## 2024-09-04 ASSESSMENT — PAIN DESCRIPTION - LOCATION
LOCATION: HEAD

## 2024-09-04 NOTE — PROGRESS NOTES
Occupational Therapy Initial Assessment  Date: 2024   Patient Name: Mary Lira  MRN: [id number]     : 1975    Date of Service: 2024    Discharge Recommendations:  Patient would benefit from continued therapy after discharge       Assessment   Assessment: Evaluation completed and tx initiated.  The patient would benefit from further skilled therapy to upgrade safety and functional independence. If discharges home, the patient will need 24/7 supervision or assist until balance improves.  Treatment Diagnosis: PNA, acute ischemic stroke, right side weakness,  REQUIRES OT FOLLOW-UP: Yes  Activity Tolerance  Activity Tolerance: Patient Tolerated treatment well           Patient Diagnosis(es): The primary encounter diagnosis was Stroke-like symptoms. Diagnoses of Leukocytosis, unspecified type, Pneumonia due to infectious organism, unspecified laterality, unspecified part of lung, Carotid stenosis, left, Cerebrovascular accident (CVA), unspecified mechanism (HCC), and Shortness of breath were also pertinent to this visit.   has a past medical history of Anxiety, Apnea, CHF (congestive heart failure) (HCC), Diabetes mellitus (HCC), Hyperlipidemia, and Hypertension.   has a past surgical history that includes Appendectomy.    Treatment Diagnosis: PNA, acute ischemic stroke, right side weakness,      Restrictions  Restrictions/Precautions  Restrictions/Precautions: Fall Risk  Required Braces or Orthoses?: No  Position Activity Restriction  Other position/activity restrictions: NTL at [facility]    Subjective   General  Chart Reviewed: Yes  Patient assessed for rehabilitation services?: Yes  Family / Caregiver Present: Yes (mother, dtr, gson)  Pain Screening  Pain at present: 3 (head)  Scale Used: Numeric Score  Intervention List: Patient able to continue with treatment  Comments / Details: had pain meds prior  Oxygen Therapy  O2 Device: Nasal cannula  O2 Flow Rate (L/min): 2 L/min    Social/Functional  History  Social/Functional History  Lives With: Daughter  Type of Home: House  Home Layout: One level  Home Access: Level entry  Bathroom Shower/Tub: Tub/Shower unit  Bathroom Toilet: Standard  Bathroom Equipment: None  Bathroom Accessibility: Accessible  Home Equipment: None  Receives Help From: Family  ADL Assistance: Independent  Homemaking Assistance: Independent  Homemaking Responsibilities: Yes  Ambulation Assistance: Independent  Transfer Assistance: Independent  Active : Yes  Mode of Transportation: Car  Occupation: On disability       Objective   Hearing: Within functional limits       Observation/Palpation  Observation: .  Toilet Transfers  Toilet - Technique: Stand step  Equipment Used: Extra wide bedside commode  Toilet Transfer: Contact guard assistance;Minimal assistance  ADL  Feeding: Supervision  Grooming: Supervision  UE Bathing: Supervision  LE Bathing: Minimal assistance  UE Dressing: Supervision  LE Dressing: Minimal assistance  Toileting: Contact guard assistance;Minimal assistance        Bed mobility  Supine to Sit: Stand by assistance  Bed Mobility Comments: supervision sitting EOB  Transfers  Stand Step Transfers: Contact guard assistance;Minimal assistance     Cognition  Overall Cognitive Status: WFL                 LUE AROM (degrees)  LUE AROM : WFL  RUE AROM (degrees)  RUE AROM : WFL                    Plan   Occupational Therapy Plan  Times Per Week: 3-5    Goals  Short Term Goals  Short Term Goal 1: Perform transfers with supervision  Short Term Goal 2: Perform dressing with supervision  Short Term Goal 3: Perform toileting with supervision  Short Term Goal 4: Perform light ambulatory ADL with supervision  Short Term Goal 5: Independent with initial therapeutic activity recommendations, AE/DME recommendations as appropriate  Long Term Goals  Long Term Goal 1: Upgrade to modified independent for ADL and mobility         Tx initiated:  Instructed in therapeutic activity

## 2024-09-04 NOTE — DISCHARGE INSTRUCTIONS
POSTOPERATIVE CAROTID ENDARTERECTOMY INSTRUCTIONS       1. The long wound has glue covering the wound.  So, it's OK to shower or have a tub bath.  Use Dial antibacterial soap to clean incision daily. Do not use Hibiclens after leaving the hospital.  No swimming for two weeks.  The smaller wound where the drain was removed should be covered with a bandaid daily for 3 days after shower or bath.  Do not use antibiotic ointment, salve, or cream.on incision line    2. You may expect the area to be bruised and swollen, but that will disappear.  You will also develop a hard “healing ridge” underneath the incision, which is part of the normal healing process.  This will also disappear    3. Men should use an electric razor for two weeks, or clip around the area of the incision, but not on the incision. Avoid straining, coughing or lifting over 10 pounds.    4. You should not drive for 1 week due to limited neck motion.  We don't want you to get in a car accident because you couldn't turn your neck to see traffic.  It's OK to ride in the car.    5. Sleep with your head elevated with 2 pillows for the next 4 to 5 days to help minimize swelling.    6. Due to contact with the nerves in the neck, you may have some numbness of the neck around the incision or jaw, and/or problems with swallowing and tongue coordination.  Most of these symptoms will go away in time.    7. Things to watch for include:  Staggering gait.  Numbness or tingling of the arms or legs, or facial droop.  Sudden loss of vision.  Stuttering or difficulty with speech.  Difficulty with swallowing or breathing.  (A sore throat and hoarseness is common after surgery).  Drainage, redness and warmth of incision, and/or fever.  Sudden massive enlargement of neck.    8. If you smoke, STOP … if you don’t smoke, don’t start.  Smoking makes the blood vessels smaller, increases plaque formation in the arteries, increases blood pressure.  Smoking increases the  today.    If you live outside of [facility], contact your local Senior Center or Area Agency on Aging for transportation assistance options specific to your county:     [facility] Agency on Aging and Independent Living   [place], [place], [place], [place], [place] and [place]  [phone]    [facility] Agency on Aging and Independent Living  [place], [place], [place], [place], [place], [place] and [place]  [phone]     Volunteer for Veterans by [employer]abled [employer]   https://www.amy.org/  [phone] ext. [phone]  [facility] offers a wide range of support for our nation’s heroes including transportation for veterans to and from their VA medical appointments.     Air Transportation for Medical & Humanitarian Needs by [employer]  https://[employer].org/patient-information/  [address], [address]  [place]  [address]  [phone]    [employer] coordinate free flights for passengers who need to travel a long distance to non-emergency medical treatment. Eligible clients would live in rural communities, be compromised in a way that commercial flight would not be advised or suitable, and have limited income to access air transportation otherwise.     [facility] [employer] [facility]  Any person that voluntarily requests help with substance addiction can be transported by a ’s deputy to a treatment center that is partnering with [employer]. The deputy will provide additional assistance in starting the program.  Call the [facility] Sheriff's Office at 000-790-2287 and ask for [employer].     [facility]               Safety Resources*     Emergency: 911    Non-Emergency Dispatch: [phone]    [facility] [place]   24 Hour Crisis Line: 1-677.950.6344 [phone]  [facility] [place] is committed to improving the lives of those affected by domestic violence: women, men, and children. We are the

## 2024-09-04 NOTE — PROGRESS NOTES
Patient:   Mary Lira  MR#:    [id number]   Room:    0426/426-02   YOB: 1975  Date of Progress Note: 9/4/2024  Time of Note                           7:27 AM  Consulting Physician:   Sandeep Diaz M.D.  Attending Physician:  Bashir Bhakta MD     Chief complaint Right-sided weakness/slurred speech/hypotension/hypoglycemia     S:This 48 y.o. female  with past medical history significant for atrial fibrillation not on anticoagulation, history of GI bleed, diabetes, CHF and hyperlipidemia is seen for evaluation of right-sided weakness and confusion.  On the day of admission the patient was last known well around 9 PM.  She went to sleep.  A few hours later the family awakened her when her Dexcom notified that her blood sugar was 30.  She was confused and disoriented when awoken.  She was found to have right-sided weakness and slurred speech.  She was brought in for evaluation.  She has been coughing for about 1 week along with some diarrhea.  She denies any previous history of stroke.  She is not on blood thinners.  CT of the head with no acute changes.  CT of the head and neck with no evidence of thrombus but with severe left carotid stenosis approximately approximately 80-90%.  She was transferred to the ICU and placed on pressors.  EKG with sinus rhythm.  Chest x-ray nodular opacities in the left upper lung.  Troponin borderline elevated.  Respiratory panel negative.  Serum creatinine elevated 1.2.  Discussions were held with ER physician approximately 4 hours and 15 minutes from time last known well.  Exam at that time revealed some drift in the right arm and right leg.  Doing better in terms of weakness on the right.  Does have a headache.  Otherwise doing well.    REVIEW OF SYSTEMS:  Constitutional: No fevers No chills  Neck:No stiffness  Respiratory: No shortness of breath  Cardiovascular: No chest pain No palpitations  Gastrointestinal: No abdominal pain    Genitourinary: No  tongue  Neck-symmetric, no masses noted, no jugular vein distension  Respiration- chest wall appears symmetric, good expansion,   normal effort without use of accessory muscles  Musculoskeletal - no significant wasting of muscles noted, no bony deformities  Extremities-no clubbing, cyanosis or edema  Skin - warm, dry, and intact. No rash, erythema, or pallor.  Psychiatric - mood, affect, and behavior appear normal.      Neurological exam  Awake, alert, fluent oriented appropriate affect  Attention and concentration appear appropriate  Recent and remote memory appears unremarkable  Speech normal without dysarthria  No clear issues with language of fund of knowledge     Cranial Nerve Exam     CN III, IV,VI-EOMI, No nystagmus, conjugate eye movements, no ptosis    CN VII-right lower facial weakness       Motor Exam  antigravity throughout upper and lower extremities bilaterally  Drift of the right arm more than right leg      Tremors- no tremors in hands or head noted     Gait  Not tested     Nursing/pcp notes, imaging,labs and vitals reviewed.     PT,OT and/or speech notes reviewed    Lab Results   Component Value Date    WBC 15.5 (H) 09/04/2024    HGB 11.3 (L) 09/04/2024    HCT 39.0 09/04/2024    MCV 83.7 09/04/2024     09/04/2024     Lab Results   Component Value Date     09/04/2024    K 5.7 (H) 09/04/2024     09/04/2024    CO2 24 09/04/2024    BUN 20 09/04/2024    CREATININE 0.8 09/04/2024    GLUCOSE 172 (H) 09/04/2024    CALCIUM 8.6 09/04/2024    BILITOT <0.2 09/03/2024    ALKPHOS 104 09/03/2024    AST 16 09/03/2024    ALT 7 09/03/2024    LABGLOM 89 09/04/2024     Lab Results   Component Value Date    INR 0.98 09/04/2024    INR 1.00 09/03/2024    PROTIME 12.7 09/04/2024    PROTIME 12.9 09/03/2024     MRI brain without contrast [[id number]]    Resulted: 09/03/24 1315    Updated: 09/03/24 1320    Narrative:     EXAM:  MRI OF THE BRAIN WITHOUT CONTRAST     HISTORY:  Right-sided weakness

## 2024-09-04 NOTE — CARE COORDINATION
Case Management Assessment  Initial Evaluation    Date/Time of Evaluation: 9/4/2024 7:57 AM  Assessment Completed by: SHANTEL Isaacs    If patient is discharged prior to next notation, then this note serves as note for discharge by case management.    Patient Name: Mary Lira                   YOB: 1975  Diagnosis: Carotid stenosis, left [I65.22]  Stroke-like symptoms [R29.90]  Pneumonia due to other specified bacteria (HCC) [J15.8]  Cerebrovascular accident (CVA), unspecified mechanism (HCC) [I63.9]  Leukocytosis, unspecified type [D72.829]  Pneumonia due to infectious organism, unspecified laterality, unspecified part of lung [J18.9]                   Date / Time: 9/3/2024 12:28 AM    Patient Admission Status: Inpatient   Readmission Risk (Low < 19, Mod (19-27), High > 27): Readmission Risk Score: 15.8    Current PCP: Bruno Manning MD  PCP verified by CM? Yes    Chart Reviewed: Yes      History Provided by: Patient  Patient Orientation: Alert and Oriented    Patient Cognition: Alert    Hospitalization in the last 30 days (Readmission):  No    If yes, Readmission Assessment in CM Navigator will be completed.    Advance Directives:      Code Status: Full Code   Patient's Primary Decision Maker is: Legal Next of Kin      Discharge Planning:    Patient lives with: Family Members Type of Home: House  Primary Care Giver: Self  Patient Support Systems include: Family Members, Children   Current Financial resources: Medicaid  Current community resources: None  Current services prior to admission: None            Current DME:              Type of Home Care services:  None    ADLS  Prior functional level: Independent in ADLs/IADLs  Current functional level: Independent in ADLs/IADLs    PT AM-PAC:   /24  OT AM-PAC:   /24    Family can provide assistance at DC: Yes  Would you like Case Management to discuss the discharge plan with any other family members/significant others, and if so, who? Yes

## 2024-09-04 NOTE — PROGRESS NOTES
Daily Progress Note    Date:2024  Patient: Mary Lira  : 1975  MRN:[id number]  CODE:Full Code No additional code details  PCP:Bruno Manning MD    Admit Date: 9/3/2024 12:28 AM   LOS: 1 day     Chief Complaint   Patient presents with    Seizures    Hypoglycemia           Altered Mental Status         Subjective: NAEON. Productive cough ongoing. Headache is worse today. Requiring 2 L NC.         Hospital Summary: 49 yo F with T2DM, CHF, HTN, ESTEVAN who presented to [facility] ED with AMS, slurred speech, and right sided weakness.  CT head negative for acute abnormalities.  Admitted to hospitalist service for further management.   MRI brain showed 2 small foci of acute infarction within left parietal and left frontal lobe.   CTA neck showed ~90% stenosis of left ICA. Vascular surgery was consulted and considering carotid intervention.  Cardiology consulted for preoperative assessment. Echo showed EF 55-60% without regional wall motion abnormalities, planning for Lexiscan tomorrow.    Pt reported productive cough and dyspnea. CXR showed left sided opacities/nodules, CT recommended for further evaluation. Pt started on empiric Azithromycin and Ceftriaxone.   CT chest showed possible pulmonary edema, left lower lobe pneumonia, and multiple left sided pulmonary nodules with the largest measuring 1.1 cm. Pt will need repeat CT chest in 3 months for surveillance.         Review of Systems   All other systems reviewed and are negative.      Objective:      Vital signs in last 24 hours:  Patient Vitals for the past 24 hrs:   BP Temp Temp src Pulse Resp SpO2 Weight   24 0856 -- -- -- -- 20 -- --   24 0838 (!) 148/76 97.3 °F (36.3 °C) -- (!) 122 20 -- --   24 0723 -- -- -- -- -- 98 % --   24 0448 (!) 156/65 97.5 °F (36.4 °C) -- (!) 113 18 99 % --   245 129/60 97.1 °F (36.2 °C) Temporal 93 18 96 % --   24 1927 -- -- -- -- -- -- 119 kg (262 lb 5.6 oz)   24 1735 (!) 143/74 97.1  %    Platelets 273 130 - 400 K/uL    MPV 10.6 9.4 - 12.3 fL    PLATELET SLIDE REVIEW Adequate     Neutrophils % 68.3 (H) 50.0 - 65.0 %    Lymphocytes % 18.7 (L) 20.0 - 40.0 %    Monocytes % 4.4 0.0 - 10.0 %    Eosinophils % 7.6 (H) 0.0 - 5.0 %    Basophils % 0.5 0.0 - 1.0 %    Neutrophils Absolute 10.6 (H) 1.5 - 7.5 K/uL    Immature Granulocytes # 0.1 K/uL    Lymphocytes Absolute 2.9 1.1 - 4.5 K/uL    Monocytes Absolute 0.70 0.00 - 0.90 K/uL    Eosinophils Absolute 1.20 (H) 0.00 - 0.60 K/uL    Basophils Absolute 0.10 0.00 - 0.20 K/uL   Protime-INR    Collection Time: 09/04/24  3:40 AM   Result Value Ref Range    Protime 12.7 12.0 - 14.6 sec    INR 0.98 0.88 - 1.18   APTT    Collection Time: 09/04/24  3:40 AM   Result Value Ref Range    aPTT 20.5 (L) 26.0 - 36.2 sec   POCT Glucose    Collection Time: 09/04/24  7:35 AM   Result Value Ref Range    POC Glucose 159 (H) 70 - 99 mg/dl    Performed on AccuChek    POCT Glucose    Collection Time: 09/04/24 11:46 AM   Result Value Ref Range    POC Glucose 156 (H) 70 - 99 mg/dl    Performed on AccuChek    Basic Metabolic Panel    Collection Time: 09/04/24 11:55 AM   Result Value Ref Range    Sodium 140 136 - 145 mmol/L    Potassium 5.5 (H) 3.5 - 5.0 mmol/L    Chloride 105 98 - 111 mmol/L    CO2 26 22 - 29 mmol/L    Anion Gap 9 7 - 19 mmol/L    Glucose 165 (H) 70 - 99 mg/dL    BUN 16 6 - 20 mg/dL    Creatinine 0.7 0.5 - 0.9 mg/dL    Est, Glom Filt Rate >90 >60    Calcium 8.7 8.6 - 10.0 mg/dL   D-Dimer, Quantitative    Collection Time: 09/04/24 11:55 AM   Result Value Ref Range    D-Dimer, Quant 0.33 0.00 - 0.48 ug/mL FEU             Current Facility-Administered Medications:     insulin glargine (LANTUS) injection vial 10 Units, 10 Units, SubCUTAneous, Daily, Bashir Bhakta MD, 10 Units at 09/04/24 0856    glucose chewable tablet 16 g, 4 tablet, Oral, PRN, Bashir Bhakta MD    dextrose bolus 10% 125 mL, 125 mL, IntraVENous, PRN **OR** dextrose bolus 10% 250 mL, 250 mL,

## 2024-09-04 NOTE — PLAN OF CARE
Problem: Pain  Goal: Verbalizes/displays adequate comfort level or baseline comfort level  Outcome: Progressing     Problem: Safety - Adult  Goal: Free from fall injury  Outcome: Progressing     Problem: Chronic Conditions and Co-morbidities  Goal: Patient's chronic conditions and co-morbidity symptoms are monitored and maintained or improved  Outcome: Progressing  Flowsheets (Taken 9/3/2024 4033 by Ellie Pete RN)  Care Plan - Patient's Chronic Conditions and Co-Morbidity Symptoms are Monitored and Maintained or Improved:   Monitor and assess patient's chronic conditions and comorbid symptoms for stability, deterioration, or improvement   Collaborate with multidisciplinary team to address chronic and comorbid conditions and prevent exacerbation or deterioration   Update acute care plan with appropriate goals if chronic or comorbid symptoms are exacerbated and prevent overall improvement and discharge

## 2024-09-04 NOTE — CONSULTS
Vascular Surgery Consultation    I was consulted to see the patient for carotid stenosis.    HPI    Mary Lira is a 47 yo  woman with history of cardiac disease, diabetes mellitus type II, obesity, who had difficulty with speech and right arm worse than leg weakness, confusion. CTA neck and brain showed severe left carotid stenosis. MRI brain showed left hemispheric small ischemic infarcts.     Her speech is better, much less confusion, and right arm and leg weakness improving.    Current Medical History    Mary Lira is a 48 y.o. female with the following history reviewed and recorded in [facility]:  Patient Active Problem List    Diagnosis Date Noted    Pneumonia due to other specified bacteria ([facility]) 09/03/2024    Acute ischemic stroke ([facility]) 09/03/2024    Hypotension 09/03/2024    Diarrhea 09/03/2024    Diabetes ([facility]) 09/03/2024    Essential hypertension 09/03/2024    Hemiplegia affecting dominant side ([facility]) 09/03/2024    Stenosis of left carotid artery 09/03/2024    Hypertension     Hyperlipidemia      Current Facility-Administered Medications   Medication Dose Route Frequency Provider Last Rate Last Admin    insulin glargine (LANTUS) injection vial 10 Units  10 Units SubCUTAneous Daily Bashir Bhakta MD   10 Units at 09/04/24 0856    glucose chewable tablet 16 g  4 tablet Oral PRN Bashir Bhakta MD        dextrose bolus 10% 125 mL  125 mL IntraVENous PRN Bashir Bhakta MD        Or    dextrose bolus 10% 250 mL  250 mL IntraVENous PRN Bashir Bhakta MD        glucagon injection 1 mg  1 mg SubCUTAneous PRN Bashir Bhakta MD        dextrose 10 % infusion   IntraVENous Continuous PRN Bashir Bhakta MD        butalbital-acetaminophen-caffeine (FIORICET, ESGIC) per tablet 1 tablet  1 tablet Oral Q6H PRN Bashir Bhakta MD        sodium chloride flush 0.9 % injection 5-40 mL  5-40 mL IntraVENous 2 times per day Howie Matute MD        sodium chloride flush 0.9 % injection 5-40 mL  5-40 mL

## 2024-09-04 NOTE — CONSULTS
[facility] Vascular Access Team:  Consult Note    Patient: Mary Lira  YOB: 1975   MRN: [id number]  Room: [address] [place] [address]     Attending Physician: Bashir Bhakta MD  Ordering Physician: Bashir Bhakta MD    Diagnosis:   Carotid stenosis, left [I65.22]  Stroke-like symptoms [R29.90]  Pneumonia due to other specified bacteria (HCC) [J15.8]  Cerebrovascular accident (CVA), unspecified mechanism (HCC) [I63.9]  Leukocytosis, unspecified type [D72.829]  Pneumonia due to infectious organism, unspecified laterality, unspecified part of lung [J18.9]    Active LDAs:  Peripheral IV 09/04/24 Left;Anterior Forearm (Active)   Number of days: 0       Reason for Consult:  [facility] vascular access team consulted for placement of PICC/Midline.    Indication(s):  Mary Lira is a 48 y.o. female admitted to [address] [place] [address] for Pneumonia due to other specified bacteria (HCC). Mary Lira currently has one PIV in her right antecubital that has become painful and needs removal. Initial consult was for PICC line placement as patient was in ICU with Levophed. However, patient has improved and is now on the medical floor not requiring any vesicant/irritant medications.     Findings:  Following MAGIC guidelines, found an ultrasound guided peripheral IV to be more appropriate than a PICC line as the risk of infection and complications with a PICC outweighs benefit. Explained this to the patient who verbalized understanding.     Successfully placed a 20 gauge 1.75\" ultrasound guided peripheral IV in the patient's left forearm in a large vein with one attempt and no complications. Patient tolerated well. Removed old PIV from the right antecubital without issue. Notified primary RN.     Impression/Plan:  1. Ultrasound-Guided Peripheral IV is ready to be used    Thank you for your time and consult.     Electronically Signed By: Nikhil Kyle RN on 9/4/2024 at 2:55 PM

## 2024-09-04 NOTE — CONSULTS
[facility]  Cardiology Consult      Requesting MD:  Bashir Bhakta MD   Admit Status:         History obtained from:   [] Patient  [] Other (specify):     Patient:  Mary Lira  [id number]     Chief Complaint:   Chief Complaint   Patient presents with    Seizures    Hypoglycemia           Altered Mental Status       HPI: Ms. Lira is a 48 y.o. female admitted 9/3/2020 for for strokelike symptoms.  History of diabetes congestive heart failure hypertension hyperlipidemia anxiety and obstructive sleep apnea as well as obesity.  Found to have right-sided weakness with slurred speech also headache shortness of breath cough nausea and diarrhea.  CTA of the head and neck reported 90% stenosis left ICA 60% right ICA.  MRI of the brain showed 2 small foci of acute cerebral infarction cortex left parietal lobe and within the deep white matter of the left frontal lobe mild chronic small vessel ischemic changes seen within the supratentorial white matter.  Possible carotid endarterectomy being contemplated cardiology consulted for preoperative assessment.  Patient does report she gets very out of breath after walking a short distance can walk perhaps 1 block at the most.  She also does describe some exertional discomfort burning and pressure.  Has never had invasive assessment she thinks she may have had a stress test many years ago.  Troponins were 35 and 27.  Echocardiogram revealed ejection fraction 55 to 60% normal wall motion mild mitral stenosis mean gradient 4 no history of rheumatic fever.  No significant valvular disease otherwise.    Review of Systems:  Review of Systems   Constitutional: Negative.  Negative for chills, fever and unexpected weight change.   HENT: Negative.     Eyes: Negative.    Respiratory: Negative.  Negative for shortness of breath.    Cardiovascular: Negative.  Negative for chest pain.   Gastrointestinal: Negative.  Negative for diarrhea, nausea and vomiting.  sounds. No stridor. No wheezing, rhonchi or rales.   Chest:      Chest wall: No tenderness.   Abdominal:      General: Abdomen is flat. Bowel sounds are normal. There is no distension.      Palpations: Abdomen is soft. There is no mass.      Tenderness: There is no abdominal tenderness. There is no right CVA tenderness, left CVA tenderness, guarding or rebound.      Hernia: No hernia is present.   Musculoskeletal:         General: No swelling, tenderness, deformity or signs of injury.      Cervical back: Normal range of motion and neck supple. No rigidity or tenderness.      Right lower leg: No edema.      Left lower leg: No edema.   Lymphadenopathy:      Cervical: No cervical adenopathy.   Skin:     General: Skin is warm and dry.   Neurological:      General: No focal deficit present.      Mental Status: She is alert and oriented to person, place, and time. Mental status is at baseline.      Cranial Nerves: No cranial nerve deficit.      Sensory: No sensory deficit.      Motor: No weakness.      Coordination: Coordination normal.   Psychiatric:         Mood and Affect: Mood normal.         Behavior: Behavior normal.         Thought Content: Thought content normal.         Judgment: Judgment normal.         Labs:  Recent Labs     09/03/24  0035 09/04/24  0340   WBC 20.7* 15.5*   HGB 10.7* 11.3*   * 273       Recent Labs     09/03/24  0035 09/04/24  0340 09/04/24  1155    141 140   K 4.7 5.7* 5.5*    106 105   CO2 27 24 26   BUN 21* 20 16   CREATININE 1.2* 0.8 0.7   LABGLOM 55* 89 >90   CALCIUM 8.3* 8.6 8.7       CK, CKMB, Troponin: @LABRCNT (CKTOTAL:3, CKMB:3, TROPONINI:3)@    Last 3 BNP:  No results for input(s): \"BNP\" in the last 72 hours.        IMAGING:  CT CHEST WO CONTRAST    Result Date: 9/4/2024  EXAM: CT CHEST WITHOUT CONTRAST  HISTORY: Left upper lobe nodule  TECHNIQUE: CT acquisition of the chest without IV contrast administration.  CT dose reduction techniques performed: Yes.  Coronal

## 2024-09-05 ENCOUNTER — APPOINTMENT (OUTPATIENT)
Dept: NUCLEAR MEDICINE | Age: 49
End: 2024-09-05
Payer: MEDICAID

## 2024-09-05 LAB
ANION GAP SERPL CALCULATED.3IONS-SCNC: 9 MMOL/L (ref 7–19)
BASOPHILS # BLD: 0.1 K/UL (ref 0–0.2)
BASOPHILS NFR BLD: 0.7 % (ref 0–1)
BUN SERPL-MCNC: 18 MG/DL (ref 6–20)
CALCIUM SERPL-MCNC: 8.6 MG/DL (ref 8.6–10)
CHLORIDE SERPL-SCNC: 102 MMOL/L (ref 98–111)
CO2 SERPL-SCNC: 25 MMOL/L (ref 22–29)
CREAT SERPL-MCNC: 0.9 MG/DL (ref 0.5–0.9)
EKG P AXIS: NORMAL DEGREES
EKG P-R INTERVAL: NORMAL MS
EKG Q-T INTERVAL: 294 MS
EKG QRS DURATION: 90 MS
EKG QTC CALCULATION (BAZETT): 414 MS
EKG T AXIS: 49 DEGREES
EOSINOPHIL # BLD: 1.2 K/UL (ref 0–0.6)
EOSINOPHIL NFR BLD: 8.9 % (ref 0–5)
ERYTHROCYTE [DISTWIDTH] IN BLOOD BY AUTOMATED COUNT: 14.7 % (ref 11.5–14.5)
GLUCOSE BLD-MCNC: 139 MG/DL (ref 70–99)
GLUCOSE BLD-MCNC: 169 MG/DL (ref 70–99)
GLUCOSE BLD-MCNC: 197 MG/DL (ref 70–99)
GLUCOSE BLD-MCNC: 246 MG/DL (ref 70–99)
GLUCOSE SERPL-MCNC: 180 MG/DL (ref 70–99)
HCT VFR BLD AUTO: 37.6 % (ref 37–47)
HGB BLD-MCNC: 10.9 G/DL (ref 12–16)
IMM GRANULOCYTES # BLD: 0.1 K/UL
LYMPHOCYTES # BLD: 2.8 K/UL (ref 1.1–4.5)
LYMPHOCYTES NFR BLD: 20.4 % (ref 20–40)
MCH RBC QN AUTO: 24 PG (ref 27–31)
MCHC RBC AUTO-ENTMCNC: 29 G/DL (ref 33–37)
MCV RBC AUTO: 82.8 FL (ref 81–99)
MONOCYTES # BLD: 0.7 K/UL (ref 0–0.9)
MONOCYTES NFR BLD: 5.2 % (ref 0–10)
NEUTROPHILS # BLD: 8.9 K/UL (ref 1.5–7.5)
NEUTS SEG NFR BLD: 64.4 % (ref 50–65)
NUC STRESS EJECTION FRACTION: 73 %
PERFORMED ON: ABNORMAL
PLATELET # BLD AUTO: 324 K/UL (ref 130–400)
PMV BLD AUTO: 9.9 FL (ref 9.4–12.3)
POTASSIUM SERPL-SCNC: 5.2 MMOL/L (ref 3.5–5)
RBC # BLD AUTO: 4.54 M/UL (ref 4.2–5.4)
SODIUM SERPL-SCNC: 136 MMOL/L (ref 136–145)
STRESS BASELINE DIAS BP: 78 MMHG
STRESS BASELINE HR: 101 BPM
STRESS BASELINE SYS BP: 143 MMHG
STRESS EXERCISE DUR MIN: 5 MIN
STRESS EXERCISE DUR SEC: 0 SEC
STRESS PEAK DIAS BP: 80 MMHG
STRESS PEAK SYS BP: 164 MMHG
STRESS PERCENT HR ACHIEVED: 65 %
STRESS POST PEAK HR: 110 BPM
STRESS RATE PRESSURE PRODUCT: NORMAL BPM*MMHG
STRESS STAGE 1 BP: NORMAL MMHG
STRESS STAGE 1 DURATION: 1 MIN:SEC
STRESS STAGE 1 HR: 108 BPM
STRESS STAGE 2 BP: NORMAL MMHG
STRESS STAGE 2 DURATION: 1 MIN:SEC
STRESS STAGE 2 HR: 110 BPM
STRESS STAGE 3 BP: NORMAL MMHG
STRESS STAGE 3 DURATION: 1 MIN:SEC
STRESS STAGE 3 HR: 108 BPM
STRESS STAGE 4 BP: NORMAL MMHG
STRESS STAGE 4 DURATION: 1 MIN:SEC
STRESS STAGE 4 HR: 108 BPM
STRESS STAGE 5 BP: NORMAL MMHG
STRESS STAGE 5 DURATION: 1 MIN:SEC
STRESS STAGE 5 HR: 108 BPM
STRESS STAGE RECOVERY 1 BP: NORMAL MMHG
STRESS STAGE RECOVERY 1 DURATION: 1 MIN:SEC
STRESS STAGE RECOVERY 1 HR: 107 BPM
STRESS TARGET HR: 170 BPM
WBC # BLD AUTO: 13.8 K/UL (ref 4.8–10.8)

## 2024-09-05 PROCEDURE — 93016 CV STRESS TEST SUPVJ ONLY: CPT | Performed by: INTERNAL MEDICINE

## 2024-09-05 PROCEDURE — 82962 GLUCOSE BLOOD TEST: CPT

## 2024-09-05 PROCEDURE — 6370000000 HC RX 637 (ALT 250 FOR IP): Performed by: PSYCHIATRY & NEUROLOGY

## 2024-09-05 PROCEDURE — 78452 HT MUSCLE IMAGE SPECT MULT: CPT | Performed by: INTERNAL MEDICINE

## 2024-09-05 PROCEDURE — A9502 TC99M TETROFOSMIN: HCPCS | Performed by: INTERNAL MEDICINE

## 2024-09-05 PROCEDURE — 36415 COLL VENOUS BLD VENIPUNCTURE: CPT

## 2024-09-05 PROCEDURE — 99233 SBSQ HOSP IP/OBS HIGH 50: CPT | Performed by: PSYCHIATRY & NEUROLOGY

## 2024-09-05 PROCEDURE — 94760 N-INVAS EAR/PLS OXIMETRY 1: CPT

## 2024-09-05 PROCEDURE — 93018 CV STRESS TEST I&R ONLY: CPT | Performed by: INTERNAL MEDICINE

## 2024-09-05 PROCEDURE — 78452 HT MUSCLE IMAGE SPECT MULT: CPT

## 2024-09-05 PROCEDURE — 2700000000 HC OXYGEN THERAPY PER DAY

## 2024-09-05 PROCEDURE — 6370000000 HC RX 637 (ALT 250 FOR IP): Performed by: STUDENT IN AN ORGANIZED HEALTH CARE EDUCATION/TRAINING PROGRAM

## 2024-09-05 PROCEDURE — 3430000000 HC RX DIAGNOSTIC RADIOPHARMACEUTICAL: Performed by: INTERNAL MEDICINE

## 2024-09-05 PROCEDURE — 94640 AIRWAY INHALATION TREATMENT: CPT

## 2024-09-05 PROCEDURE — 1200000000 HC SEMI PRIVATE

## 2024-09-05 PROCEDURE — 6360000002 HC RX W HCPCS: Performed by: STUDENT IN AN ORGANIZED HEALTH CARE EDUCATION/TRAINING PROGRAM

## 2024-09-05 PROCEDURE — 85025 COMPLETE CBC W/AUTO DIFF WBC: CPT

## 2024-09-05 PROCEDURE — 93010 ELECTROCARDIOGRAM REPORT: CPT | Performed by: INTERNAL MEDICINE

## 2024-09-05 PROCEDURE — 2580000003 HC RX 258: Performed by: STUDENT IN AN ORGANIZED HEALTH CARE EDUCATION/TRAINING PROGRAM

## 2024-09-05 PROCEDURE — 80048 BASIC METABOLIC PNL TOTAL CA: CPT

## 2024-09-05 PROCEDURE — 6360000002 HC RX W HCPCS: Performed by: INTERNAL MEDICINE

## 2024-09-05 PROCEDURE — 93017 CV STRESS TEST TRACING ONLY: CPT

## 2024-09-05 RX ORDER — ENOXAPARIN SODIUM 150 MG/ML
1 INJECTION SUBCUTANEOUS 2 TIMES DAILY
Status: DISCONTINUED | OUTPATIENT
Start: 2024-09-05 | End: 2024-09-06

## 2024-09-05 RX ORDER — GABAPENTIN 300 MG/1
300 CAPSULE ORAL 3 TIMES DAILY
Status: DISCONTINUED | OUTPATIENT
Start: 2024-09-05 | End: 2024-09-12 | Stop reason: HOSPADM

## 2024-09-05 RX ORDER — METOPROLOL SUCCINATE 25 MG/1
25 TABLET, EXTENDED RELEASE ORAL DAILY
Status: DISCONTINUED | OUTPATIENT
Start: 2024-09-05 | End: 2024-09-11

## 2024-09-05 RX ORDER — REGADENOSON 0.08 MG/ML
0.4 INJECTION, SOLUTION INTRAVENOUS
Status: COMPLETED | OUTPATIENT
Start: 2024-09-05 | End: 2024-09-05

## 2024-09-05 RX ADMIN — TETROFOSMIN 8 MILLICURIE: 0.23 INJECTION, POWDER, LYOPHILIZED, FOR SOLUTION INTRAVENOUS at 11:23

## 2024-09-05 RX ADMIN — HYDROCODONE BITARTRATE AND ACETAMINOPHEN 1 TABLET: 7.5; 325 TABLET ORAL at 19:39

## 2024-09-05 RX ADMIN — SERTRALINE HYDROCHLORIDE 100 MG: 100 TABLET ORAL at 10:07

## 2024-09-05 RX ADMIN — METOPROLOL SUCCINATE 25 MG: 25 TABLET, EXTENDED RELEASE ORAL at 10:07

## 2024-09-05 RX ADMIN — ENOXAPARIN SODIUM 120 MG: 150 INJECTION SUBCUTANEOUS at 21:20

## 2024-09-05 RX ADMIN — IPRATROPIUM BROMIDE AND ALBUTEROL SULFATE 1 DOSE: 2.5; .5 SOLUTION RESPIRATORY (INHALATION) at 07:59

## 2024-09-05 RX ADMIN — DILTIAZEM HYDROCHLORIDE 300 MG: 180 CAPSULE, COATED, EXTENDED RELEASE ORAL at 10:06

## 2024-09-05 RX ADMIN — BUSPIRONE HYDROCHLORIDE 5 MG: 5 TABLET ORAL at 10:06

## 2024-09-05 RX ADMIN — ASPIRIN 81 MG CHEWABLE TABLET 81 MG: 81 TABLET CHEWABLE at 10:07

## 2024-09-05 RX ADMIN — TIZANIDINE 4 MG: 4 TABLET ORAL at 11:52

## 2024-09-05 RX ADMIN — IPRATROPIUM BROMIDE AND ALBUTEROL SULFATE 1 DOSE: 2.5; .5 SOLUTION RESPIRATORY (INHALATION) at 19:30

## 2024-09-05 RX ADMIN — SODIUM CHLORIDE, PRESERVATIVE FREE 10 ML: 5 INJECTION INTRAVENOUS at 10:10

## 2024-09-05 RX ADMIN — AZITHROMYCIN MONOHYDRATE 500 MG: 500 INJECTION, POWDER, LYOPHILIZED, FOR SOLUTION INTRAVENOUS at 00:03

## 2024-09-05 RX ADMIN — IPRATROPIUM BROMIDE AND ALBUTEROL SULFATE 1 DOSE: 2.5; .5 SOLUTION RESPIRATORY (INHALATION) at 11:04

## 2024-09-05 RX ADMIN — SODIUM CHLORIDE, PRESERVATIVE FREE 10 ML: 5 INJECTION INTRAVENOUS at 21:20

## 2024-09-05 RX ADMIN — TIZANIDINE 4 MG: 4 TABLET ORAL at 21:16

## 2024-09-05 RX ADMIN — GABAPENTIN 300 MG: 300 CAPSULE ORAL at 11:52

## 2024-09-05 RX ADMIN — TRAZODONE HYDROCHLORIDE 100 MG: 100 TABLET ORAL at 21:14

## 2024-09-05 RX ADMIN — BUTALBITAL, ACETAMINOPHEN AND CAFFEINE 1 TABLET: 325; 50; 40 TABLET ORAL at 10:07

## 2024-09-05 RX ADMIN — ENOXAPARIN SODIUM 30 MG: 100 INJECTION SUBCUTANEOUS at 10:07

## 2024-09-05 RX ADMIN — TETROFOSMIN 24 MILLICURIE: 0.23 INJECTION, POWDER, LYOPHILIZED, FOR SOLUTION INTRAVENOUS at 11:22

## 2024-09-05 RX ADMIN — GABAPENTIN 300 MG: 300 CAPSULE ORAL at 21:14

## 2024-09-05 RX ADMIN — HYDROCODONE BITARTRATE AND ACETAMINOPHEN 1 TABLET: 7.5; 325 TABLET ORAL at 11:27

## 2024-09-05 RX ADMIN — ATORVASTATIN CALCIUM 40 MG: 40 TABLET, FILM COATED ORAL at 21:14

## 2024-09-05 RX ADMIN — REGADENOSON 0.4 MG: 0.08 INJECTION, SOLUTION INTRAVENOUS at 09:14

## 2024-09-05 RX ADMIN — IPRATROPIUM BROMIDE AND ALBUTEROL SULFATE 1 DOSE: 2.5; .5 SOLUTION RESPIRATORY (INHALATION) at 14:50

## 2024-09-05 RX ADMIN — CLOPIDOGREL BISULFATE 75 MG: 75 TABLET ORAL at 10:06

## 2024-09-05 ASSESSMENT — PAIN SCALES - GENERAL
PAINLEVEL_OUTOF10: 8
PAINLEVEL_OUTOF10: 10
PAINLEVEL_OUTOF10: 10

## 2024-09-05 ASSESSMENT — PAIN DESCRIPTION - LOCATION
LOCATION: HIP
LOCATION: HIP

## 2024-09-05 ASSESSMENT — PAIN DESCRIPTION - ORIENTATION
ORIENTATION: RIGHT
ORIENTATION: RIGHT

## 2024-09-05 ASSESSMENT — PAIN DESCRIPTION - DESCRIPTORS
DESCRIPTORS: ACHING
DESCRIPTORS: SPASM

## 2024-09-05 NOTE — PLAN OF CARE
SUBJECTIVE:    Contacted for A Fib with RVR earlier in night   Day team gave 5mg metoprolol IV without effect  This was repeated by me  As there was no change, and she is normally on 25mg tartrate BID as per report (effectively 1/4 the equipotency of this IV dosing altogether)    Of note did have hypo=magnesemia which is being replaced      OBJECTIVE:    BP (!) 140/105   Pulse (!) 128   Temp 97.3 °F (36.3 °C) (Temporal)   Resp 15   Ht 1.6 m (5' 3\")   Wt 119 kg (262 lb 5.6 oz)   SpO2 98%   BMI 46.47 kg/m²       ASSESSMENTS & PLANS:    A Fib with RVR:  Metoprolol IV 5mg x 2 doses already  Transfer to PCU - UPDATE: never transferred as Cardizem GGT was Not needed, after initial Diltiazem bolus rate was controlled and then converted to NSR overnight  Tele already on  Cadrizem bolus with infusion, if needed (I.e. if rate not controlled (<=110bpm) s/p push)    Hypomagnesemia: Mg 1.5  2g mag sulfate for now  Mag protocol for later  Repeat Mag for AM ordered

## 2024-09-05 NOTE — PROGRESS NOTES
Patient:   Mary Lira  MR#:    [id number]   Room:    0426/426-02   YOB: 1975  Date of Progress Note: 9/5/2024  Time of Note                           7:54 AM  Consulting Physician:   Sandeep Diaz M.D.  Attending Physician:  Bashir Bhakta MD     Chief complaint Right-sided weakness/slurred speech/hypotension/hypoglycemia     S:This 48 y.o. female  with past medical history significant for atrial fibrillation not on anticoagulation, history of GI bleed, diabetes, CHF and hyperlipidemia is seen for evaluation of right-sided weakness and confusion.  On the day of admission the patient was last known well around 9 PM.  She went to sleep.  A few hours later the family awakened her when her Dexcom notified that her blood sugar was 30.  She was confused and disoriented when awoken.  She was found to have right-sided weakness and slurred speech.  She was brought in for evaluation.  She has been coughing for about 1 week along with some diarrhea.  She denies any previous history of stroke.  She is not on blood thinners.  CT of the head with no acute changes.  CT of the head and neck with no evidence of thrombus but with severe left carotid stenosis approximately approximately 80-90%.  She was transferred to the ICU and placed on pressors.  EKG with sinus rhythm.  Chest x-ray nodular opacities in the left upper lung.  Troponin borderline elevated.  Respiratory panel negative.  Serum creatinine elevated 1.2.  Discussions were held with ER physician approximately 4 hours and 15 minutes from time last known well.  Exam at that time revealed some drift in the right arm and right leg.  Movement improved on the right.  Feeling well this morning.  Atrial fibrillation last night converted to normal sinus.      REVIEW OF SYSTEMS:  Constitutional: No fevers No chills  Neck:No stiffness  Respiratory: No shortness of breath  Cardiovascular: No chest pain No palpitations  Gastrointestinal: No abdominal pain

## 2024-09-05 NOTE — PLAN OF CARE
Problem: Discharge Planning  Goal: Discharge to home or other facility with appropriate resources  9/5/2024 0146 by Celestino Russell RN  Outcome: Progressing  9/4/2024 1613 by Ventura Perez LPN  Outcome: Progressing     Problem: Pain  Goal: Verbalizes/displays adequate comfort level or baseline comfort level  9/5/2024 0146 by Celestino Russell RN  Outcome: Progressing  9/4/2024 1613 by Ventura Perez LPN  Outcome: Progressing     Problem: Safety - Adult  Goal: Free from fall injury  9/5/2024 0146 by Celestino Russell RN  Outcome: Progressing  9/4/2024 1613 by Ventura Perez LPN  Outcome: Progressing     Problem: Skin/Tissue Integrity  Goal: Absence of new skin breakdown  Description: 1.  Monitor for areas of redness and/or skin breakdown  2.  Assess vascular access sites hourly  3.  Every 4-6 hours minimum:  Change oxygen saturation probe site  4.  Every 4-6 hours:  If on nasal continuous positive airway pressure, respiratory therapy assess nares and determine need for appliance change or resting period.  9/5/2024 0146 by Celestino Russell RN  Outcome: Progressing  9/4/2024 1613 by Ventura Perez LPN  Outcome: Progressing     Problem: Chronic Conditions and Co-morbidities  Goal: Patient's chronic conditions and co-morbidity symptoms are monitored and maintained or improved  9/5/2024 0146 by Celestino Russell RN  Outcome: Progressing  9/4/2024 1613 by Ventura Perez LPN  Outcome: Progressing

## 2024-09-05 NOTE — PROGRESS NOTES
\    DIS Nurse Note  SUBJECTIVE: Patient assessed secondary to elevated Deterioration Index Score.      Deterioration Index Score:  Predictive Model Details          53 (Warning)  Factor Value    Calculated 9/5/2024 11:36 21% Supplemental oxygen Nasal cannula    Deterioration Index Model 17% Neurological exam X     15% Age 48 years old     10% Respiratory rate 20     10% Potassium abnormal (5.2 mmol/L)     10% Systolic 177     8% Pulse 110     6% WBC count abnormal (13.8 K/uL)     2% Sodium 136 mmol/L     0% Pulse oximetry 98 %     0% Temperature 97.9 °F (36.6 °C)     0% Hematocrit 37.6 %        Vital Signs:  Vitals:    09/05/24 0800 09/05/24 0809 09/05/24 1000 09/05/24 1122   BP:  (!) 162/96 (!) 146/80 (!) 177/103   Pulse:  98 (!) 105 (!) 110   Resp:  20  20   Temp:  97.7 °F (36.5 °C)  97.9 °F (36.6 °C)   TempSrc:  Temporal  Temporal   SpO2: 96% 100%  98%   Weight:       Height:            Provider Notified: Reviewed patient status  & DIS score with Provider Dr. Bhakta     ASSESSMENT:   patient is in pain, elevated BP.     Plan of Care: administered pain medicine, will recheck bp. Rn notified provider face to face. Provider ordering other medicines.     Electronically signed by Estephania TOUSSAINT RN

## 2024-09-05 NOTE — PROGRESS NOTES
Daily Progress Note    Date:2024  Patient: Mary Lira  : 1975  MRN:[id number]  CODE:Full Code No additional code details  PCP:Bruno Manning MD    Admit Date: 9/3/2024 12:28 AM   LOS: 2 days     Chief Complaint   Patient presents with    Seizures    Hypoglycemia           Altered Mental Status         Subjective: Afib with RVR overnight requiring IV Metoprolol x2 and IV cardizem bolus. Pt converted to NSR early this morning.  Productive cough ongoing but somewhat improved.  Went for Lexiscan which she tolerated without chest pain or dyspnea.         Hospital Summary: 49 yo F with T2DM, CHF, HTN, ESTEVAN who presented to [facility] ED with AMS, slurred speech, and right sided weakness.  CT head negative for acute abnormalities.  Admitted to hospitalist service for further management.   MRI brain showed 2 small foci of acute infarction within left parietal and left frontal lobe.   CTA neck showed ~90% stenosis of left ICA. Vascular surgery was consulted and considering carotid intervention.  Cardiology consulted for preoperative assessment. Echo showed EF 55-60% without regional wall motion abnormalities, planning for Lexiscan tomorrow.    Pt reported productive cough and dyspnea. CXR showed left sided opacities/nodules, CT recommended for further evaluation. Pt started on empiric Azithromycin and Ceftriaxone.   CT chest showed possible pulmonary edema, left lower lobe pneumonia, and multiple left sided pulmonary nodules with the largest measuring 1.1 cm. Pt will need repeat CT chest in 3 months for surveillance.         Review of Systems   All other systems reviewed and are negative.      Objective:      Vital signs in last 24 hours:  Patient Vitals for the past 24 hrs:   BP Temp Temp src Pulse Resp SpO2   24 1644 118/63 97.3 °F (36.3 °C) Temporal 83 16 95 %   24 1218 (!) 150/90 -- -- (!) 110 -- --   24 1122 (!) 177/103 97.9 °F (36.6 °C) Temporal (!) 110 20 98 %   24 1000 (!) 146/80 --  the deep white matter of the left frontal lobe.  Mild chronic small vessel ischemic changes seen within the supratentorial white matter.  Critical result:  The findings were communicated to the patients nurse at 1:14 p.m. 09/03/2024.   ______________________________________ Electronically signed by: AALIYAH ORR M.D. Date:     09/03/2024 Time:    13:07     CT Brain Perfusion    Result Date: 9/3/2024   Elevated mean transit time in the left MCA territory corresponding to ischemia.  No core infarct.  Recommend consultation with neuro interventional service for possible catheter angiography.  Critical findings discussed with  ICU Nurse Vilma by Dr. Hale via telephone on 7:45 a.m. on 09/03/2024. Vilma indicated that neurology service is aware of these findings.  All CT scans are performed using dose optimization techniques as appropriate to the performed exam and include at least one of the following: Automated exposure control, adjustment of the mA and/or kV according to size, and the use of iterative reconstruction technique.  ______________________________________ Electronically signed by: ALEX HALE D.O. Date:     09/03/2024 Time:    07:37     XR CHEST PORTABLE    Result Date: 9/3/2024   Nodule like opacities in the left upper lung.  Recommend follow-up chest CT.  Calcified atherosclerosis.     ______________________________________ Electronically signed by: ARNOLD HARRISON M.D. Date:     09/03/2024 Time:    01:52     CTA HEAD NECK W CONTRAST    Result Date: 9/3/2024   No large vessel occlusion.  Heavy atherosclerotic plaque at the carotid bifurcations.   80 - 90% stenosis of the proximal left internal carotid artery.  60% stenosis of the proximal right internal carotid artery.  Left upper lobe nodules measuring 1.3 and 1.1 cm.  Recommend further evaluation with chest CT  All CT scans are performed using dose optimization techniques as appropriate to the performed exam and include at least one of the following:

## 2024-09-05 NOTE — PROGRESS NOTES
Messaged Dr. Palencia with Vascular to let him know the patient has been cleared by Cardiology for vascular procedures. Message received. Will plan for procedure tomorrow or Saturday 9/7.

## 2024-09-05 NOTE — PROGRESS NOTES
Cardiology Lexiscan ejection fraction 73% normal perfusion study no evidence of ischemia.  May proceed with vascular surgery as otherwise indicated.

## 2024-09-06 ENCOUNTER — APPOINTMENT (OUTPATIENT)
Dept: INTERVENTIONAL RADIOLOGY/VASCULAR | Age: 49
End: 2024-09-06
Payer: MEDICAID

## 2024-09-06 ENCOUNTER — ANESTHESIA EVENT (OUTPATIENT)
Dept: OPERATING ROOM | Age: 49
End: 2024-09-06
Payer: MEDICAID

## 2024-09-06 ENCOUNTER — ANESTHESIA (OUTPATIENT)
Dept: OPERATING ROOM | Age: 49
End: 2024-09-06
Payer: MEDICAID

## 2024-09-06 LAB
ABO/RH: NORMAL
ANION GAP SERPL CALCULATED.3IONS-SCNC: 10 MMOL/L (ref 7–19)
ANTIBODY SCREEN: NORMAL
BASOPHILS # BLD: 0.1 K/UL (ref 0–0.2)
BASOPHILS NFR BLD: 0.8 % (ref 0–1)
BUN SERPL-MCNC: 17 MG/DL (ref 6–20)
CALCIUM SERPL-MCNC: 8.5 MG/DL (ref 8.6–10)
CHLORIDE SERPL-SCNC: 101 MMOL/L (ref 98–111)
CO2 SERPL-SCNC: 25 MMOL/L (ref 22–29)
CREAT SERPL-MCNC: 0.7 MG/DL (ref 0.5–0.9)
EOSINOPHIL # BLD: 1.1 K/UL (ref 0–0.6)
EOSINOPHIL NFR BLD: 8.8 % (ref 0–5)
ERYTHROCYTE [DISTWIDTH] IN BLOOD BY AUTOMATED COUNT: 14.7 % (ref 11.5–14.5)
GLUCOSE BLD-MCNC: 157 MG/DL (ref 70–99)
GLUCOSE BLD-MCNC: 165 MG/DL (ref 70–99)
GLUCOSE BLD-MCNC: 253 MG/DL (ref 70–99)
GLUCOSE BLD-MCNC: 279 MG/DL (ref 70–99)
GLUCOSE BLD-MCNC: 283 MG/DL (ref 70–99)
GLUCOSE SERPL-MCNC: 184 MG/DL (ref 70–99)
HCT VFR BLD AUTO: 34.8 % (ref 37–47)
HGB BLD-MCNC: 10.3 G/DL (ref 12–16)
IMM GRANULOCYTES # BLD: 0 K/UL
LYMPHOCYTES # BLD: 3 K/UL (ref 1.1–4.5)
LYMPHOCYTES NFR BLD: 24.4 % (ref 20–40)
MCH RBC QN AUTO: 25.1 PG (ref 27–31)
MCHC RBC AUTO-ENTMCNC: 29.6 G/DL (ref 33–37)
MCV RBC AUTO: 84.9 FL (ref 81–99)
MONOCYTES # BLD: 0.7 K/UL (ref 0–0.9)
MONOCYTES NFR BLD: 5.6 % (ref 0–10)
NEUTROPHILS # BLD: 7.3 K/UL (ref 1.5–7.5)
NEUTS SEG NFR BLD: 60.1 % (ref 50–65)
PERFORMED ON: ABNORMAL
PLATELET # BLD AUTO: 282 K/UL (ref 130–400)
PMV BLD AUTO: 10.4 FL (ref 9.4–12.3)
POTASSIUM SERPL-SCNC: 5.2 MMOL/L (ref 3.5–5)
RBC # BLD AUTO: 4.1 M/UL (ref 4.2–5.4)
SODIUM SERPL-SCNC: 136 MMOL/L (ref 136–145)
WBC # BLD AUTO: 12.1 K/UL (ref 4.8–10.8)

## 2024-09-06 PROCEDURE — 6360000002 HC RX W HCPCS: Performed by: STUDENT IN AN ORGANIZED HEALTH CARE EDUCATION/TRAINING PROGRAM

## 2024-09-06 PROCEDURE — 6360000002 HC RX W HCPCS: Performed by: SURGERY

## 2024-09-06 PROCEDURE — 2580000003 HC RX 258: Performed by: SURGERY

## 2024-09-06 PROCEDURE — 03CL0ZZ EXTIRPATION OF MATTER FROM LEFT INTERNAL CAROTID ARTERY, OPEN APPROACH: ICD-10-PCS | Performed by: SURGERY

## 2024-09-06 PROCEDURE — 99233 SBSQ HOSP IP/OBS HIGH 50: CPT | Performed by: PSYCHIATRY & NEUROLOGY

## 2024-09-06 PROCEDURE — 6370000000 HC RX 637 (ALT 250 FOR IP): Performed by: STUDENT IN AN ORGANIZED HEALTH CARE EDUCATION/TRAINING PROGRAM

## 2024-09-06 PROCEDURE — 36415 COLL VENOUS BLD VENIPUNCTURE: CPT

## 2024-09-06 PROCEDURE — 2709999900 HC NON-CHARGEABLE SUPPLY: Performed by: SURGERY

## 2024-09-06 PROCEDURE — 4A03X5D MEASUREMENT OF ARTERIAL FLOW, INTRACRANIAL, EXTERNAL APPROACH: ICD-10-PCS | Performed by: SURGERY

## 2024-09-06 PROCEDURE — 88304 TISSUE EXAM BY PATHOLOGIST: CPT

## 2024-09-06 PROCEDURE — 3600000005 HC SURGERY LEVEL 5 BASE: Performed by: SURGERY

## 2024-09-06 PROCEDURE — 6360000002 HC RX W HCPCS: Performed by: NURSE ANESTHETIST, CERTIFIED REGISTERED

## 2024-09-06 PROCEDURE — 03UL0KZ SUPPLEMENT LEFT INTERNAL CAROTID ARTERY WITH NONAUTOLOGOUS TISSUE SUBSTITUTE, OPEN APPROACH: ICD-10-PCS | Performed by: SURGERY

## 2024-09-06 PROCEDURE — B3171ZZ FLUOROSCOPY OF LEFT INTERNAL CAROTID ARTERY USING LOW OSMOLAR CONTRAST: ICD-10-PCS | Performed by: SURGERY

## 2024-09-06 PROCEDURE — 86901 BLOOD TYPING SEROLOGIC RH(D): CPT

## 2024-09-06 PROCEDURE — 6360000002 HC RX W HCPCS: Performed by: ANESTHESIOLOGY

## 2024-09-06 PROCEDURE — 2580000003 HC RX 258: Performed by: ANESTHESIOLOGY

## 2024-09-06 PROCEDURE — 7100000001 HC PACU RECOVERY - ADDTL 15 MIN: Performed by: SURGERY

## 2024-09-06 PROCEDURE — 2580000003 HC RX 258: Performed by: STUDENT IN AN ORGANIZED HEALTH CARE EDUCATION/TRAINING PROGRAM

## 2024-09-06 PROCEDURE — 3700000000 HC ANESTHESIA ATTENDED CARE: Performed by: SURGERY

## 2024-09-06 PROCEDURE — 2580000003 HC RX 258: Performed by: NURSE ANESTHETIST, CERTIFIED REGISTERED

## 2024-09-06 PROCEDURE — 6370000000 HC RX 637 (ALT 250 FOR IP): Performed by: SURGERY

## 2024-09-06 PROCEDURE — A4217 STERILE WATER/SALINE, 500 ML: HCPCS | Performed by: SURGERY

## 2024-09-06 PROCEDURE — 3600000015 HC SURGERY LEVEL 5 ADDTL 15MIN: Performed by: SURGERY

## 2024-09-06 PROCEDURE — 2700000000 HC OXYGEN THERAPY PER DAY

## 2024-09-06 PROCEDURE — 7100000000 HC PACU RECOVERY - FIRST 15 MIN: Performed by: SURGERY

## 2024-09-06 PROCEDURE — 88311 DECALCIFY TISSUE: CPT

## 2024-09-06 PROCEDURE — 80048 BASIC METABOLIC PNL TOTAL CA: CPT

## 2024-09-06 PROCEDURE — 94150 VITAL CAPACITY TEST: CPT

## 2024-09-06 PROCEDURE — C1768 GRAFT, VASCULAR: HCPCS | Performed by: SURGERY

## 2024-09-06 PROCEDURE — 86900 BLOOD TYPING SEROLOGIC ABO: CPT

## 2024-09-06 PROCEDURE — 2000000000 HC ICU R&B

## 2024-09-06 PROCEDURE — 3700000001 HC ADD 15 MINUTES (ANESTHESIA): Performed by: SURGERY

## 2024-09-06 PROCEDURE — 2500000003 HC RX 250 WO HCPCS: Performed by: NURSE ANESTHETIST, CERTIFIED REGISTERED

## 2024-09-06 PROCEDURE — 85025 COMPLETE CBC W/AUTO DIFF WBC: CPT

## 2024-09-06 PROCEDURE — 82962 GLUCOSE BLOOD TEST: CPT

## 2024-09-06 PROCEDURE — 94640 AIRWAY INHALATION TREATMENT: CPT

## 2024-09-06 PROCEDURE — 86850 RBC ANTIBODY SCREEN: CPT

## 2024-09-06 PROCEDURE — 2500000003 HC RX 250 WO HCPCS: Performed by: STUDENT IN AN ORGANIZED HEALTH CARE EDUCATION/TRAINING PROGRAM

## 2024-09-06 PROCEDURE — 6370000000 HC RX 637 (ALT 250 FOR IP): Performed by: PSYCHIATRY & NEUROLOGY

## 2024-09-06 PROCEDURE — 94760 N-INVAS EAR/PLS OXIMETRY 1: CPT

## 2024-09-06 DEVICE — XENOSURE BIOLOGIC PATCH, 1CM X 14CM, EIFU
Type: IMPLANTABLE DEVICE | Site: CAROTID | Status: FUNCTIONAL
Brand: XENOSURE BIOLOGIC PATCH

## 2024-09-06 RX ORDER — HYDROMORPHONE HYDROCHLORIDE 1 MG/ML
0.25 INJECTION, SOLUTION INTRAMUSCULAR; INTRAVENOUS; SUBCUTANEOUS
Status: DISCONTINUED | OUTPATIENT
Start: 2024-09-06 | End: 2024-09-12 | Stop reason: HOSPADM

## 2024-09-06 RX ORDER — HEPARIN SODIUM 1000 [USP'U]/ML
INJECTION, SOLUTION INTRAVENOUS; SUBCUTANEOUS PRN
Status: DISCONTINUED | OUTPATIENT
Start: 2024-09-06 | End: 2024-09-06 | Stop reason: SDUPTHER

## 2024-09-06 RX ORDER — HYDROMORPHONE HYDROCHLORIDE 1 MG/ML
0.5 INJECTION, SOLUTION INTRAMUSCULAR; INTRAVENOUS; SUBCUTANEOUS
Status: DISCONTINUED | OUTPATIENT
Start: 2024-09-06 | End: 2024-09-12 | Stop reason: HOSPADM

## 2024-09-06 RX ORDER — CEFAZOLIN SODIUM 1 G/3ML
INJECTION, POWDER, FOR SOLUTION INTRAMUSCULAR; INTRAVENOUS PRN
Status: DISCONTINUED | OUTPATIENT
Start: 2024-09-06 | End: 2024-09-06 | Stop reason: SDUPTHER

## 2024-09-06 RX ORDER — FENTANYL CITRATE 50 UG/ML
50 INJECTION, SOLUTION INTRAMUSCULAR; INTRAVENOUS EVERY 5 MIN PRN
Status: DISCONTINUED | OUTPATIENT
Start: 2024-09-06 | End: 2024-09-06 | Stop reason: HOSPADM

## 2024-09-06 RX ORDER — SODIUM CHLORIDE 9 MG/ML
INJECTION, SOLUTION INTRAVENOUS PRN
Status: DISCONTINUED | OUTPATIENT
Start: 2024-09-06 | End: 2024-09-12 | Stop reason: HOSPADM

## 2024-09-06 RX ORDER — RIFAMPIN 600 MG/10ML
INJECTION, POWDER, LYOPHILIZED, FOR SOLUTION INTRAVENOUS PRN
Status: DISCONTINUED | OUTPATIENT
Start: 2024-09-06 | End: 2024-09-06 | Stop reason: HOSPADM

## 2024-09-06 RX ORDER — LIDOCAINE HYDROCHLORIDE 10 MG/ML
1 INJECTION, SOLUTION EPIDURAL; INFILTRATION; INTRACAUDAL; PERINEURAL
Status: DISCONTINUED | OUTPATIENT
Start: 2024-09-06 | End: 2024-09-06 | Stop reason: HOSPADM

## 2024-09-06 RX ORDER — SODIUM CHLORIDE 0.9 % (FLUSH) 0.9 %
5-40 SYRINGE (ML) INJECTION EVERY 12 HOURS SCHEDULED
Status: DISCONTINUED | OUTPATIENT
Start: 2024-09-06 | End: 2024-09-06 | Stop reason: HOSPADM

## 2024-09-06 RX ORDER — SODIUM CHLORIDE 9 MG/ML
INJECTION, SOLUTION INTRAVENOUS CONTINUOUS
Status: ACTIVE | OUTPATIENT
Start: 2024-09-06 | End: 2024-09-06

## 2024-09-06 RX ORDER — ONDANSETRON 2 MG/ML
4 INJECTION INTRAMUSCULAR; INTRAVENOUS
Status: DISCONTINUED | OUTPATIENT
Start: 2024-09-06 | End: 2024-09-06 | Stop reason: HOSPADM

## 2024-09-06 RX ORDER — PROCHLORPERAZINE EDISYLATE 5 MG/ML
5 INJECTION INTRAMUSCULAR; INTRAVENOUS
Status: DISCONTINUED | OUTPATIENT
Start: 2024-09-06 | End: 2024-09-06 | Stop reason: HOSPADM

## 2024-09-06 RX ORDER — SODIUM CHLORIDE 0.9 % (FLUSH) 0.9 %
5-40 SYRINGE (ML) INJECTION EVERY 12 HOURS SCHEDULED
Status: DISCONTINUED | OUTPATIENT
Start: 2024-09-06 | End: 2024-09-12 | Stop reason: HOSPADM

## 2024-09-06 RX ORDER — SODIUM CHLORIDE 9 MG/ML
INJECTION, SOLUTION INTRAVENOUS PRN
Status: DISCONTINUED | OUTPATIENT
Start: 2024-09-06 | End: 2024-09-06 | Stop reason: HOSPADM

## 2024-09-06 RX ORDER — ONDANSETRON 2 MG/ML
INJECTION INTRAMUSCULAR; INTRAVENOUS PRN
Status: DISCONTINUED | OUTPATIENT
Start: 2024-09-06 | End: 2024-09-06 | Stop reason: SDUPTHER

## 2024-09-06 RX ORDER — SODIUM CHLORIDE 0.9 % (FLUSH) 0.9 %
5-40 SYRINGE (ML) INJECTION PRN
Status: DISCONTINUED | OUTPATIENT
Start: 2024-09-06 | End: 2024-09-12 | Stop reason: HOSPADM

## 2024-09-06 RX ORDER — ROCURONIUM BROMIDE 10 MG/ML
INJECTION, SOLUTION INTRAVENOUS PRN
Status: DISCONTINUED | OUTPATIENT
Start: 2024-09-06 | End: 2024-09-06 | Stop reason: SDUPTHER

## 2024-09-06 RX ORDER — PROPOFOL 10 MG/ML
INJECTION, EMULSION INTRAVENOUS PRN
Status: DISCONTINUED | OUTPATIENT
Start: 2024-09-06 | End: 2024-09-06 | Stop reason: SDUPTHER

## 2024-09-06 RX ORDER — SODIUM CHLORIDE 0.9 % (FLUSH) 0.9 %
5-40 SYRINGE (ML) INJECTION PRN
Status: DISCONTINUED | OUTPATIENT
Start: 2024-09-06 | End: 2024-09-06 | Stop reason: HOSPADM

## 2024-09-06 RX ORDER — DIPHENHYDRAMINE HYDROCHLORIDE 50 MG/ML
12.5 INJECTION INTRAMUSCULAR; INTRAVENOUS
Status: DISCONTINUED | OUTPATIENT
Start: 2024-09-06 | End: 2024-09-06 | Stop reason: HOSPADM

## 2024-09-06 RX ORDER — DILTIAZEM HYDROCHLORIDE 5 MG/ML
10 INJECTION INTRAVENOUS ONCE
Status: COMPLETED | OUTPATIENT
Start: 2024-09-06 | End: 2024-09-06

## 2024-09-06 RX ORDER — NALOXONE HYDROCHLORIDE 0.4 MG/ML
INJECTION, SOLUTION INTRAMUSCULAR; INTRAVENOUS; SUBCUTANEOUS PRN
Status: DISCONTINUED | OUTPATIENT
Start: 2024-09-06 | End: 2024-09-06 | Stop reason: HOSPADM

## 2024-09-06 RX ORDER — FENTANYL CITRATE 50 UG/ML
25 INJECTION, SOLUTION INTRAMUSCULAR; INTRAVENOUS EVERY 5 MIN PRN
Status: DISCONTINUED | OUTPATIENT
Start: 2024-09-06 | End: 2024-09-06 | Stop reason: HOSPADM

## 2024-09-06 RX ORDER — FENTANYL CITRATE 50 UG/ML
INJECTION, SOLUTION INTRAMUSCULAR; INTRAVENOUS PRN
Status: DISCONTINUED | OUTPATIENT
Start: 2024-09-06 | End: 2024-09-06 | Stop reason: SDUPTHER

## 2024-09-06 RX ORDER — HYDRALAZINE HYDROCHLORIDE 20 MG/ML
10 INJECTION INTRAMUSCULAR; INTRAVENOUS
Status: DISCONTINUED | OUTPATIENT
Start: 2024-09-06 | End: 2024-09-12 | Stop reason: HOSPADM

## 2024-09-06 RX ORDER — DILTIAZEM HYDROCHLORIDE 5 MG/ML
10 INJECTION INTRAVENOUS ONCE
Status: DISCONTINUED | OUTPATIENT
Start: 2024-09-06 | End: 2024-09-06 | Stop reason: SDUPTHER

## 2024-09-06 RX ORDER — DEXAMETHASONE SODIUM PHOSPHATE 4 MG/ML
INJECTION, SOLUTION INTRA-ARTICULAR; INTRALESIONAL; INTRAMUSCULAR; INTRAVENOUS; SOFT TISSUE PRN
Status: DISCONTINUED | OUTPATIENT
Start: 2024-09-06 | End: 2024-09-06 | Stop reason: SDUPTHER

## 2024-09-06 RX ORDER — SODIUM CHLORIDE, SODIUM LACTATE, POTASSIUM CHLORIDE, CALCIUM CHLORIDE 600; 310; 30; 20 MG/100ML; MG/100ML; MG/100ML; MG/100ML
INJECTION, SOLUTION INTRAVENOUS CONTINUOUS
Status: DISCONTINUED | OUTPATIENT
Start: 2024-09-06 | End: 2024-09-06 | Stop reason: HOSPADM

## 2024-09-06 RX ORDER — MIDAZOLAM HYDROCHLORIDE 2 MG/2ML
2 INJECTION, SOLUTION INTRAMUSCULAR; INTRAVENOUS
Status: DISCONTINUED | OUTPATIENT
Start: 2024-09-06 | End: 2024-09-06 | Stop reason: HOSPADM

## 2024-09-06 RX ORDER — LIDOCAINE HYDROCHLORIDE 10 MG/ML
INJECTION, SOLUTION INFILTRATION; PERINEURAL PRN
Status: DISCONTINUED | OUTPATIENT
Start: 2024-09-06 | End: 2024-09-06 | Stop reason: SDUPTHER

## 2024-09-06 RX ORDER — METOPROLOL TARTRATE 25 MG/1
25 TABLET, FILM COATED ORAL EVERY 12 HOURS PRN
Status: DISCONTINUED | OUTPATIENT
Start: 2024-09-06 | End: 2024-09-12 | Stop reason: HOSPADM

## 2024-09-06 RX ORDER — DEXMEDETOMIDINE HYDROCHLORIDE 100 UG/ML
INJECTION, SOLUTION INTRAVENOUS PRN
Status: DISCONTINUED | OUTPATIENT
Start: 2024-09-06 | End: 2024-09-06 | Stop reason: SDUPTHER

## 2024-09-06 RX ADMIN — HYDROCODONE BITARTRATE AND ACETAMINOPHEN 1 TABLET: 7.5; 325 TABLET ORAL at 09:37

## 2024-09-06 RX ADMIN — TRAZODONE HYDROCHLORIDE 100 MG: 100 TABLET ORAL at 19:22

## 2024-09-06 RX ADMIN — FENTANYL CITRATE 50 MCG: 0.05 INJECTION, SOLUTION INTRAMUSCULAR; INTRAVENOUS at 12:45

## 2024-09-06 RX ADMIN — HEPARIN SODIUM 5000 UNITS: 1000 INJECTION, SOLUTION INTRAVENOUS; SUBCUTANEOUS at 13:40

## 2024-09-06 RX ADMIN — PHENYLEPHRINE HYDROCHLORIDE 100 MCG/MIN: 10 INJECTION INTRAVENOUS at 12:51

## 2024-09-06 RX ADMIN — ATORVASTATIN CALCIUM 40 MG: 40 TABLET, FILM COATED ORAL at 19:22

## 2024-09-06 RX ADMIN — SODIUM CHLORIDE, SODIUM LACTATE, POTASSIUM CHLORIDE, AND CALCIUM CHLORIDE: 600; 310; 30; 20 INJECTION, SOLUTION INTRAVENOUS at 12:02

## 2024-09-06 RX ADMIN — IPRATROPIUM BROMIDE AND ALBUTEROL SULFATE 1 DOSE: 2.5; .5 SOLUTION RESPIRATORY (INHALATION) at 09:48

## 2024-09-06 RX ADMIN — HYDROMORPHONE HYDROCHLORIDE 0.5 MG: 1 INJECTION, SOLUTION INTRAMUSCULAR; INTRAVENOUS; SUBCUTANEOUS at 20:07

## 2024-09-06 RX ADMIN — SODIUM CHLORIDE: 9 INJECTION, SOLUTION INTRAVENOUS at 16:13

## 2024-09-06 RX ADMIN — FENTANYL CITRATE 50 MCG: 50 INJECTION INTRAMUSCULAR; INTRAVENOUS at 15:54

## 2024-09-06 RX ADMIN — DEXMEDETOMIDINE HYDROCHLORIDE 12 MCG: 100 INJECTION, SOLUTION, CONCENTRATE INTRAVENOUS at 12:44

## 2024-09-06 RX ADMIN — SODIUM CHLORIDE, PRESERVATIVE FREE 10 ML: 5 INJECTION INTRAVENOUS at 19:23

## 2024-09-06 RX ADMIN — ONDANSETRON 4 MG: 2 INJECTION INTRAMUSCULAR; INTRAVENOUS at 12:34

## 2024-09-06 RX ADMIN — DEXMEDETOMIDINE HYDROCHLORIDE 8 MCG: 100 INJECTION, SOLUTION, CONCENTRATE INTRAVENOUS at 13:08

## 2024-09-06 RX ADMIN — PROPOFOL 150 MG: 10 INJECTION, EMULSION INTRAVENOUS at 12:46

## 2024-09-06 RX ADMIN — HYDROCODONE BITARTRATE AND ACETAMINOPHEN 1 TABLET: 7.5; 325 TABLET ORAL at 01:19

## 2024-09-06 RX ADMIN — DILTIAZEM HYDROCHLORIDE 10 MG: 5 INJECTION, SOLUTION INTRAVENOUS at 11:07

## 2024-09-06 RX ADMIN — IPRATROPIUM BROMIDE AND ALBUTEROL SULFATE 1 DOSE: 2.5; .5 SOLUTION RESPIRATORY (INHALATION) at 06:11

## 2024-09-06 RX ADMIN — FENTANYL CITRATE 50 MCG: 0.05 INJECTION, SOLUTION INTRAMUSCULAR; INTRAVENOUS at 12:39

## 2024-09-06 RX ADMIN — HYDRALAZINE HYDROCHLORIDE 10 MG: 20 INJECTION INTRAMUSCULAR; INTRAVENOUS at 17:59

## 2024-09-06 RX ADMIN — LIDOCAINE HYDROCHLORIDE 25 MG: 10 INJECTION, SOLUTION INFILTRATION; PERINEURAL at 12:44

## 2024-09-06 RX ADMIN — HYDROMORPHONE HYDROCHLORIDE 0.5 MG: 1 INJECTION, SOLUTION INTRAMUSCULAR; INTRAVENOUS; SUBCUTANEOUS at 17:03

## 2024-09-06 RX ADMIN — CEFAZOLIN 3 G: 1 INJECTION, POWDER, FOR SOLUTION INTRAMUSCULAR; INTRAVENOUS at 13:04

## 2024-09-06 RX ADMIN — HYDRALAZINE HYDROCHLORIDE 10 MG: 20 INJECTION INTRAMUSCULAR; INTRAVENOUS at 19:01

## 2024-09-06 RX ADMIN — AZITHROMYCIN MONOHYDRATE 500 MG: 500 INJECTION, POWDER, LYOPHILIZED, FOR SOLUTION INTRAVENOUS at 01:11

## 2024-09-06 RX ADMIN — METOPROLOL SUCCINATE 25 MG: 25 TABLET, EXTENDED RELEASE ORAL at 09:36

## 2024-09-06 RX ADMIN — ROCURONIUM BROMIDE 50 MG: 10 INJECTION, SOLUTION INTRAVENOUS at 13:20

## 2024-09-06 RX ADMIN — WATER 1000 MG: 1 INJECTION INTRAMUSCULAR; INTRAVENOUS; SUBCUTANEOUS at 23:33

## 2024-09-06 RX ADMIN — SODIUM CHLORIDE, SODIUM LACTATE, POTASSIUM CHLORIDE, AND CALCIUM CHLORIDE: 600; 310; 30; 20 INJECTION, SOLUTION INTRAVENOUS at 12:34

## 2024-09-06 RX ADMIN — WATER 1000 MG: 1 INJECTION INTRAMUSCULAR; INTRAVENOUS; SUBCUTANEOUS at 01:05

## 2024-09-06 RX ADMIN — INSULIN LISPRO 2 UNITS: 100 INJECTION, SOLUTION INTRAVENOUS; SUBCUTANEOUS at 17:59

## 2024-09-06 RX ADMIN — FENTANYL CITRATE 100 MCG: 0.05 INJECTION, SOLUTION INTRAMUSCULAR; INTRAVENOUS at 13:22

## 2024-09-06 RX ADMIN — TIZANIDINE 4 MG: 4 TABLET ORAL at 09:37

## 2024-09-06 RX ADMIN — GABAPENTIN 300 MG: 300 CAPSULE ORAL at 21:21

## 2024-09-06 RX ADMIN — DEXAMETHASONE SODIUM PHOSPHATE 10 MG: 4 INJECTION, SOLUTION INTRAMUSCULAR; INTRAVENOUS at 12:34

## 2024-09-06 RX ADMIN — ROCURONIUM BROMIDE 50 MG: 10 INJECTION, SOLUTION INTRAVENOUS at 12:46

## 2024-09-06 RX ADMIN — IPRATROPIUM BROMIDE AND ALBUTEROL SULFATE 1 DOSE: 2.5; .5 SOLUTION RESPIRATORY (INHALATION) at 19:24

## 2024-09-06 RX ADMIN — HYDROMORPHONE HYDROCHLORIDE 0.5 MG: 1 INJECTION, SOLUTION INTRAMUSCULAR; INTRAVENOUS; SUBCUTANEOUS at 23:26

## 2024-09-06 RX ADMIN — SODIUM CHLORIDE, SODIUM LACTATE, POTASSIUM CHLORIDE, AND CALCIUM CHLORIDE: 600; 310; 30; 20 INJECTION, SOLUTION INTRAVENOUS at 14:54

## 2024-09-06 RX ADMIN — SODIUM CHLORIDE, PRESERVATIVE FREE 10 ML: 5 INJECTION INTRAVENOUS at 21:36

## 2024-09-06 RX ADMIN — HYDROMORPHONE HYDROCHLORIDE 1 MG: 1 INJECTION, SOLUTION INTRAMUSCULAR; INTRAVENOUS; SUBCUTANEOUS at 14:39

## 2024-09-06 RX ADMIN — PHENYLEPHRINE HYDROCHLORIDE 200 MCG: 10 INJECTION INTRAVENOUS at 12:46

## 2024-09-06 RX ADMIN — DILTIAZEM HYDROCHLORIDE 300 MG: 180 CAPSULE, COATED, EXTENDED RELEASE ORAL at 09:36

## 2024-09-06 RX ADMIN — FENTANYL CITRATE 50 MCG: 0.05 INJECTION, SOLUTION INTRAMUSCULAR; INTRAVENOUS at 12:32

## 2024-09-06 ASSESSMENT — PAIN DESCRIPTION - DESCRIPTORS
DESCRIPTORS: ACHING
DESCRIPTORS: ACHING;SPASM
DESCRIPTORS: ACHING

## 2024-09-06 ASSESSMENT — PAIN SCALES - GENERAL
PAINLEVEL_OUTOF10: 9
PAINLEVEL_OUTOF10: 3
PAINLEVEL_OUTOF10: 5
PAINLEVEL_OUTOF10: 8
PAINLEVEL_OUTOF10: 8
PAINLEVEL_OUTOF10: 3
PAINLEVEL_OUTOF10: 8
PAINLEVEL_OUTOF10: 9

## 2024-09-06 ASSESSMENT — PAIN DESCRIPTION - ORIENTATION
ORIENTATION: LEFT
ORIENTATION: LEFT
ORIENTATION: RIGHT
ORIENTATION: LEFT

## 2024-09-06 ASSESSMENT — PAIN DESCRIPTION - LOCATION
LOCATION: NECK
LOCATION: HIP;HEAD
LOCATION: NECK
LOCATION: NECK

## 2024-09-06 ASSESSMENT — PAIN - FUNCTIONAL ASSESSMENT: PAIN_FUNCTIONAL_ASSESSMENT: PREVENTS OR INTERFERES SOME ACTIVE ACTIVITIES AND ADLS

## 2024-09-06 ASSESSMENT — LIFESTYLE VARIABLES: SMOKING_STATUS: 0

## 2024-09-06 NOTE — ANESTHESIA POSTPROCEDURE EVALUATION
Department of Anesthesiology  Postprocedure Note    Patient: Mary Lira  MRN: [id number]  YOB: 1975  Date of evaluation: 9/6/2024    Procedure Summary       Date: 09/06/24 Room / Location: [address]    Anesthesia Start: 1234 Anesthesia Stop: 1520    Procedure: CAROTID ENDARTERECTOMY (Left) Diagnosis:       Left carotid artery stenosis      (Left carotid artery stenosis [I65.22])    Surgeons: Eliezer Palencia MD Responsible Provider: Nas Chakraborty APRN - CRNA    Anesthesia Type: general ASA Status: 4            Anesthesia Type: No value filed.    Di Phase I: Di Score: 8    Di Phase II:      Anesthesia Post Evaluation    Patient location during evaluation: PACU  Patient participation: complete - patient participated  Level of consciousness: awake and lethargic  Pain score: 0  Airway patency: patent  Nausea & Vomiting: no nausea and no vomiting  Cardiovascular status: hemodynamically stable  Respiratory status: spontaneous ventilation, nonlabored ventilation and face mask  Hydration status: stable  Multimodal analgesia pain management approach    No notable events documented.

## 2024-09-06 NOTE — PROGRESS NOTES
Occupational Therapy  Out for procedure.  Electronically signed by Pao Moreno OT on 9/6/2024 at 2:12 PM

## 2024-09-06 NOTE — PROGRESS NOTES
Daily Progress Note    Date:2024  Patient: Mary Lira  : 1975  MRN:[id number]  CODE:Full Code No additional code details  PCP:Bruno Manning MD    Admit Date: 9/3/2024 12:28 AM   LOS: 3 days     Chief Complaint   Patient presents with    Seizures    Hypoglycemia           Altered Mental Status         Subjective: Back into Afib with RVR around midday. Given 10 mg IV Cardizem with improvement in HR to 90s. She went to OR afterwards for carotid endarterectomy.   She feels better from a dyspnea and cough standpoint. No fevers or chills.         Hospital Summary: 49 yo F with T2DM, CHF, HTN, ESTEVAN who presented to [facility] ED with AMS, slurred speech, and right sided weakness.  CT head negative for acute abnormalities.  Admitted to hospitalist service for further management.   MRI brain showed 2 small foci of acute infarction within left parietal and left frontal lobe.   CTA neck showed ~90% stenosis of left ICA. Vascular surgery was consulted and considering carotid intervention.  Cardiology consulted for preoperative assessment. Echo showed EF 55-60% without regional wall motion abnormalities, planning for Lexiscan tomorrow.    Pt reported productive cough and dyspnea. CXR showed left sided opacities/nodules, CT recommended for further evaluation. Pt started on empiric Azithromycin and Ceftriaxone.   CT chest showed possible pulmonary edema, left lower lobe pneumonia, and multiple left sided pulmonary nodules with the largest measuring 1.1 cm. Pt will need repeat CT chest in 3 months for surveillance.         Review of Systems   All other systems reviewed and are negative.      Objective:      Vital signs in last 24 hours:  Patient Vitals for the past 24 hrs:   BP Temp Temp src Pulse Resp SpO2   24 1608 (!) 149/69 97.8 °F (36.6 °C) Temporal 86 15 96 %   24 1555 (!) 156/86 -- -- 81 13 94 %   24 1554 (!) 156/86 -- -- 83 13 94 %   24 1550 (!) 153/70 -- -- 84 23 93 %   24 1545 --  reconstruction technique.  ______________________________________ Electronically signed by: ARNOLD HARRISON M.D. Date:     09/03/2024 Time:    01:28     CT HEAD WO CONTRAST    Result Date: 9/3/2024  1.  CT of the brain within normal limits.  Discussed with ordering physician 0054 hours Central time 09/03/2024.  All CT scans are performed using dose optimization techniques as appropriate to the performed exam and include at least one of the following: Automated exposure control, adjustment of the mA and/or kV according to size, and the use of iterative reconstruction technique.  ______________________________________ Electronically signed by: JOANNA MACK M.D. Date:     09/03/2024 Time:    00:48          Assessment/Plan  Principal Problem:    Pneumonia due to other specified bacteria (HCC)  Active Problems:    Stroke-like symptoms    Hyperlipidemia    Acute ischemic stroke (HCC)    Hypotension    Diarrhea    Diabetes (HCC)    Essential hypertension    Hemiplegia affecting dominant side (HCC)    Stenosis of left carotid artery  Resolved Problems:    * No resolved hospital problems. *     Acute CVA  Bilateral internal carotid artery stenosis  -- MRI reviewed, small acute infarcts in left parietal and frontal lobe  -- Neurology following  -- CTA neck showed ~90% left ICA stenosis - Carotid duplex also showed bilateral internal carotid stenosis - likely source of CVA  -- Vascular surgery consulted, considering carotid intervention either tomorrow or Saturday  -- Cardiology consulted for preoperative assessment - Echo showed normal EF without regional wall motion abnormalities - Lexiscan unremarkable - stable for OR from cardiology perspective  -- Continue Plavix and statin; Stop ASA as therapeutic Lovenox was added for AC with plan to transition to Eliquis after carotid intervention    Left lower lobe pneumonia  -- CT chest reviewed  -- Continue Azithromycin and Ceftriaxone  -- Add sputum cx    Afib with RVR  --

## 2024-09-06 NOTE — PROGRESS NOTES
I agree with the history and physical exam in chart.    Her strength in the right arm and leg has improved. Cardiology cleared for surgery.  In addition, today's complete ROS was performed and the only positives are noted in the HPI.  I examined the patient preoperatively and discussed the surgery (left carotid endarterectomy), including the risks, benefits, and alternatives.  She seems to understand and agree to proceed.

## 2024-09-06 NOTE — PROGRESS NOTES
Patient:   Mary Lira  MR#:    [id number]   Room:    0426/426-02   YOB: 1975  Date of Progress Note: 9/6/2024  Time of Note                           7:34 AM  Consulting Physician:   Sandeep Diaz M.D.  Attending Physician:  Bashir Bhakta MD     Chief complaint Right-sided weakness/slurred speech/hypotension/hypoglycemia     S:This 48 y.o. female  with past medical history significant for atrial fibrillation not on anticoagulation, history of GI bleed, diabetes, CHF and hyperlipidemia is seen for evaluation of right-sided weakness and confusion.  On the day of admission the patient was last known well around 9 PM.  She went to sleep.  A few hours later the family awakened her when her Dexcom notified that her blood sugar was 30.  She was confused and disoriented when awoken.  She was found to have right-sided weakness and slurred speech.  She was brought in for evaluation.  She has been coughing for about 1 week along with some diarrhea.  She denies any previous history of stroke.  She is not on blood thinners.  CT of the head with no acute changes.  CT of the head and neck with no evidence of thrombus but with severe left carotid stenosis approximately approximately 80-90%.  She was transferred to the ICU and placed on pressors.  EKG with sinus rhythm.  Chest x-ray nodular opacities in the left upper lung.  Troponin borderline elevated.  Respiratory panel negative.  Serum creatinine elevated 1.2.  Discussions were held with ER physician approximately 4 hours and 15 minutes from time last known well.  Exam at that time revealed some drift in the right arm and right leg.  Movement improved on the right.  No new complaints.  Atrial fibrillation noted.  Feeling better this morning.      REVIEW OF SYSTEMS:  Constitutional: No fevers No chills  Neck:No stiffness  Respiratory: No shortness of breath  Cardiovascular: No chest pain No palpitations  Gastrointestinal: No abdominal pain    Genitourinary:  No Dysuria  Neurological: + headache, no confusion    Past Medical History:      Diagnosis Date    Anxiety     Apnea     CHF (congestive heart failure) (HCC)     Diabetes mellitus (HCC)     Hyperlipidemia     Hypertension        Past Surgical History:      Procedure Laterality Date    APPENDECTOMY         Medications in Hospital:      Current Facility-Administered Medications:     dilTIAZem (CARDIZEM CD) extended release capsule 300 mg, 300 mg, Oral, Daily, Bashir Bhakta MD, 300 mg at 09/05/24 1006    metoprolol succinate (TOPROL XL) extended release tablet 25 mg, 25 mg, Oral, Daily, Bashir Bhakta MD, 25 mg at 09/05/24 1007    tiZANidine (ZANAFLEX) tablet 4 mg, 4 mg, Oral, Q6H PRN, Bashir Bhakta MD, 4 mg at 09/05/24 2116    gabapentin (NEURONTIN) capsule 300 mg, 300 mg, Oral, TID, Bashir Bhakta MD, 300 mg at 09/05/24 2114    enoxaparin (LOVENOX) injection 120 mg, 1 mg/kg, SubCUTAneous, BID, Bashir Bhakta MD, 120 mg at 09/05/24 2120    insulin glargine (LANTUS) injection vial 10 Units, 10 Units, SubCUTAneous, Daily, Bashir Bhakta MD, 10 Units at 09/04/24 0856    glucose chewable tablet 16 g, 4 tablet, Oral, PRN, Bashir Bhakta MD    dextrose bolus 10% 125 mL, 125 mL, IntraVENous, PRN **OR** dextrose bolus 10% 250 mL, 250 mL, IntraVENous, PRN, Bashir Bhakta MD    glucagon injection 1 mg, 1 mg, SubCUTAneous, PRN, Bashir Bhakta MD    dextrose 10 % infusion, , IntraVENous, Continuous PRN, Bashir Bhakta MD    butalbital-acetaminophen-caffeine (FIORICET, ESGIC) per tablet 1 tablet, 1 tablet, Oral, Q6H PRN, Bashir Bhakta MD, 1 tablet at 09/05/24 1007    magnesium sulfate 1000 mg in dextrose 5% 100 mL IVPB, 1,000 mg, IntraVENous, PRN, Jose Magaña MD    potassium chloride (KLOR-CON M) extended release tablet 40 mEq, 40 mEq, Oral, PRN **OR** potassium bicarb-citric acid (EFFER-K) effervescent tablet 40 mEq, 40 mEq, Oral, PRN **OR** potassium chloride 10 mEq/100 mL IVPB (Peripheral Line), 10 mEq,

## 2024-09-06 NOTE — ANESTHESIA PRE PROCEDURE
Other Findings:         Anesthesia Plan      general     ASA 4     (Stat type and screen.  )  Induction: intravenous.  arterial line  MIPS: Postoperative opioids intended and Prophylactic antiemetics administered.  Anesthetic plan and risks discussed with patient.    Use of blood products discussed with patient whom consented to blood products.                    Tariq Almaguer DO   9/6/2024

## 2024-09-06 NOTE — OP NOTE
Preoperative Diagnosis:  1. Symptomatic left carotid artery stenosis  2. CVA prior to surgery with right sided weakness and left frontal, temporal infarcts    Post Op Diagnosis: Same    Operative Procedure:  1.  Left carotid endarterectomy with bovine pericardial patch  2.  Left cervical carotid arteriograms    Anesthesia:  General endotracheal    Estimated Blood Loss:  100 mL    Specimens:  Plaque to pathology    Drains:  Fluted JOEY right neck wound    Findings:   1.  There was at least 90 percent stenosis of the left internal carotid artery.    2.  The stenosis extended 3 cm beyond the carotid bulb.  3.  Post cervical carotid arteriogram showed a widely patent internal carotid artery. The external carotid artery remains patent.    Procedure in detail:    After the patient was consented and given intravenous antibiotics, she was brought to the operating room and placed on the operating room table in the supine position.  General endotracheal anesthesia was achieved.  The patient's left neck was prepped and draped in the usual sterile procedure.    A curvilinear incision in the left neck was made anterior to the sternocleidomastoid with scalpel.  This was carried through subcutaneous tissue and platysma with bovie electrocautery.  Facial vein branches are transected between hemostats and ligated with 3-0 Vicryl and 4-0 Vicryl sutures and/or clips.  The patient was given intravenous heparin and the left common carotid and superior thyroid arteries were then dissected circumferentially and vesseloops placed for future vascular control.  Finally, the internal carotid artery is dissected  and vessel loops were placed for future vascular control.  Ansa cervicalis was not transected between clips.  The hypoglossal and vagus nerves were carefully identified and protected throughout this dissection.     After adequate exposure and heparinization time, the distal internal, common and external carotid arteries are clamped.

## 2024-09-07 LAB
ANION GAP SERPL CALCULATED.3IONS-SCNC: 8 MMOL/L (ref 7–19)
BASOPHILS # BLD: 0 K/UL (ref 0–0.2)
BASOPHILS NFR BLD: 0.1 % (ref 0–1)
BUN SERPL-MCNC: 18 MG/DL (ref 6–20)
CALCIUM SERPL-MCNC: 8.8 MG/DL (ref 8.6–10)
CHLORIDE SERPL-SCNC: 100 MMOL/L (ref 98–111)
CO2 SERPL-SCNC: 28 MMOL/L (ref 22–29)
CREAT SERPL-MCNC: 0.6 MG/DL (ref 0.5–0.9)
EOSINOPHIL # BLD: 0 K/UL (ref 0–0.6)
EOSINOPHIL NFR BLD: 0 % (ref 0–5)
ERYTHROCYTE [DISTWIDTH] IN BLOOD BY AUTOMATED COUNT: 14.5 % (ref 11.5–14.5)
GLUCOSE BLD-MCNC: 172 MG/DL (ref 70–99)
GLUCOSE BLD-MCNC: 207 MG/DL (ref 70–99)
GLUCOSE BLD-MCNC: 224 MG/DL (ref 70–99)
GLUCOSE BLD-MCNC: 248 MG/DL (ref 70–99)
GLUCOSE SERPL-MCNC: 190 MG/DL (ref 70–99)
HCT VFR BLD AUTO: 33.3 % (ref 37–47)
HGB BLD-MCNC: 10 G/DL (ref 12–16)
IMM GRANULOCYTES # BLD: 0.1 K/UL
LYMPHOCYTES # BLD: 1.3 K/UL (ref 1.1–4.5)
LYMPHOCYTES NFR BLD: 8.7 % (ref 20–40)
MCH RBC QN AUTO: 24.8 PG (ref 27–31)
MCHC RBC AUTO-ENTMCNC: 30 G/DL (ref 33–37)
MCV RBC AUTO: 82.4 FL (ref 81–99)
MONOCYTES # BLD: 0.4 K/UL (ref 0–0.9)
MONOCYTES NFR BLD: 2.8 % (ref 0–10)
NEUTROPHILS # BLD: 13 K/UL (ref 1.5–7.5)
NEUTS SEG NFR BLD: 88 % (ref 50–65)
PERFORMED ON: ABNORMAL
PLATELET # BLD AUTO: 344 K/UL (ref 130–400)
PMV BLD AUTO: 10.3 FL (ref 9.4–12.3)
POTASSIUM SERPL-SCNC: 5.7 MMOL/L (ref 3.5–5)
RBC # BLD AUTO: 4.04 M/UL (ref 4.2–5.4)
SODIUM SERPL-SCNC: 136 MMOL/L (ref 136–145)
WBC # BLD AUTO: 14.8 K/UL (ref 4.8–10.8)

## 2024-09-07 PROCEDURE — 2580000003 HC RX 258: Performed by: SURGERY

## 2024-09-07 PROCEDURE — 6370000000 HC RX 637 (ALT 250 FOR IP): Performed by: SURGERY

## 2024-09-07 PROCEDURE — 6360000002 HC RX W HCPCS: Performed by: SURGERY

## 2024-09-07 PROCEDURE — 80048 BASIC METABOLIC PNL TOTAL CA: CPT

## 2024-09-07 PROCEDURE — 94640 AIRWAY INHALATION TREATMENT: CPT

## 2024-09-07 PROCEDURE — 99233 SBSQ HOSP IP/OBS HIGH 50: CPT | Performed by: PSYCHIATRY & NEUROLOGY

## 2024-09-07 PROCEDURE — 94760 N-INVAS EAR/PLS OXIMETRY 1: CPT

## 2024-09-07 PROCEDURE — 1200000000 HC SEMI PRIVATE

## 2024-09-07 PROCEDURE — 6370000000 HC RX 637 (ALT 250 FOR IP): Performed by: STUDENT IN AN ORGANIZED HEALTH CARE EDUCATION/TRAINING PROGRAM

## 2024-09-07 PROCEDURE — 85025 COMPLETE CBC W/AUTO DIFF WBC: CPT

## 2024-09-07 PROCEDURE — 2700000000 HC OXYGEN THERAPY PER DAY

## 2024-09-07 PROCEDURE — 82962 GLUCOSE BLOOD TEST: CPT

## 2024-09-07 RX ORDER — ASPIRIN 81 MG/1
81 TABLET, CHEWABLE ORAL DAILY
Status: DISCONTINUED | OUTPATIENT
Start: 2024-09-07 | End: 2024-09-12 | Stop reason: HOSPADM

## 2024-09-07 RX ADMIN — CLOPIDOGREL BISULFATE 75 MG: 75 TABLET ORAL at 08:05

## 2024-09-07 RX ADMIN — DILTIAZEM HYDROCHLORIDE 300 MG: 180 CAPSULE, COATED, EXTENDED RELEASE ORAL at 08:05

## 2024-09-07 RX ADMIN — METOPROLOL SUCCINATE 25 MG: 25 TABLET, EXTENDED RELEASE ORAL at 08:07

## 2024-09-07 RX ADMIN — BUSPIRONE HYDROCHLORIDE 5 MG: 5 TABLET ORAL at 08:06

## 2024-09-07 RX ADMIN — INSULIN GLARGINE 10 UNITS: 100 INJECTION, SOLUTION SUBCUTANEOUS at 08:06

## 2024-09-07 RX ADMIN — HYDROMORPHONE HYDROCHLORIDE 0.5 MG: 1 INJECTION, SOLUTION INTRAMUSCULAR; INTRAVENOUS; SUBCUTANEOUS at 10:41

## 2024-09-07 RX ADMIN — BENZONATATE 100 MG: 100 CAPSULE ORAL at 05:05

## 2024-09-07 RX ADMIN — SODIUM CHLORIDE, PRESERVATIVE FREE 10 ML: 5 INJECTION INTRAVENOUS at 20:39

## 2024-09-07 RX ADMIN — GABAPENTIN 300 MG: 300 CAPSULE ORAL at 08:05

## 2024-09-07 RX ADMIN — TRAZODONE HYDROCHLORIDE 100 MG: 100 TABLET ORAL at 20:37

## 2024-09-07 RX ADMIN — HYDROMORPHONE HYDROCHLORIDE 0.5 MG: 1 INJECTION, SOLUTION INTRAMUSCULAR; INTRAVENOUS; SUBCUTANEOUS at 05:44

## 2024-09-07 RX ADMIN — HYDROCODONE BITARTRATE AND ACETAMINOPHEN 1 TABLET: 7.5; 325 TABLET ORAL at 08:05

## 2024-09-07 RX ADMIN — GABAPENTIN 300 MG: 300 CAPSULE ORAL at 20:37

## 2024-09-07 RX ADMIN — INSULIN LISPRO 1 UNITS: 100 INJECTION, SOLUTION INTRAVENOUS; SUBCUTANEOUS at 17:52

## 2024-09-07 RX ADMIN — SODIUM CHLORIDE: 9 INJECTION, SOLUTION INTRAVENOUS at 01:20

## 2024-09-07 RX ADMIN — HYDRALAZINE HYDROCHLORIDE 10 MG: 20 INJECTION INTRAMUSCULAR; INTRAVENOUS at 04:38

## 2024-09-07 RX ADMIN — SERTRALINE HYDROCHLORIDE 100 MG: 100 TABLET ORAL at 08:06

## 2024-09-07 RX ADMIN — HYDROMORPHONE HYDROCHLORIDE 0.5 MG: 1 INJECTION, SOLUTION INTRAMUSCULAR; INTRAVENOUS; SUBCUTANEOUS at 20:38

## 2024-09-07 RX ADMIN — GABAPENTIN 300 MG: 300 CAPSULE ORAL at 13:42

## 2024-09-07 RX ADMIN — WATER 1000 MG: 1 INJECTION INTRAMUSCULAR; INTRAVENOUS; SUBCUTANEOUS at 23:22

## 2024-09-07 RX ADMIN — HYDROMORPHONE HYDROCHLORIDE 0.5 MG: 1 INJECTION, SOLUTION INTRAMUSCULAR; INTRAVENOUS; SUBCUTANEOUS at 13:42

## 2024-09-07 RX ADMIN — IPRATROPIUM BROMIDE AND ALBUTEROL SULFATE 1 DOSE: 2.5; .5 SOLUTION RESPIRATORY (INHALATION) at 20:59

## 2024-09-07 RX ADMIN — HYDROMORPHONE HYDROCHLORIDE 0.25 MG: 1 INJECTION, SOLUTION INTRAMUSCULAR; INTRAVENOUS; SUBCUTANEOUS at 02:39

## 2024-09-07 RX ADMIN — HYDROCODONE BITARTRATE AND ACETAMINOPHEN 1 TABLET: 7.5; 325 TABLET ORAL at 23:32

## 2024-09-07 RX ADMIN — IPRATROPIUM BROMIDE AND ALBUTEROL SULFATE 1 DOSE: 2.5; .5 SOLUTION RESPIRATORY (INHALATION) at 09:58

## 2024-09-07 RX ADMIN — HYDROCODONE BITARTRATE AND ACETAMINOPHEN 1 TABLET: 7.5; 325 TABLET ORAL at 12:23

## 2024-09-07 RX ADMIN — TIZANIDINE 4 MG: 4 TABLET ORAL at 20:37

## 2024-09-07 RX ADMIN — ASPIRIN 81 MG CHEWABLE TABLET 81 MG: 81 TABLET CHEWABLE at 17:51

## 2024-09-07 RX ADMIN — ATORVASTATIN CALCIUM 40 MG: 40 TABLET, FILM COATED ORAL at 20:37

## 2024-09-07 RX ADMIN — HYDROCODONE BITARTRATE AND ACETAMINOPHEN 1 TABLET: 7.5; 325 TABLET ORAL at 17:51

## 2024-09-07 RX ADMIN — SODIUM CHLORIDE, PRESERVATIVE FREE 10 ML: 5 INJECTION INTRAVENOUS at 20:45

## 2024-09-07 RX ADMIN — SODIUM ZIRCONIUM CYCLOSILICATE 10 G: 10 POWDER, FOR SUSPENSION ORAL at 11:32

## 2024-09-07 RX ADMIN — APIXABAN 5 MG: 5 TABLET, FILM COATED ORAL at 20:45

## 2024-09-07 RX ADMIN — IPRATROPIUM BROMIDE AND ALBUTEROL SULFATE 1 DOSE: 2.5; .5 SOLUTION RESPIRATORY (INHALATION) at 14:34

## 2024-09-07 RX ADMIN — IPRATROPIUM BROMIDE AND ALBUTEROL SULFATE 1 DOSE: 2.5; .5 SOLUTION RESPIRATORY (INHALATION) at 06:22

## 2024-09-07 ASSESSMENT — PAIN DESCRIPTION - DESCRIPTORS
DESCRIPTORS: THROBBING
DESCRIPTORS: THROBBING
DESCRIPTORS: ACHING

## 2024-09-07 ASSESSMENT — PAIN SCALES - GENERAL
PAINLEVEL_OUTOF10: 6
PAINLEVEL_OUTOF10: 3
PAINLEVEL_OUTOF10: 7
PAINLEVEL_OUTOF10: 6
PAINLEVEL_OUTOF10: 8
PAINLEVEL_OUTOF10: 7
PAINLEVEL_OUTOF10: 7
PAINLEVEL_OUTOF10: 8
PAINLEVEL_OUTOF10: 3
PAINLEVEL_OUTOF10: 7
PAINLEVEL_OUTOF10: 8

## 2024-09-07 ASSESSMENT — PAIN DESCRIPTION - ORIENTATION
ORIENTATION: LEFT

## 2024-09-07 ASSESSMENT — PAIN DESCRIPTION - LOCATION
LOCATION: INCISION
LOCATION: NECK
LOCATION: INCISION
LOCATION: NECK
LOCATION: NECK
LOCATION: INCISION
LOCATION: NECK
LOCATION: INCISION
LOCATION: NECK

## 2024-09-07 NOTE — PROGRESS NOTES
Radial arterial line removed, pressure held. Hemostasis achieved. Dressing placed. Pt tolerated well

## 2024-09-07 NOTE — PROGRESS NOTES
Direct oral anticoagulant (DOAC) - Pharmacy Review    Patient chart reviewed for indication and appropriateness of dose and therapy of Apixaban.:    Body mass index is 46.47 kg/m².  Wt Readings from Last 1 Encounters:   09/03/24 119 kg (262 lb 5.6 oz)     Some sources recommend that DOACs be avoided in weight < 50 or > 120 kg, or BMI > 40 whenever possible; however. some smaller studies suggest that DOACs may be safe and efficacious in this population.    Recent Labs     09/05/24 0247 09/06/24 0259 09/07/24  0315   CREATININE 0.9 0.7 0.6     Estimated Creatinine Clearance: 140 mL/min (based on SCr of 0.6 mg/dL).  Recent Labs     09/05/24 0247 09/06/24 0259 09/07/24  0315   HGB 10.9* 10.3* 10.0*   HCT 37.6 34.8* 33.3*    282 344     No results for input(s): \"INR\" in the last 72 hours.      ASSESSMENT     Indication: AFib.    Dose is appropriate for indication, age, and renal function: yes.    Taper dosing is ordered appropriately (if necessary): not applicable.    Start date/time is appropriate: yes.    Drug interactions (since admission) reviewed: No interactions/no new drug interactions identified requiring action.    Other anticoagulants on MAR: No concurrent anticoagulants ordered.      PLAN     No obvious intervention needed..    Electronically signed by Aixa Suazo RPh, BCPS, 9/7/2024,4:00 PM

## 2024-09-07 NOTE — PROGRESS NOTES
chloride flush, 5-40 mL, PRN  sodium chloride, , PRN  HYDROmorphone, 0.25 mg, Q3H PRN   Or  HYDROmorphone, 0.5 mg, Q3H PRN  metoprolol tartrate, 25 mg, Q12H PRN  hydrALAZINE, 10 mg, Q1H PRN  tiZANidine, 4 mg, Q6H PRN  glucose, 4 tablet, PRN  dextrose bolus, 125 mL, PRN   Or  dextrose bolus, 250 mL, PRN  glucagon (rDNA), 1 mg, PRN  dextrose, , Continuous PRN  butalbital-acetaminophen-caffeine, 1 tablet, Q6H PRN  magnesium sulfate, 1,000 mg, PRN  potassium chloride, 40 mEq, PRN   Or  potassium alternative oral replacement, 40 mEq, PRN   Or  potassium chloride, 10 mEq, PRN  sodium phosphate 19.05 mmol in sodium chloride 0.9 % 250 mL IVPB, 0.16 mmol/kg, PRN   Or  sodium phosphate 38.07 mmol in sodium chloride 0.9 % 250 mL IVPB, 0.32 mmol/kg, PRN  sodium chloride flush, 5-40 mL, PRN  ondansetron, 4 mg, Q6H PRN  polyethylene glycol, 17 g, Daily PRN  benzonatate, 100 mg, TID PRN  acetaminophen, 650 mg, Q4H PRN  HYDROcodone-acetaminophen, 1 tablet, Q4H PRN          PHYSICAL EXAM:     CONSTITUTIONAL: Alert and oriented times 3, no acute distress and cooperative to examination, speech and voice are normal.  HEENT: Normal  NECK: Soft, left neck wound c/d/i, without hematoma  NEUROLOGIC: BRAND 5/5 strength      LABS:       CBC:   Recent Labs     09/05/24  0247 09/06/24  0259 09/07/24  0315   WBC 13.8* 12.1* 14.8*   RBC 4.54 4.10* 4.04*   HGB 10.9* 10.3* 10.0*   HCT 37.6 34.8* 33.3*   MCV 82.8 84.9 82.4   MCH 24.0* 25.1* 24.8*   MCHC 29.0* 29.6* 30.0*   RDW 14.7* 14.7* 14.5    282 344   MPV 9.9 10.4 10.3                RADIOLOGY:                ASSESSMENT:     POD # 1 s/p left CEA for symptomatic left internal carotid artery stenosis (CVA prior to CEA)  Patient Active Problem List   Diagnosis    Hypertension    Hyperlipidemia    Pneumonia due to other specified bacteria (HCC)    Acute ischemic stroke (HCC)    Hypotension    Diarrhea    Diabetes (HCC)    Essential hypertension    Hemiplegia affecting dominant side (HCC)

## 2024-09-07 NOTE — PROGRESS NOTES
Patient:   Mary Lira  MR#:    [id number]   Room:    0148/148-01   YOB: 1975  Date of Progress Note: 9/7/2024  Time of Note                           8:03 AM  Consulting Physician:   Sandeep Diaz M.D.  Attending Physician:  Bashir Bhakta MD     Chief complaint Right-sided weakness/slurred speech/hypotension/hypoglycemia     S:This 48 y.o. female  with past medical history significant for atrial fibrillation not on anticoagulation, history of GI bleed, diabetes, CHF and hyperlipidemia is seen for evaluation of right-sided weakness and confusion.  On the day of admission the patient was last known well around 9 PM.  She went to sleep.  A few hours later the family awakened her when her Dexcom notified that her blood sugar was 30.  She was confused and disoriented when awoken.  She was found to have right-sided weakness and slurred speech.  She was brought in for evaluation.  She has been coughing for about 1 week along with some diarrhea.  She denies any previous history of stroke.  She is not on blood thinners.  CT of the head with no acute changes.  CT of the head and neck with no evidence of thrombus but with severe left carotid stenosis approximately approximately 80-90%.  She was transferred to the ICU and placed on pressors.  EKG with sinus rhythm.  Chest x-ray nodular opacities in the left upper lung.  Troponin borderline elevated.  Respiratory panel negative.  Serum creatinine elevated 1.2.  Discussions were held with ER physician approximately 4 hours and 15 minutes from time last known well.  Exam at that time revealed some drift in the right arm and right leg.  Movement improved on the right.  Atrial fibrillation noted.  Status post left carotid endarterectomy.  Doing well this morning without complaint.      REVIEW OF SYSTEMS:  Constitutional: No fevers No chills  Neck:No stiffness  Respiratory: No shortness of breath  Cardiovascular: No chest pain No palpitations  Gastrointestinal:  motion. Normal diastolic function.    Aortic Valve: Mildly calcified cusps.    Mitral Valve: Annular calcification. Mildly thickened leaflets. Mild stenosis noted. MV mean gradient is 4 mmHg.    Interatrial Septum: Agitated saline study was negative with and without provocation.    Image quality is adequate. Contrast used: Definity.       RECORD REVIEW: Previous medical records, medications were reviewed at today's visit    IMPRESSION:   Acute ischemic stroke-right-sided weakness/dysarthria  Patient was well beyond the 3-hour limit for IV tPA and risk-benefit did not favor giving tPA prior to 4 1/2 hours     Initial examination  Awake, alert, oriented, slightly slow  Right-sided facial droop  Minimal antigravity in the right arm  Drift of the right leg        CT of the head-no acute changes  CTA of the head and neck-80 to 90% stenosis in the left proximal carotid-most likely etiology of stroke with recent coughing issues  CT perfusion-left MCA ischemia       MRI of the brain personally reviewed-very small punctate infarcts left parietal left frontal region    Carotid ultrasound-severe bilateral internal carotid artery stenosis-varying density and ulcerative plaque in the right internal carotid-vascular input appreciated    Status post left carotid endarterectomy    2D echocardiogram with bubble study-left ventricular ejection fraction 55 to 60%/no PFO/normal left atrium      Place on aspirin/Plavix  Vascular consult appreciated-left carotid endarterectomy pending    If need to fully anticoagulated secondary to atrial fibrillation okay from neurostandpoint-would suggest single antiplatelet if fully anticoagulated     Hyperlipidemia-on statin-LDL 45  Diabetes-on insulin monitor blood sugar-check hemoglobin A1c  Mood disorder-on meds monitor  Hypotension-hold blood pressure medicines/avoid hypotension  CHF- monitor  Respiratory-DuoNebs  History of atrial fibrillation-monitor  History of GI

## 2024-09-07 NOTE — PROGRESS NOTES
4 Eyes Skin Assessment     NAME:  Mary Lira  YOB: 1975  MEDICAL RECORD NUMBER:  [id number]    The patient is being assessed for  Transfer to New Unit    I agree that at least one RN has performed a thorough Head to Toe Skin Assessment on the patient. ALL assessment sites listed below have been assessed.      Areas assessed by both nurses:    Shoulders, Back, Chest, Arms, Elbows, Hands, Sacrum. Buttock, Coccyx, Ischium, Legs. Feet and Heels, and Under Medical Devices         Does the Patient have a Wound? Yes wound(s) were present on assessment. LDA wound assessment was Initiated and completed by RN       Daryl Prevention initiated by RN: Yes  Wound Care Orders initiated by RN: Yes    Pressure Injury (Stage 3,4, Unstageable, DTI, NWPT, and Complex wounds) if present, place Wound referral order by RN under : No    New Ostomies, if present place, Ostomy referral order under : No     Nurse 1 eSignature: Electronically signed by ELISABET VASQUEZ LPN on 9/7/24 at 6:03 PM CDT    **SHARE this note so that the co-signing nurse can place an eSignature**    Nurse 2 eSignature: {Esignature:[id number]}

## 2024-09-07 NOTE — PROGRESS NOTES
Daily Progress Note    Date:2024  Patient: Mary Lira  : 1975  MRN:[id number]  CODE:Full Code No additional code details  PCP:Bruno Manning MD    Admit Date: 9/3/2024 12:28 AM   LOS: 4 days     Chief Complaint   Patient presents with    Seizures    Hypoglycemia           Altered Mental Status         Subjective: POD1 s/p left CEA. Pt seen while in ICU. Pain is currently tolerable, she has been receiving IV and PO opioids.         Hospital Summary: 47 yo F with T2DM, CHF, HTN, ESTEVAN who presented to [facility] ED with AMS, slurred speech, and right sided weakness.  CT head negative for acute abnormalities.  Admitted to hospitalist service for further management.   MRI brain showed 2 small foci of acute infarction within left parietal and left frontal lobe.   CTA neck showed ~90% stenosis of left ICA. Vascular surgery was consulted and considering carotid intervention.  Cardiology consulted for preoperative assessment. Echo showed EF 55-60% without regional wall motion abnormalities, planning for Lexiscan tomorrow.    Pt reported productive cough and dyspnea. CXR showed left sided opacities/nodules, CT recommended for further evaluation. Pt started on empiric Azithromycin and Ceftriaxone.   CT chest showed possible pulmonary edema, left lower lobe pneumonia, and multiple left sided pulmonary nodules with the largest measuring 1.1 cm. Pt will need repeat CT chest in 3 months for surveillance.     Developed Afib with RVR. Converted to sinus rhythm with IV Cardizem push.   Started on Eliquis for AC post-op. Plavix discontinued, but continue ASA.         Review of Systems   All other systems reviewed and are negative.      Objective:      Vital signs in last 24 hours:  Patient Vitals for the past 24 hrs:   BP Temp Temp src Pulse Resp SpO2   24 1200 (!) 128/56 99 °F (37.2 °C) Temporal 87 15 95 %   24 1115 -- -- -- 95 15 96 %   24 1100 -- -- -- 97 26 97 %   24 1030 -- -- -- 94 16 95 %  IntraVENous, Q6H PRN, Eliezer Palencia MD    polyethylene glycol (GLYCOLAX) packet 17 g, 17 g, Oral, Daily PRN, Eliezer Palencia MD    ipratropium 0.5 mg-albuterol 2.5 mg (DUONEB) nebulizer solution 1 Dose, 1 Dose, Inhalation, Q4H WA RT, Eliezer Palencia MD, 1 Dose at 09/07/24 0958    benzonatate (TESSALON) capsule 100 mg, 100 mg, Oral, TID PRN, Eliezer Palencia MD, 100 mg at 09/07/24 0505    cefTRIAXone (ROCEPHIN) 1,000 mg in sterile water 10 mL IV syringe, 1,000 mg, IntraVENous, Q24H, 1,000 mg at 09/06/24 2333 **AND** [COMPLETED] azithromycin (ZITHROMAX) 500 mg in sodium chloride 0.9 % 250 mL IVPB ([address]), 500 mg, IntraVENous, Q24H, Howie Matute MD, Stopped at 09/06/24 0223    insulin lispro (HUMALOG,ADMELOG) injection vial 0-4 Units, 0-4 Units, SubCUTAneous, TID WC, Eliezer Palencia MD, 2 Units at 09/06/24 1759    insulin lispro (HUMALOG,ADMELOG) injection vial 0-4 Units, 0-4 Units, SubCUTAneous, Nightly, Eliezer Palencia MD    atorvastatin (LIPITOR) tablet 40 mg, 40 mg, Oral, Nightly, Eliezer Palencia MD, 40 mg at 09/06/24 1922    busPIRone (BUSPAR) tablet 5 mg, 5 mg, Oral, Daily, Eliezer Palencia MD, 5 mg at 09/07/24 0806    sertraline (ZOLOFT) tablet 100 mg, 100 mg, Oral, Daily, Eliezer Palencia MD, 100 mg at 09/07/24 0806    traZODone (DESYREL) tablet 100 mg, 100 mg, Oral, Nightly, Eliezer Palencia MD, 100 mg at 09/06/24 1922    acetaminophen (TYLENOL) tablet 650 mg, 650 mg, Oral, Q4H PRN, Eliezer Palencia MD, 650 mg at 09/03/24 1247    clopidogrel (PLAVIX) tablet 75 mg, 75 mg, Oral, Daily, Eliezer Palencia MD, 75 mg at 09/07/24 0805    HYDROcodone-acetaminophen (NORCO) 7.5-325 MG per tablet 1 tablet, 1 tablet, Oral, Q4H PRN, Eliezer Palencia MD, 1 tablet at 09/07/24 1223      Imaging:  CT CHEST WO CONTRAST    Result Date: 9/4/2024  Impression: Interstitial pulmonary edema. Scattered tree in bud nodules in both lungs and consolidative opacity in the left lower lobe suspicious for

## 2024-09-07 NOTE — PROGRESS NOTES
[facility] bed.    Explained reason for transfer to Patient and Family.   Belongings: Glasses with patient at bedside .   Soft chart transferred with patient: Yes.  Telemetry box number SP-1 transferred with patient: yes.    Electronically signed by ELISABET VASQUEZ LPN on 9/7/2024 at 6:01 PM

## 2024-09-08 LAB
ANION GAP SERPL CALCULATED.3IONS-SCNC: 11 MMOL/L (ref 7–19)
BACTERIA BLD CULT ORG #2: NORMAL
BASOPHILS # BLD: 0.1 K/UL (ref 0–0.2)
BASOPHILS NFR BLD: 0.6 % (ref 0–1)
BUN SERPL-MCNC: 23 MG/DL (ref 6–20)
CALCIUM SERPL-MCNC: 8.5 MG/DL (ref 8.6–10)
CHLORIDE SERPL-SCNC: 101 MMOL/L (ref 98–111)
CO2 SERPL-SCNC: 26 MMOL/L (ref 22–29)
CREAT SERPL-MCNC: 0.6 MG/DL (ref 0.5–0.9)
EOSINOPHIL # BLD: 0.3 K/UL (ref 0–0.6)
EOSINOPHIL NFR BLD: 2.1 % (ref 0–5)
ERYTHROCYTE [DISTWIDTH] IN BLOOD BY AUTOMATED COUNT: 14.8 % (ref 11.5–14.5)
GLUCOSE BLD-MCNC: 176 MG/DL (ref 70–99)
GLUCOSE BLD-MCNC: 207 MG/DL (ref 70–99)
GLUCOSE BLD-MCNC: 308 MG/DL (ref 70–99)
GLUCOSE BLD-MCNC: 312 MG/DL (ref 70–99)
GLUCOSE SERPL-MCNC: 165 MG/DL (ref 70–99)
HCT VFR BLD AUTO: 30 % (ref 37–47)
HGB BLD-MCNC: 9.1 G/DL (ref 12–16)
IMM GRANULOCYTES # BLD: 0.1 K/UL
LYMPHOCYTES # BLD: 3.2 K/UL (ref 1.1–4.5)
LYMPHOCYTES NFR BLD: 23.2 % (ref 20–40)
MCH RBC QN AUTO: 24.6 PG (ref 27–31)
MCHC RBC AUTO-ENTMCNC: 30.3 G/DL (ref 33–37)
MCV RBC AUTO: 81.1 FL (ref 81–99)
MONOCYTES # BLD: 1 K/UL (ref 0–0.9)
MONOCYTES NFR BLD: 7.3 % (ref 0–10)
NEUTROPHILS # BLD: 9.2 K/UL (ref 1.5–7.5)
NEUTS SEG NFR BLD: 66.2 % (ref 50–65)
PERFORMED ON: ABNORMAL
PLATELET # BLD AUTO: 316 K/UL (ref 130–400)
PMV BLD AUTO: 10 FL (ref 9.4–12.3)
POTASSIUM SERPL-SCNC: 4.6 MMOL/L (ref 3.5–5)
RBC # BLD AUTO: 3.7 M/UL (ref 4.2–5.4)
SODIUM SERPL-SCNC: 138 MMOL/L (ref 136–145)
WBC # BLD AUTO: 14 K/UL (ref 4.8–10.8)

## 2024-09-08 PROCEDURE — 6370000000 HC RX 637 (ALT 250 FOR IP): Performed by: SURGERY

## 2024-09-08 PROCEDURE — 1200000000 HC SEMI PRIVATE

## 2024-09-08 PROCEDURE — 2580000003 HC RX 258: Performed by: SURGERY

## 2024-09-08 PROCEDURE — 2700000000 HC OXYGEN THERAPY PER DAY

## 2024-09-08 PROCEDURE — 80048 BASIC METABOLIC PNL TOTAL CA: CPT

## 2024-09-08 PROCEDURE — 85025 COMPLETE CBC W/AUTO DIFF WBC: CPT

## 2024-09-08 PROCEDURE — 6360000002 HC RX W HCPCS: Performed by: SURGERY

## 2024-09-08 PROCEDURE — 94760 N-INVAS EAR/PLS OXIMETRY 1: CPT

## 2024-09-08 PROCEDURE — 99233 SBSQ HOSP IP/OBS HIGH 50: CPT | Performed by: PSYCHIATRY & NEUROLOGY

## 2024-09-08 PROCEDURE — 36415 COLL VENOUS BLD VENIPUNCTURE: CPT

## 2024-09-08 PROCEDURE — 6370000000 HC RX 637 (ALT 250 FOR IP): Performed by: STUDENT IN AN ORGANIZED HEALTH CARE EDUCATION/TRAINING PROGRAM

## 2024-09-08 PROCEDURE — 2500000003 HC RX 250 WO HCPCS: Performed by: HOSPITALIST

## 2024-09-08 PROCEDURE — 82962 GLUCOSE BLOOD TEST: CPT

## 2024-09-08 PROCEDURE — 94640 AIRWAY INHALATION TREATMENT: CPT

## 2024-09-08 RX ORDER — METOPROLOL TARTRATE 1 MG/ML
5 INJECTION, SOLUTION INTRAVENOUS ONCE
Status: COMPLETED | OUTPATIENT
Start: 2024-09-08 | End: 2024-09-08

## 2024-09-08 RX ORDER — FUROSEMIDE 20 MG
20 TABLET ORAL DAILY
Status: DISCONTINUED | OUTPATIENT
Start: 2024-09-08 | End: 2024-09-12 | Stop reason: HOSPADM

## 2024-09-08 RX ADMIN — FUROSEMIDE 20 MG: 20 TABLET ORAL at 10:03

## 2024-09-08 RX ADMIN — SODIUM CHLORIDE, PRESERVATIVE FREE 10 ML: 5 INJECTION INTRAVENOUS at 08:57

## 2024-09-08 RX ADMIN — SODIUM ZIRCONIUM CYCLOSILICATE 10 G: 10 POWDER, FOR SUSPENSION ORAL at 08:57

## 2024-09-08 RX ADMIN — INSULIN LISPRO 4 UNITS: 100 INJECTION, SOLUTION INTRAVENOUS; SUBCUTANEOUS at 20:18

## 2024-09-08 RX ADMIN — SODIUM CHLORIDE, PRESERVATIVE FREE 10 ML: 5 INJECTION INTRAVENOUS at 20:18

## 2024-09-08 RX ADMIN — BUSPIRONE HYDROCHLORIDE 5 MG: 5 TABLET ORAL at 08:59

## 2024-09-08 RX ADMIN — HYDROCODONE BITARTRATE AND ACETAMINOPHEN 1 TABLET: 7.5; 325 TABLET ORAL at 10:05

## 2024-09-08 RX ADMIN — IPRATROPIUM BROMIDE AND ALBUTEROL SULFATE 1 DOSE: 2.5; .5 SOLUTION RESPIRATORY (INHALATION) at 19:05

## 2024-09-08 RX ADMIN — GABAPENTIN 300 MG: 300 CAPSULE ORAL at 08:58

## 2024-09-08 RX ADMIN — METOPROLOL SUCCINATE 25 MG: 25 TABLET, EXTENDED RELEASE ORAL at 08:59

## 2024-09-08 RX ADMIN — HYDROMORPHONE HYDROCHLORIDE 0.5 MG: 1 INJECTION, SOLUTION INTRAMUSCULAR; INTRAVENOUS; SUBCUTANEOUS at 13:17

## 2024-09-08 RX ADMIN — METOPROLOL TARTRATE 5 MG: 5 INJECTION INTRAVENOUS at 21:03

## 2024-09-08 RX ADMIN — HYDROMORPHONE HYDROCHLORIDE 0.5 MG: 1 INJECTION, SOLUTION INTRAMUSCULAR; INTRAVENOUS; SUBCUTANEOUS at 23:08

## 2024-09-08 RX ADMIN — GABAPENTIN 300 MG: 300 CAPSULE ORAL at 13:17

## 2024-09-08 RX ADMIN — IPRATROPIUM BROMIDE AND ALBUTEROL SULFATE 1 DOSE: 2.5; .5 SOLUTION RESPIRATORY (INHALATION) at 14:06

## 2024-09-08 RX ADMIN — WATER 1000 MG: 1 INJECTION INTRAMUSCULAR; INTRAVENOUS; SUBCUTANEOUS at 23:08

## 2024-09-08 RX ADMIN — ASPIRIN 81 MG CHEWABLE TABLET 81 MG: 81 TABLET CHEWABLE at 08:59

## 2024-09-08 RX ADMIN — HYDROCODONE BITARTRATE AND ACETAMINOPHEN 1 TABLET: 7.5; 325 TABLET ORAL at 20:13

## 2024-09-08 RX ADMIN — TRAZODONE HYDROCHLORIDE 100 MG: 100 TABLET ORAL at 20:13

## 2024-09-08 RX ADMIN — INSULIN LISPRO 3 UNITS: 100 INJECTION, SOLUTION INTRAVENOUS; SUBCUTANEOUS at 17:06

## 2024-09-08 RX ADMIN — HYDROMORPHONE HYDROCHLORIDE 0.5 MG: 1 INJECTION, SOLUTION INTRAMUSCULAR; INTRAVENOUS; SUBCUTANEOUS at 17:52

## 2024-09-08 RX ADMIN — DILTIAZEM HYDROCHLORIDE 300 MG: 180 CAPSULE, COATED, EXTENDED RELEASE ORAL at 08:58

## 2024-09-08 RX ADMIN — APIXABAN 5 MG: 5 TABLET, FILM COATED ORAL at 20:17

## 2024-09-08 RX ADMIN — INSULIN LISPRO 1 UNITS: 100 INJECTION, SOLUTION INTRAVENOUS; SUBCUTANEOUS at 12:00

## 2024-09-08 RX ADMIN — INSULIN GLARGINE 10 UNITS: 100 INJECTION, SOLUTION SUBCUTANEOUS at 08:58

## 2024-09-08 RX ADMIN — GABAPENTIN 300 MG: 300 CAPSULE ORAL at 20:13

## 2024-09-08 RX ADMIN — HYDROMORPHONE HYDROCHLORIDE 0.5 MG: 1 INJECTION, SOLUTION INTRAMUSCULAR; INTRAVENOUS; SUBCUTANEOUS at 05:52

## 2024-09-08 RX ADMIN — SERTRALINE HYDROCHLORIDE 100 MG: 100 TABLET ORAL at 08:58

## 2024-09-08 RX ADMIN — IPRATROPIUM BROMIDE AND ALBUTEROL SULFATE 1 DOSE: 2.5; .5 SOLUTION RESPIRATORY (INHALATION) at 06:37

## 2024-09-08 RX ADMIN — ATORVASTATIN CALCIUM 40 MG: 40 TABLET, FILM COATED ORAL at 20:12

## 2024-09-08 RX ADMIN — IPRATROPIUM BROMIDE AND ALBUTEROL SULFATE 1 DOSE: 2.5; .5 SOLUTION RESPIRATORY (INHALATION) at 10:17

## 2024-09-08 RX ADMIN — HYDROCODONE BITARTRATE AND ACETAMINOPHEN 1 TABLET: 7.5; 325 TABLET ORAL at 14:58

## 2024-09-08 RX ADMIN — HYDROMORPHONE HYDROCHLORIDE 0.5 MG: 1 INJECTION, SOLUTION INTRAMUSCULAR; INTRAVENOUS; SUBCUTANEOUS at 08:56

## 2024-09-08 RX ADMIN — APIXABAN 5 MG: 5 TABLET, FILM COATED ORAL at 08:58

## 2024-09-08 ASSESSMENT — PAIN DESCRIPTION - ORIENTATION
ORIENTATION: LEFT

## 2024-09-08 ASSESSMENT — PAIN DESCRIPTION - DESCRIPTORS: DESCRIPTORS: THROBBING

## 2024-09-08 ASSESSMENT — PAIN DESCRIPTION - LOCATION
LOCATION: NECK

## 2024-09-08 ASSESSMENT — PAIN SCALES - GENERAL
PAINLEVEL_OUTOF10: 9
PAINLEVEL_OUTOF10: 9
PAINLEVEL_OUTOF10: 7
PAINLEVEL_OUTOF10: 8
PAINLEVEL_OUTOF10: 7
PAINLEVEL_OUTOF10: 7

## 2024-09-08 NOTE — PLAN OF CARE
Problem: Pain  Goal: Verbalizes/displays adequate comfort level or baseline comfort level  9/7/2024 2250 by Sadiq Landry RN  Outcome: Progressing  9/7/2024 1723 by Asmita Sequeira LPN  Outcome: Progressing     Problem: Safety - Adult  Goal: Free from fall injury  9/7/2024 2250 by Sadiq Landry RN  Outcome: Progressing  9/7/2024 1723 by Asmita Sequeira LPN  Outcome: Progressing     Problem: Skin/Tissue Integrity  Goal: Absence of new skin breakdown  Description: 1.  Monitor for areas of redness and/or skin breakdown  2.  Assess vascular access sites hourly  3.  Every 4-6 hours minimum:  Change oxygen saturation probe site  4.  Every 4-6 hours:  If on nasal continuous positive airway pressure, respiratory therapy assess nares and determine need for appliance change or resting period.  9/7/2024 2250 by Sadiq Landry RN  Outcome: Progressing  9/7/2024 1723 by Asmita Sequeira LPN  Outcome: Progressing     Problem: Chronic Conditions and Co-morbidities  Goal: Patient's chronic conditions and co-morbidity symptoms are monitored and maintained or improved  9/7/2024 2250 by Sadiq Landry RN  Outcome: Progressing  9/7/2024 1723 by Asmita Sequeira LPN  Outcome: Progressing

## 2024-09-08 NOTE — PROGRESS NOTES
Patient:   Mary Lira  MR#:    [id number]   Room:    0434/434-01   YOB: 1975  Date of Progress Note: 9/8/2024  Time of Note                           8:07 AM  Consulting Physician:   Sandeep Diaz M.D.  Attending Physician:  Bashir Bhakta MD     Chief complaint Right-sided weakness/slurred speech/hypotension/hypoglycemia     S:This 48 y.o. female  with past medical history significant for atrial fibrillation not on anticoagulation, history of GI bleed, diabetes, CHF and hyperlipidemia is seen for evaluation of right-sided weakness and confusion.  On the day of admission the patient was last known well around 9 PM.  She went to sleep.  A few hours later the family awakened her when her Dexcom notified that her blood sugar was 30.  She was confused and disoriented when awoken.  She was found to have right-sided weakness and slurred speech.  She was brought in for evaluation.  She has been coughing for about 1 week along with some diarrhea.  She denies any previous history of stroke.  She is not on blood thinners.  CT of the head with no acute changes.  CT of the head and neck with no evidence of thrombus but with severe left carotid stenosis approximately approximately 80-90%.  She was transferred to the ICU and placed on pressors.  EKG with sinus rhythm.  Chest x-ray nodular opacities in the left upper lung.  Troponin borderline elevated.  Respiratory panel negative.  Serum creatinine elevated 1.2.  Discussions were held with ER physician approximately 4 hours and 15 minutes from time last known well.  Exam at that time revealed some drift in the right arm and right leg.  Movement improved on the right.  Atrial fibrillation noted.  Status post left carotid endarterectomy.  No new complaints.  Feeling well this morning.      REVIEW OF SYSTEMS:  Constitutional: No fevers No chills  Neck:No stiffness  Respiratory: No shortness of breath  Cardiovascular: No chest pain No

## 2024-09-08 NOTE — PLAN OF CARE
Problem: Discharge Planning  Goal: Discharge to home or other facility with appropriate resources  Outcome: Progressing     Problem: Pain  Goal: Verbalizes/displays adequate comfort level or baseline comfort level  9/8/2024 1024 by Mary Tovar RN  Outcome: Progressing  9/7/2024 2250 by Sadiq Landry RN  Outcome: Progressing     Problem: Safety - Adult  Goal: Free from fall injury  9/8/2024 1024 by Mary Tovar RN  Outcome: Progressing  9/7/2024 2250 by Sadiq Landry RN  Outcome: Progressing     Problem: Skin/Tissue Integrity  Goal: Absence of new skin breakdown  Description: 1.  Monitor for areas of redness and/or skin breakdown  2.  Assess vascular access sites hourly  3.  Every 4-6 hours minimum:  Change oxygen saturation probe site  4.  Every 4-6 hours:  If on nasal continuous positive airway pressure, respiratory therapy assess nares and determine need for appliance change or resting period.  9/8/2024 1024 by Mary Tovar RN  Outcome: Progressing  9/7/2024 2250 by Sadiq Landry RN  Outcome: Progressing     Problem: Chronic Conditions and Co-morbidities  Goal: Patient's chronic conditions and co-morbidity symptoms are monitored and maintained or improved  9/8/2024 1024 by Mary Tovar RN  Outcome: Progressing  9/7/2024 2250 by Sadiq Landry RN  Outcome: Progressing

## 2024-09-08 NOTE — PROGRESS NOTES
1247    HYDROcodone-acetaminophen (NORCO) 7.5-325 MG per tablet 1 tablet, 1 tablet, Oral, Q4H PRN, Eliezer Palencia MD, 1 tablet at 09/08/24 1005      Imaging:  CT CHEST WO CONTRAST    Result Date: 9/4/2024  Impression: Interstitial pulmonary edema. Scattered tree in bud nodules in both lungs and consolidative opacity in the left lower lobe suspicious for pneumonia. Multiple pulmonary nodules in the left lung, largest measuring 1.1 cm. Recommend follow-up CT chest in 3 months. 1.2 cm enlarged precarinal lymph node, nonspecific.  Attention on follow-up CT chest.  All CT scans are performed using dose optimization techniques as appropriate to the performed exam and include at least one of the following: Automated exposure control, adjustment of the mA and/or kV according to size, and the use of iterative reconstruction technique.  ______________________________________ Electronically signed by: ALEX HALE D.O. Date:     09/04/2024 Time:    11:31     MRI brain without contrast    Result Date: 9/3/2024  There are two small foci of acute cerebral infarction seen within the cortex of the left parietal lobe and within the deep white matter of the left frontal lobe.  Mild chronic small vessel ischemic changes seen within the supratentorial white matter.  Critical result:  The findings were communicated to the patients nurse at 1:14 p.m. 09/03/2024.   ______________________________________ Electronically signed by: AALIYAH ORR M.D. Date:     09/03/2024 Time:    13:07     CT Brain Perfusion    Result Date: 9/3/2024   Elevated mean transit time in the left MCA territory corresponding to ischemia.  No core infarct.  Recommend consultation with neuro interventional service for possible catheter angiography.  Critical findings discussed with  ICU Nurse Vilma by Dr. Hale via telephone on 7:45 a.m. on 09/03/2024. Vilma indicated that neurology service is aware of these findings.  All CT scans are performed using dose  optimization techniques as appropriate to the performed exam and include at least one of the following: Automated exposure control, adjustment of the mA and/or kV according to size, and the use of iterative reconstruction technique.  ______________________________________ Electronically signed by: ALEX HALE D.O. Date:     09/03/2024 Time:    07:37     XR CHEST PORTABLE    Result Date: 9/3/2024   Nodule like opacities in the left upper lung.  Recommend follow-up chest CT.  Calcified atherosclerosis.     ______________________________________ Electronically signed by: ARNOLD HARRISON M.D. Date:     09/03/2024 Time:    01:52     CTA HEAD NECK W CONTRAST    Result Date: 9/3/2024   No large vessel occlusion.  Heavy atherosclerotic plaque at the carotid bifurcations.   80 - 90% stenosis of the proximal left internal carotid artery.  60% stenosis of the proximal right internal carotid artery.  Left upper lobe nodules measuring 1.3 and 1.1 cm.  Recommend further evaluation with chest CT  All CT scans are performed using dose optimization techniques as appropriate to the performed exam and include at least one of the following: Automated exposure control, adjustment of the mA and/or kV according to size, and the use of iterative reconstruction technique.  ______________________________________ Electronically signed by: ARNOLD HARRISON M.D. Date:     09/03/2024 Time:    01:28     CT HEAD WO CONTRAST    Result Date: 9/3/2024  1.  CT of the brain within normal limits.  Discussed with ordering physician 0054 hours Central time 09/03/2024.  All CT scans are performed using dose optimization techniques as appropriate to the performed exam and include at least one of the following: Automated exposure control, adjustment of the mA and/or kV according to size, and the use of iterative reconstruction technique.  ______________________________________ Electronically signed by: JOANNA MACK M.D. Date:     09/03/2024 Time:

## 2024-09-09 LAB
ANION GAP SERPL CALCULATED.3IONS-SCNC: 11 MMOL/L (ref 7–19)
BASOPHILS # BLD: 0.1 K/UL (ref 0–0.2)
BASOPHILS NFR BLD: 0.7 % (ref 0–1)
BUN SERPL-MCNC: 21 MG/DL (ref 6–20)
CALCIUM SERPL-MCNC: 8.8 MG/DL (ref 8.6–10)
CHLORIDE SERPL-SCNC: 100 MMOL/L (ref 98–111)
CO2 SERPL-SCNC: 26 MMOL/L (ref 22–29)
CREAT SERPL-MCNC: 0.7 MG/DL (ref 0.5–0.9)
EOSINOPHIL # BLD: 0.8 K/UL (ref 0–0.6)
EOSINOPHIL NFR BLD: 6.3 % (ref 0–5)
ERYTHROCYTE [DISTWIDTH] IN BLOOD BY AUTOMATED COUNT: 14.8 % (ref 11.5–14.5)
GLUCOSE BLD-MCNC: 224 MG/DL (ref 70–99)
GLUCOSE BLD-MCNC: 253 MG/DL (ref 70–99)
GLUCOSE BLD-MCNC: 281 MG/DL (ref 70–99)
GLUCOSE BLD-MCNC: 283 MG/DL (ref 70–99)
GLUCOSE SERPL-MCNC: 215 MG/DL (ref 70–99)
HCT VFR BLD AUTO: 34.1 % (ref 37–47)
HGB BLD-MCNC: 9.9 G/DL (ref 12–16)
IMM GRANULOCYTES # BLD: 0 K/UL
LYMPHOCYTES # BLD: 3.4 K/UL (ref 1.1–4.5)
LYMPHOCYTES NFR BLD: 27.9 % (ref 20–40)
MCH RBC QN AUTO: 24.1 PG (ref 27–31)
MCHC RBC AUTO-ENTMCNC: 29 G/DL (ref 33–37)
MCV RBC AUTO: 83 FL (ref 81–99)
MONOCYTES # BLD: 1.1 K/UL (ref 0–0.9)
MONOCYTES NFR BLD: 9 % (ref 0–10)
NEUTROPHILS # BLD: 6.8 K/UL (ref 1.5–7.5)
NEUTS SEG NFR BLD: 55.8 % (ref 50–65)
PERFORMED ON: ABNORMAL
PLATELET # BLD AUTO: 314 K/UL (ref 130–400)
PMV BLD AUTO: 10.1 FL (ref 9.4–12.3)
POTASSIUM SERPL-SCNC: 4.6 MMOL/L (ref 3.5–5)
RBC # BLD AUTO: 4.11 M/UL (ref 4.2–5.4)
SODIUM SERPL-SCNC: 137 MMOL/L (ref 136–145)
WBC # BLD AUTO: 12.3 K/UL (ref 4.8–10.8)

## 2024-09-09 PROCEDURE — 6370000000 HC RX 637 (ALT 250 FOR IP): Performed by: SURGERY

## 2024-09-09 PROCEDURE — 97110 THERAPEUTIC EXERCISES: CPT

## 2024-09-09 PROCEDURE — 1200000000 HC SEMI PRIVATE

## 2024-09-09 PROCEDURE — 99233 SBSQ HOSP IP/OBS HIGH 50: CPT | Performed by: PSYCHIATRY & NEUROLOGY

## 2024-09-09 PROCEDURE — 94150 VITAL CAPACITY TEST: CPT

## 2024-09-09 PROCEDURE — 36415 COLL VENOUS BLD VENIPUNCTURE: CPT

## 2024-09-09 PROCEDURE — 80048 BASIC METABOLIC PNL TOTAL CA: CPT

## 2024-09-09 PROCEDURE — 94760 N-INVAS EAR/PLS OXIMETRY 1: CPT

## 2024-09-09 PROCEDURE — 97535 SELF CARE MNGMENT TRAINING: CPT

## 2024-09-09 PROCEDURE — 2580000003 HC RX 258: Performed by: SURGERY

## 2024-09-09 PROCEDURE — 2700000000 HC OXYGEN THERAPY PER DAY

## 2024-09-09 PROCEDURE — 82962 GLUCOSE BLOOD TEST: CPT

## 2024-09-09 PROCEDURE — 97116 GAIT TRAINING THERAPY: CPT

## 2024-09-09 PROCEDURE — 94640 AIRWAY INHALATION TREATMENT: CPT

## 2024-09-09 PROCEDURE — 6370000000 HC RX 637 (ALT 250 FOR IP): Performed by: STUDENT IN AN ORGANIZED HEALTH CARE EDUCATION/TRAINING PROGRAM

## 2024-09-09 PROCEDURE — 85025 COMPLETE CBC W/AUTO DIFF WBC: CPT

## 2024-09-09 PROCEDURE — 97530 THERAPEUTIC ACTIVITIES: CPT

## 2024-09-09 PROCEDURE — 6360000002 HC RX W HCPCS: Performed by: SURGERY

## 2024-09-09 RX ORDER — INSULIN GLARGINE 100 [IU]/ML
20 INJECTION, SOLUTION SUBCUTANEOUS DAILY
Status: DISCONTINUED | OUTPATIENT
Start: 2024-09-10 | End: 2024-09-11

## 2024-09-09 RX ORDER — INSULIN GLARGINE 100 [IU]/ML
10 INJECTION, SOLUTION SUBCUTANEOUS ONCE
Status: COMPLETED | OUTPATIENT
Start: 2024-09-09 | End: 2024-09-09

## 2024-09-09 RX ADMIN — INSULIN GLARGINE 10 UNITS: 100 INJECTION, SOLUTION SUBCUTANEOUS at 12:40

## 2024-09-09 RX ADMIN — GABAPENTIN 300 MG: 300 CAPSULE ORAL at 20:43

## 2024-09-09 RX ADMIN — GABAPENTIN 300 MG: 300 CAPSULE ORAL at 14:37

## 2024-09-09 RX ADMIN — TRAZODONE HYDROCHLORIDE 100 MG: 100 TABLET ORAL at 20:43

## 2024-09-09 RX ADMIN — TIZANIDINE 4 MG: 4 TABLET ORAL at 20:46

## 2024-09-09 RX ADMIN — SODIUM CHLORIDE, PRESERVATIVE FREE 10 ML: 5 INJECTION INTRAVENOUS at 09:14

## 2024-09-09 RX ADMIN — SODIUM CHLORIDE, PRESERVATIVE FREE 10 ML: 5 INJECTION INTRAVENOUS at 20:44

## 2024-09-09 RX ADMIN — DILTIAZEM HYDROCHLORIDE 300 MG: 180 CAPSULE, COATED, EXTENDED RELEASE ORAL at 07:40

## 2024-09-09 RX ADMIN — HYDROMORPHONE HYDROCHLORIDE 0.5 MG: 1 INJECTION, SOLUTION INTRAMUSCULAR; INTRAVENOUS; SUBCUTANEOUS at 03:32

## 2024-09-09 RX ADMIN — ASPIRIN 81 MG CHEWABLE TABLET 81 MG: 81 TABLET CHEWABLE at 07:41

## 2024-09-09 RX ADMIN — IPRATROPIUM BROMIDE AND ALBUTEROL SULFATE 1 DOSE: 2.5; .5 SOLUTION RESPIRATORY (INHALATION) at 10:30

## 2024-09-09 RX ADMIN — INSULIN LISPRO 2 UNITS: 100 INJECTION, SOLUTION INTRAVENOUS; SUBCUTANEOUS at 12:40

## 2024-09-09 RX ADMIN — GABAPENTIN 300 MG: 300 CAPSULE ORAL at 07:41

## 2024-09-09 RX ADMIN — INSULIN LISPRO 1 UNITS: 100 INJECTION, SOLUTION INTRAVENOUS; SUBCUTANEOUS at 09:13

## 2024-09-09 RX ADMIN — INSULIN LISPRO 2 UNITS: 100 INJECTION, SOLUTION INTRAVENOUS; SUBCUTANEOUS at 16:59

## 2024-09-09 RX ADMIN — FUROSEMIDE 20 MG: 20 TABLET ORAL at 07:40

## 2024-09-09 RX ADMIN — IPRATROPIUM BROMIDE AND ALBUTEROL SULFATE 1 DOSE: 2.5; .5 SOLUTION RESPIRATORY (INHALATION) at 20:07

## 2024-09-09 RX ADMIN — SERTRALINE HYDROCHLORIDE 100 MG: 100 TABLET ORAL at 07:41

## 2024-09-09 RX ADMIN — IPRATROPIUM BROMIDE AND ALBUTEROL SULFATE 1 DOSE: 2.5; .5 SOLUTION RESPIRATORY (INHALATION) at 14:19

## 2024-09-09 RX ADMIN — HYDROCODONE BITARTRATE AND ACETAMINOPHEN 1 TABLET: 7.5; 325 TABLET ORAL at 07:46

## 2024-09-09 RX ADMIN — BUSPIRONE HYDROCHLORIDE 5 MG: 5 TABLET ORAL at 07:40

## 2024-09-09 RX ADMIN — HYDROMORPHONE HYDROCHLORIDE 0.5 MG: 1 INJECTION, SOLUTION INTRAMUSCULAR; INTRAVENOUS; SUBCUTANEOUS at 09:22

## 2024-09-09 RX ADMIN — INSULIN GLARGINE 10 UNITS: 100 INJECTION, SOLUTION SUBCUTANEOUS at 09:13

## 2024-09-09 RX ADMIN — ATORVASTATIN CALCIUM 40 MG: 40 TABLET, FILM COATED ORAL at 20:44

## 2024-09-09 RX ADMIN — APIXABAN 5 MG: 5 TABLET, FILM COATED ORAL at 20:43

## 2024-09-09 RX ADMIN — HYDROMORPHONE HYDROCHLORIDE 0.5 MG: 1 INJECTION, SOLUTION INTRAMUSCULAR; INTRAVENOUS; SUBCUTANEOUS at 16:59

## 2024-09-09 RX ADMIN — HYDROCODONE BITARTRATE AND ACETAMINOPHEN 1 TABLET: 7.5; 325 TABLET ORAL at 14:37

## 2024-09-09 RX ADMIN — IPRATROPIUM BROMIDE AND ALBUTEROL SULFATE 1 DOSE: 2.5; .5 SOLUTION RESPIRATORY (INHALATION) at 06:13

## 2024-09-09 RX ADMIN — METOPROLOL SUCCINATE 25 MG: 25 TABLET, EXTENDED RELEASE ORAL at 07:41

## 2024-09-09 RX ADMIN — METOPROLOL TARTRATE 25 MG: 25 TABLET, FILM COATED ORAL at 21:17

## 2024-09-09 RX ADMIN — HYDROCODONE BITARTRATE AND ACETAMINOPHEN 1 TABLET: 7.5; 325 TABLET ORAL at 20:43

## 2024-09-09 RX ADMIN — HYDROMORPHONE HYDROCHLORIDE 0.5 MG: 1 INJECTION, SOLUTION INTRAMUSCULAR; INTRAVENOUS; SUBCUTANEOUS at 23:02

## 2024-09-09 RX ADMIN — APIXABAN 5 MG: 5 TABLET, FILM COATED ORAL at 07:40

## 2024-09-09 ASSESSMENT — PAIN DESCRIPTION - LOCATION
LOCATION: NECK

## 2024-09-09 ASSESSMENT — PAIN SCALES - GENERAL
PAINLEVEL_OUTOF10: 8
PAINLEVEL_OUTOF10: 9
PAINLEVEL_OUTOF10: 8
PAINLEVEL_OUTOF10: 8
PAINLEVEL_OUTOF10: 9
PAINLEVEL_OUTOF10: 8

## 2024-09-09 ASSESSMENT — PAIN DESCRIPTION - DESCRIPTORS
DESCRIPTORS: THROBBING
DESCRIPTORS: SHARP;STABBING

## 2024-09-09 ASSESSMENT — PAIN DESCRIPTION - ORIENTATION: ORIENTATION: LEFT

## 2024-09-09 NOTE — PROGRESS NOTES
Daily Progress Note    Date:2024  Patient: Mary Lira  : 1975  MRN:[id number]  CODE:Full Code No additional code details  PCP:Bruno Manning MD    Admit Date: 9/3/2024 12:28 AM   LOS: 6 days     Chief Complaint   Patient presents with    Seizures    Hypoglycemia           Altered Mental Status         Subjective: NAEON. Pt endorses left sided ear/neck pain. She worked with PT today.         Hospital Summary: 49 yo F with T2DM, CHF, HTN, ESTEVAN who presented to [facility] ED with AMS, slurred speech, and right sided weakness.  CT head negative for acute abnormalities.  Admitted to hospitalist service for further management.   MRI brain showed 2 small foci of acute infarction within left parietal and left frontal lobe.   CTA neck showed ~90% stenosis of left ICA. Vascular surgery was consulted and considering carotid intervention.  Cardiology consulted for preoperative assessment. Echo showed EF 55-60% without regional wall motion abnormalities, planning for Lexiscan tomorrow.    Pt reported productive cough and dyspnea. CXR showed left sided opacities/nodules, CT recommended for further evaluation. Pt started on empiric Azithromycin and Ceftriaxone.   CT chest showed possible pulmonary edema, left lower lobe pneumonia, and multiple left sided pulmonary nodules with the largest measuring 1.1 cm. Pt will need repeat CT chest in 3 months for surveillance.     Developed Afib with RVR. Converted to sinus rhythm with IV Cardizem push.   Started on Eliquis for AC post-op. Plavix discontinued, but continue ASA.         Review of Systems   All other systems reviewed and are negative.      Objective:      Vital signs in last 24 hours:  Patient Vitals for the past 24 hrs:   BP Temp Temp src Pulse Resp SpO2   24 1154 101/65 97.6 °F (36.4 °C) Temporal (!) 101 18 96 %   24 0922 -- -- -- -- 16 --   24 0730 123/88 97.8 °F (36.6 °C) Temporal (!) 109 16 97 %   24 0615 -- -- -- -- -- 98 %   24 0442  D.O. Date:     09/03/2024 Time:    07:37     XR CHEST PORTABLE    Result Date: 9/3/2024   Nodule like opacities in the left upper lung.  Recommend follow-up chest CT.  Calcified atherosclerosis.     ______________________________________ Electronically signed by: ARNOLD HARRISON M.D. Date:     09/03/2024 Time:    01:52     CTA HEAD NECK W CONTRAST    Result Date: 9/3/2024   No large vessel occlusion.  Heavy atherosclerotic plaque at the carotid bifurcations.   80 - 90% stenosis of the proximal left internal carotid artery.  60% stenosis of the proximal right internal carotid artery.  Left upper lobe nodules measuring 1.3 and 1.1 cm.  Recommend further evaluation with chest CT  All CT scans are performed using dose optimization techniques as appropriate to the performed exam and include at least one of the following: Automated exposure control, adjustment of the mA and/or kV according to size, and the use of iterative reconstruction technique.  ______________________________________ Electronically signed by: ARNOLD HARRISON M.D. Date:     09/03/2024 Time:    01:28     CT HEAD WO CONTRAST    Result Date: 9/3/2024  1.  CT of the brain within normal limits.  Discussed with ordering physician 0054 hours Central time 09/03/2024.  All CT scans are performed using dose optimization techniques as appropriate to the performed exam and include at least one of the following: Automated exposure control, adjustment of the mA and/or kV according to size, and the use of iterative reconstruction technique.  ______________________________________ Electronically signed by: JOANNA MACK M.D. Date:     09/03/2024 Time:    00:48          Assessment/Plan  Principal Problem:    Pneumonia due to other specified bacteria (HCC)  Active Problems:    Stroke-like symptoms    Hyperlipidemia    Acute ischemic stroke (HCC)    Hypotension    Diarrhea    Diabetes (HCC)    Essential hypertension    Hemiplegia affecting dominant side (HCC)    Stenosis

## 2024-09-09 NOTE — CARE COORDINATION
[facility]  Notification of Admission Decision      [] Patient has been accepted for admit to [facility] on :       Please write discharge orders and summary prior to discharge.    [x] Patient acceptance to Rehab pending the following : insurance approval    [] Eval in progress       [] Patient determined to be ineligible for services at [facility] because :       We recommend you consider        Thank you for your referral, we appreciate you. If you have any questions, please feel   free to contact me at [phone].  Electronically Signed by Jaja Dorsey, Admissions Coordinator 9/9/2024 1:15 PM   yes

## 2024-09-09 NOTE — PROGRESS NOTES
Patient:   Mary Lira  MR#:    [id number]   Room:    0434/434-01   YOB: 1975  Date of Progress Note: 9/9/2024  Time of Note                           7:14 AM  Consulting Physician:   Sandeep Diaz M.D.  Attending Physician:  Bashir Bhakta MD     Chief complaint Right-sided weakness/slurred speech/hypotension/hypoglycemia     S:This 48 y.o. female  with past medical history significant for atrial fibrillation not on anticoagulation, history of GI bleed, diabetes, CHF and hyperlipidemia is seen for evaluation of right-sided weakness and confusion.  On the day of admission the patient was last known well around 9 PM.  She went to sleep.  A few hours later the family awakened her when her Dexcom notified that her blood sugar was 30.  She was confused and disoriented when awoken.  She was found to have right-sided weakness and slurred speech.  She was brought in for evaluation.  She has been coughing for about 1 week along with some diarrhea.  She denies any previous history of stroke.  She is not on blood thinners.  CT of the head with no acute changes.  CT of the head and neck with no evidence of thrombus but with severe left carotid stenosis approximately approximately 80-90%.  She was transferred to the ICU and placed on pressors.  EKG with sinus rhythm.  Chest x-ray nodular opacities in the left upper lung.  Troponin borderline elevated.  Respiratory panel negative.  Serum creatinine elevated 1.2.  Discussions were held with ER physician approximately 4 hours and 15 minutes from time last known well.  Exam at that time revealed some drift in the right arm and right leg.  Movement improved on the right.  Atrial fibrillation noted.  Status post left carotid endarterectomy.  Doing well this morning without complaint.      REVIEW OF SYSTEMS:  Constitutional: No fevers No chills  Neck:No stiffness  Respiratory: No shortness of breath  Cardiovascular: No chest pain No palpitations  Gastrointestinal:  function.    Aortic Valve: Mildly calcified cusps.    Mitral Valve: Annular calcification. Mildly thickened leaflets. Mild stenosis noted. MV mean gradient is 4 mmHg.    Interatrial Septum: Agitated saline study was negative with and without provocation.    Image quality is adequate. Contrast used: Definity.       RECORD REVIEW: Previous medical records, medications were reviewed at today's visit    IMPRESSION:   Acute ischemic stroke-right-sided weakness/dysarthria  Patient was well beyond the 3-hour limit for IV tPA and risk-benefit did not favor giving tPA prior to 4 1/2 hours     Initial examination  Awake, alert, oriented, slightly slow  Right-sided facial droop  Minimal antigravity in the right arm  Drift of the right leg        CT of the head-no acute changes  CTA of the head and neck-80 to 90% stenosis in the left proximal carotid-most likely etiology of stroke with recent coughing issues  CT perfusion-left MCA ischemia       MRI of the brain personally reviewed-very small punctate infarcts left parietal left frontal region    Carotid ultrasound-severe bilateral internal carotid artery stenosis-varying density and ulcerative plaque in the right internal carotid-vascular input appreciated    Status post left carotid endarterectomy    2D echocardiogram with bubble study-left ventricular ejection fraction 55 to 60%/no PFO/normal left atrium        Vascular consult appreciated-s/p left carotid endarterectomy     Eliquis started in addition to aspirin     Hyperlipidemia-on statin-LDL 45  Diabetes-on insulin monitor blood sugar-check hemoglobin A1c  Mood disorder-on meds monitor  Hypotension-hold blood pressure medicines/avoid hypotension  CHF- monitor  Respiratory-DuoNebs  History of atrial fibrillation-monitor  History of GI bleed-monitor  Diarrhea-rectal tube  Renal insufficiency-monitor     PT/OT/speech    Continue present care    Rehab consult pending    CALL WITH ANY QUESTIONS  [phone] CELL  Dr Hopkins

## 2024-09-09 NOTE — PROGRESS NOTES
09/09/24 1000   Subjective   Subjective Patient in bed not feeling too well 2 pain in L ear does not rate pain.  Mother, daughter , & grand child present at start of TX.  (TX required diony as MD present examining L ear & time to acquire eqiipment & supplies.)   Cognition   Overall Cognitive Status WFL   Orientation   Overall Orientation Status WNL   Vitals   O2 Device None (Room air)   Bed Mobility Training   Bed Mobility Training Yes   Overall Level of Assistance Stand-by assistance  (With HOB elevated & use of BR's)   Rolling Stand-by assistance  (> R)   Supine to Sit Stand-by assistance   Sit to Supine Stand-by assistance  (To EOB)   Balance   Sitting Intact  (EOB 15 - 20 minutes for EX & sit > stand.)   Transfer Training   Transfer Training Yes   Sit to Stand Maximum assistance  (Bed at lowest elevation air mattress slightly deflated.)   Stand to Sit Minimum assistance   Bed to Chair Moderate assistance  (T R a drop arm chair.  Plus one for safety)   Gait Training   Gait Training Yes   Gait   Overall Level of Assistance Moderate assistance   Distance (ft) 2 Feet   Assistive Device None  (Held therapists forearms.)   PT Exercises   A/AROM Exercises In supine BL LE EX. X 10 reps.  Sitting EOB BL LE EX X 10 reps.   Patient Education   Education Given To Patient   Education Provided Role of Therapy;Plan of Care;Transfer Training;Energy Conservation;Fall Prevention Strategies   Education Provided Comments Use of call light & staff assist.  IN PATIENT REHAB.   Education Method Demonstration;Verbal   Education Outcome Verbalized understanding;Demonstrated understanding;Continued education needed   Other Specialty Interventions   Other Treatments/Modalities Patient in chair.  Pao Deshpande OT present when this therapist left.   Assessment   Activity Tolerance Patient tolerated treatment well   PT Plan of Care   Monday X       Electronically signed by Hugo Andrews PTA on 9/9/2024 at 10:45 AM

## 2024-09-09 NOTE — PROGRESS NOTES
Occupational Therapy Re Assessment  Date: 2024   Patient Name: Mary Lira  MRN: [id number]     : 1975    Date of Service: 2024    Discharge Recommendations:  Patient would benefit from continued therapy after discharge       Assessment   Assessment: Re assessed s/p left CEA. The patient would benefit from further therapy to upgrade functional status.  Treatment Diagnosis: PNA, acute ischemic stroke, right side weakness, L CEA  Activity Tolerance  Activity Tolerance: Patient Tolerated treatment well           Patient Diagnosis(es): The primary encounter diagnosis was Stroke-like symptoms. Diagnoses of Leukocytosis, unspecified type, Pneumonia due to infectious organism, unspecified laterality, unspecified part of lung, Carotid stenosis, left, Cerebrovascular accident (CVA), unspecified mechanism (HCC), Shortness of breath, and Left carotid artery stenosis were also pertinent to this visit.   has a past medical history of Anxiety, Apnea, CHF (congestive heart failure) (HCC), Diabetes mellitus (HCC), Hyperlipidemia, and Hypertension.   has a past surgical history that includes Appendectomy and Carotid endarterectomy (Left, 2024).    Treatment Diagnosis: PNA, acute ischemic stroke, right side weakness, L CEA      Restrictions  Restrictions/Precautions  Restrictions/Precautions: Fall Risk  Required Braces or Orthoses?: No  Position Activity Restriction  Other position/activity restrictions: NTL at [facility]    Subjective   General  Chart Reviewed: Yes  Patient assessed for rehabilitation services?: Yes  Family / Caregiver Present: No  Pain Assessment  Pain Level: 9  Patient's Stated Pain Goal: 0 - No pain  Pain Location: Neck  Pain Screening  Pain at present: 0  Scale Used: Numeric Score  Intervention List: Patient able to continue with treatment  Vital Signs  Respirations: 16  Oxygen Therapy  O2 Device: None (Room air)    Social/Functional History  Social/Functional History  Lives With: Daughter  Type

## 2024-09-10 LAB
ANION GAP SERPL CALCULATED.3IONS-SCNC: 11 MMOL/L (ref 7–19)
BASOPHILS # BLD: 0.1 K/UL (ref 0–0.2)
BASOPHILS NFR BLD: 0.6 % (ref 0–1)
BUN SERPL-MCNC: 18 MG/DL (ref 6–20)
CALCIUM SERPL-MCNC: 9.2 MG/DL (ref 8.6–10)
CHLORIDE SERPL-SCNC: 97 MMOL/L (ref 98–111)
CO2 SERPL-SCNC: 27 MMOL/L (ref 22–29)
CREAT SERPL-MCNC: 0.5 MG/DL (ref 0.5–0.9)
EOSINOPHIL # BLD: 1 K/UL (ref 0–0.6)
EOSINOPHIL NFR BLD: 7 % (ref 0–5)
ERYTHROCYTE [DISTWIDTH] IN BLOOD BY AUTOMATED COUNT: 14.7 % (ref 11.5–14.5)
GLUCOSE BLD-MCNC: 229 MG/DL (ref 70–99)
GLUCOSE BLD-MCNC: 232 MG/DL (ref 70–99)
GLUCOSE BLD-MCNC: 293 MG/DL (ref 70–99)
GLUCOSE BLD-MCNC: 307 MG/DL (ref 70–99)
GLUCOSE SERPL-MCNC: 246 MG/DL (ref 70–99)
HCT VFR BLD AUTO: 36.3 % (ref 37–47)
HGB BLD-MCNC: 10.6 G/DL (ref 12–16)
IMM GRANULOCYTES # BLD: 0.1 K/UL
LYMPHOCYTES # BLD: 2.3 K/UL (ref 1.1–4.5)
LYMPHOCYTES NFR BLD: 16.9 % (ref 20–40)
MCH RBC QN AUTO: 24.1 PG (ref 27–31)
MCHC RBC AUTO-ENTMCNC: 29.2 G/DL (ref 33–37)
MCV RBC AUTO: 82.5 FL (ref 81–99)
MONOCYTES # BLD: 1 K/UL (ref 0–0.9)
MONOCYTES NFR BLD: 6.9 % (ref 0–10)
NEUTROPHILS # BLD: 9.4 K/UL (ref 1.5–7.5)
NEUTS SEG NFR BLD: 68 % (ref 50–65)
PERFORMED ON: ABNORMAL
PLATELET # BLD AUTO: 378 K/UL (ref 130–400)
PMV BLD AUTO: 10.2 FL (ref 9.4–12.3)
POTASSIUM SERPL-SCNC: 4.6 MMOL/L (ref 3.5–5)
RBC # BLD AUTO: 4.4 M/UL (ref 4.2–5.4)
SODIUM SERPL-SCNC: 135 MMOL/L (ref 136–145)
WBC # BLD AUTO: 13.8 K/UL (ref 4.8–10.8)

## 2024-09-10 PROCEDURE — 6370000000 HC RX 637 (ALT 250 FOR IP): Performed by: SURGERY

## 2024-09-10 PROCEDURE — 97530 THERAPEUTIC ACTIVITIES: CPT

## 2024-09-10 PROCEDURE — 2580000003 HC RX 258: Performed by: SURGERY

## 2024-09-10 PROCEDURE — 85025 COMPLETE CBC W/AUTO DIFF WBC: CPT

## 2024-09-10 PROCEDURE — 36415 COLL VENOUS BLD VENIPUNCTURE: CPT

## 2024-09-10 PROCEDURE — 94640 AIRWAY INHALATION TREATMENT: CPT

## 2024-09-10 PROCEDURE — 6360000002 HC RX W HCPCS: Performed by: SURGERY

## 2024-09-10 PROCEDURE — 82962 GLUCOSE BLOOD TEST: CPT

## 2024-09-10 PROCEDURE — 99232 SBSQ HOSP IP/OBS MODERATE 35: CPT | Performed by: PSYCHIATRY & NEUROLOGY

## 2024-09-10 PROCEDURE — 80048 BASIC METABOLIC PNL TOTAL CA: CPT

## 2024-09-10 PROCEDURE — 94760 N-INVAS EAR/PLS OXIMETRY 1: CPT

## 2024-09-10 PROCEDURE — 1200000000 HC SEMI PRIVATE

## 2024-09-10 PROCEDURE — 6370000000 HC RX 637 (ALT 250 FOR IP): Performed by: STUDENT IN AN ORGANIZED HEALTH CARE EDUCATION/TRAINING PROGRAM

## 2024-09-10 RX ORDER — LIDOCAINE 4 G/G
1 PATCH TOPICAL EVERY 24 HOURS
Status: DISCONTINUED | OUTPATIENT
Start: 2024-09-10 | End: 2024-09-12 | Stop reason: HOSPADM

## 2024-09-10 RX ORDER — SENNA AND DOCUSATE SODIUM 50; 8.6 MG/1; MG/1
2 TABLET, FILM COATED ORAL DAILY
Status: DISCONTINUED | OUTPATIENT
Start: 2024-09-10 | End: 2024-09-12 | Stop reason: HOSPADM

## 2024-09-10 RX ADMIN — HYDROCODONE BITARTRATE AND ACETAMINOPHEN 1 TABLET: 7.5; 325 TABLET ORAL at 18:03

## 2024-09-10 RX ADMIN — ASPIRIN 81 MG CHEWABLE TABLET 81 MG: 81 TABLET CHEWABLE at 10:13

## 2024-09-10 RX ADMIN — FUROSEMIDE 20 MG: 20 TABLET ORAL at 10:18

## 2024-09-10 RX ADMIN — BUSPIRONE HYDROCHLORIDE 5 MG: 5 TABLET ORAL at 10:12

## 2024-09-10 RX ADMIN — HYDROMORPHONE HYDROCHLORIDE 0.5 MG: 1 INJECTION, SOLUTION INTRAMUSCULAR; INTRAVENOUS; SUBCUTANEOUS at 11:45

## 2024-09-10 RX ADMIN — GABAPENTIN 300 MG: 300 CAPSULE ORAL at 21:06

## 2024-09-10 RX ADMIN — HYDROCODONE BITARTRATE AND ACETAMINOPHEN 1 TABLET: 7.5; 325 TABLET ORAL at 09:24

## 2024-09-10 RX ADMIN — SERTRALINE HYDROCHLORIDE 100 MG: 100 TABLET ORAL at 10:13

## 2024-09-10 RX ADMIN — TIZANIDINE 4 MG: 4 TABLET ORAL at 10:14

## 2024-09-10 RX ADMIN — MAGNESIUM HYDROXIDE 30 ML: 400 SUSPENSION ORAL at 18:03

## 2024-09-10 RX ADMIN — IPRATROPIUM BROMIDE AND ALBUTEROL SULFATE 1 DOSE: 2.5; .5 SOLUTION RESPIRATORY (INHALATION) at 10:03

## 2024-09-10 RX ADMIN — INSULIN LISPRO 1 UNITS: 100 INJECTION, SOLUTION INTRAVENOUS; SUBCUTANEOUS at 10:14

## 2024-09-10 RX ADMIN — GABAPENTIN 300 MG: 300 CAPSULE ORAL at 15:05

## 2024-09-10 RX ADMIN — SODIUM CHLORIDE, PRESERVATIVE FREE 10 ML: 5 INJECTION INTRAVENOUS at 10:14

## 2024-09-10 RX ADMIN — DILTIAZEM HYDROCHLORIDE 300 MG: 180 CAPSULE, COATED, EXTENDED RELEASE ORAL at 10:11

## 2024-09-10 RX ADMIN — WATER 1000 MG: 1 INJECTION INTRAMUSCULAR; INTRAVENOUS; SUBCUTANEOUS at 00:41

## 2024-09-10 RX ADMIN — SODIUM CHLORIDE, PRESERVATIVE FREE 10 ML: 5 INJECTION INTRAVENOUS at 21:05

## 2024-09-10 RX ADMIN — IPRATROPIUM BROMIDE AND ALBUTEROL SULFATE 1 DOSE: 2.5; .5 SOLUTION RESPIRATORY (INHALATION) at 14:55

## 2024-09-10 RX ADMIN — ATORVASTATIN CALCIUM 40 MG: 40 TABLET, FILM COATED ORAL at 21:05

## 2024-09-10 RX ADMIN — INSULIN LISPRO 3 UNITS: 100 INJECTION, SOLUTION INTRAVENOUS; SUBCUTANEOUS at 18:02

## 2024-09-10 RX ADMIN — INSULIN GLARGINE 20 UNITS: 100 INJECTION, SOLUTION SUBCUTANEOUS at 10:15

## 2024-09-10 RX ADMIN — HYDROMORPHONE HYDROCHLORIDE 0.5 MG: 1 INJECTION, SOLUTION INTRAMUSCULAR; INTRAVENOUS; SUBCUTANEOUS at 21:06

## 2024-09-10 RX ADMIN — GABAPENTIN 300 MG: 300 CAPSULE ORAL at 10:12

## 2024-09-10 RX ADMIN — IPRATROPIUM BROMIDE AND ALBUTEROL SULFATE 1 DOSE: 2.5; .5 SOLUTION RESPIRATORY (INHALATION) at 19:20

## 2024-09-10 RX ADMIN — SENNOSIDES AND DOCUSATE SODIUM 2 TABLET: 50; 8.6 TABLET ORAL at 18:03

## 2024-09-10 RX ADMIN — HYDROMORPHONE HYDROCHLORIDE 0.5 MG: 1 INJECTION, SOLUTION INTRAMUSCULAR; INTRAVENOUS; SUBCUTANEOUS at 15:05

## 2024-09-10 RX ADMIN — APIXABAN 5 MG: 5 TABLET, FILM COATED ORAL at 10:13

## 2024-09-10 RX ADMIN — TRAZODONE HYDROCHLORIDE 100 MG: 100 TABLET ORAL at 21:06

## 2024-09-10 RX ADMIN — APIXABAN 5 MG: 5 TABLET, FILM COATED ORAL at 21:05

## 2024-09-10 RX ADMIN — IPRATROPIUM BROMIDE AND ALBUTEROL SULFATE 1 DOSE: 2.5; .5 SOLUTION RESPIRATORY (INHALATION) at 06:49

## 2024-09-10 RX ADMIN — HYDROMORPHONE HYDROCHLORIDE 0.5 MG: 1 INJECTION, SOLUTION INTRAMUSCULAR; INTRAVENOUS; SUBCUTANEOUS at 03:09

## 2024-09-10 RX ADMIN — POLYETHYLENE GLYCOL 3350 17 G: 17 POWDER, FOR SOLUTION ORAL at 11:45

## 2024-09-10 RX ADMIN — METOPROLOL SUCCINATE 25 MG: 25 TABLET, EXTENDED RELEASE ORAL at 10:13

## 2024-09-10 RX ADMIN — TIZANIDINE 4 MG: 4 TABLET ORAL at 21:06

## 2024-09-10 RX ADMIN — INSULIN LISPRO 2 UNITS: 100 INJECTION, SOLUTION INTRAVENOUS; SUBCUTANEOUS at 11:46

## 2024-09-10 ASSESSMENT — PAIN DESCRIPTION - DESCRIPTORS
DESCRIPTORS: STABBING
DESCRIPTORS: BURNING
DESCRIPTORS: SHARP;STABBING
DESCRIPTORS: SHOOTING;SHARP
DESCRIPTORS: SHOOTING;STABBING

## 2024-09-10 ASSESSMENT — PAIN SCALES - GENERAL
PAINLEVEL_OUTOF10: 10
PAINLEVEL_OUTOF10: 9
PAINLEVEL_OUTOF10: 8
PAINLEVEL_OUTOF10: 8
PAINLEVEL_OUTOF10: 9
PAINLEVEL_OUTOF10: 8

## 2024-09-10 ASSESSMENT — PAIN DESCRIPTION - LOCATION
LOCATION: NECK
LOCATION: NECK;HIP
LOCATION: NECK
LOCATION: NECK;BACK
LOCATION: BACK;NECK
LOCATION: NECK
LOCATION: BACK;HIP

## 2024-09-10 ASSESSMENT — PAIN DESCRIPTION - ORIENTATION
ORIENTATION: LEFT

## 2024-09-10 NOTE — PLAN OF CARE
Problem: Discharge Planning  Goal: Discharge to home or other facility with appropriate resources  9/10/2024 1027 by Asmita Sequeira RN  Outcome: Progressing  9/9/2024 2235 by Olga Farnsworth LPN  Outcome: Progressing     Problem: Pain  Goal: Verbalizes/displays adequate comfort level or baseline comfort level  9/10/2024 1027 by Asmita Sequeira RN  Outcome: Progressing  9/9/2024 2235 by Olga Farnsworth LPN  Outcome: Progressing     Problem: Safety - Adult  Goal: Free from fall injury  9/10/2024 1027 by Asmita Sequeira RN  Outcome: Progressing  9/9/2024 2235 by Olga Farnsworth LPN  Outcome: Progressing     Problem: Skin/Tissue Integrity  Goal: Absence of new skin breakdown  Description: 1.  Monitor for areas of redness and/or skin breakdown  2.  Assess vascular access sites hourly  3.  Every 4-6 hours minimum:  Change oxygen saturation probe site  4.  Every 4-6 hours:  If on nasal continuous positive airway pressure, respiratory therapy assess nares and determine need for appliance change or resting period.  9/10/2024 1027 by Asmita Sequeira RN  Outcome: Progressing  9/9/2024 2235 by Olga Farnsworth LPN  Outcome: Progressing     Problem: Chronic Conditions and Co-morbidities  Goal: Patient's chronic conditions and co-morbidity symptoms are monitored and maintained or improved  9/10/2024 1027 by Asmita Sequeira RN  Outcome: Progressing  9/9/2024 2235 by Olga Farnsworth LPN  Outcome: Progressing     Problem: ABCDS Injury Assessment  Goal: Absence of physical injury  9/10/2024 1027 by Asmita Sequeira RN  Outcome: Progressing  9/9/2024 2235 by Olga Farnsworth LPN  Outcome: Progressing

## 2024-09-10 NOTE — PROGRESS NOTES
Occupational Therapy     09/10/24 1505   Subjective   Subjective Pt in bed upon arrival for therapy. Pt agreeable to participate. Family present in room.   Pain Assessment   Pain Level 8   Patient's Stated Pain Goal 0 - No pain   Pain Location Back;Neck   Pain Descriptors Stabbing   Vitals   Respirations 16   Cognition   Overall Cognitive Status WFL   Orientation   Overall Orientation Status WNL   Bed Mobility Training   Bed Mobility Training Yes   Overall Level of Assistance Stand-by assistance   Interventions Verbal cues   Supine to Sit Stand-by assistance   Sit to Supine Stand-by assistance   Scooting Stand-by assistance   Transfer Training   Transfer Training Yes   Overall Level of Assistance Minimum assistance   Interventions Verbal cues;Tactile cues;Manual cues   Sit to Stand Minimum assistance   Stand to Sit Minimum assistance   Balance   Sitting Intact   Standing With support  (RW)   ADL   Feeding Independent;Setup   Grooming Independent;Setup   UE Bathing Supervision   LE Bathing Minimal assistance   UE Dressing Supervision   LE Dressing Minimal assistance   Toileting Contact guard assistance;Minimal assistance   Functional Mobility Minimal assistance   Functional Mobility Skilled Clinical Factors RW   Additional Comments Per clinical observation   Assessment   Assessment Tx focused on sitting EOB to don slip on shoes. Pt instructed on STS mobility at RW level with Min assist x1-2 for safety. Pt instructed on steps to HOB and steps in place with Min assist for steadying balance at times. Pt returned to bed after tasks.   Activity Tolerance Patient tolerated treatment well   Discharge Recommendations Patient would benefit from continued therapy after discharge   Occupational Therapy Plan   Times Per Week 3-5   Times Per Day Once a day   OT Plan of Care   Tuesday X     Electronically signed by ALONSO Matt on 9/10/2024 at 3:47 PM

## 2024-09-10 NOTE — PROGRESS NOTES
Patient:   Mary Lira  MR#:    [id number]   Room:    0434/434-02   YOB: 1975  Date of Progress Note: 9/10/2024  Time of Note                           6:47 AM  Consulting Physician:   Sandeep Diaz M.D.  Attending Physician:  Bashir Bhakta MD     Chief complaint Right-sided weakness/slurred speech/hypotension/hypoglycemia     S:This 48 y.o. female  with past medical history significant for atrial fibrillation not on anticoagulation, history of GI bleed, diabetes, CHF and hyperlipidemia is seen for evaluation of right-sided weakness and confusion.  On the day of admission the patient was last known well around 9 PM.  She went to sleep.  A few hours later the family awakened her when her Dexcom notified that her blood sugar was 30.  She was confused and disoriented when awoken.  She was found to have right-sided weakness and slurred speech.  She was brought in for evaluation.  She has been coughing for about 1 week along with some diarrhea.  She denies any previous history of stroke.  She is not on blood thinners.  CT of the head with no acute changes.  CT of the head and neck with no evidence of thrombus but with severe left carotid stenosis approximately approximately 80-90%.  She was transferred to the ICU and placed on pressors.  EKG with sinus rhythm.  Chest x-ray nodular opacities in the left upper lung.  Troponin borderline elevated.  Respiratory panel negative.  Serum creatinine elevated 1.2.  Discussions were held with ER physician approximately 4 hours and 15 minutes from time last known well.  Exam at that time revealed some drift in the right arm and right leg.  Movement improved on the right.  Atrial fibrillation noted.  Status post left carotid endarterectomy.  No other complaints.  Feeling well overall.      REVIEW OF SYSTEMS:  Constitutional: No fevers No chills  Neck:No stiffness  Respiratory: No shortness of breath  Cardiovascular: No chest pain No palpitations  Gastrointestinal:  cm/s    Right ICA prox .0 cm/s    Right CCA mid PSV 76.20 cm/s    Right CCA mid EDV 33.90 cm/s    Right CCA prox .0 cm/s    Right CCA prox EDV 36.7 cm/s    Right ECA .0 cm/s    Right vertebral PSV 53.8 cm/s    Right vertebral EDV 26.90 cm/s    Right arm  mmHg   Narrative:       Severe (>=70%) stenosis in the right internal carotid artery.  Varying  density and ulcerative plaque in the right internal carotid artery.    Severe (>=70%) stenosis in the left internal carotid artery.    Heterogeneous plaque in the left internal carotid artery.    Normal antegrade flow involving the right vertebral artery.    Normal antegrade flow involving the left vertebral artery.       Echo (TTE) complete with contrast and bubble study    Patient Name: Mary Lira   Patient MRN: [id number]   Patient : 1975 (48 y.o.)   Legal Sex: Female   Height: 1.6 m (5' 3\")   Weight: 109.3 kg (241 lb)   BSA: 2.2 m²   Blood Pressure: 136/80    Accession Number: [id number]   Date of Study: 2024 ( 9:13 AM)   Ordering Provider: Howie Matute MD   Clinical Indications: CVA       Reading Physicians  Performing Staff   Cardiology: Akash Lazo MD    Tech/Nurse: Lian Jesus        Reason for Exam  Priority: Routine  CVA   Dx: Cerebrovascular accident (CVA), unspecified mechanism (HCC) [I63.9 (ICD-10-CM)]     PACS Images     Show images for Echo (TTE) complete (PRN contrast/bubble/strain/3D)  Interpretation Summary  Show Result Comparison     Left Ventricle: Normal left ventricular systolic function with a visually estimated EF of 55 - 60%. Left ventricle size is normal. Normal wall motion. Normal diastolic function.    Aortic Valve: Mildly calcified cusps.    Mitral Valve: Annular calcification. Mildly thickened leaflets. Mild stenosis noted. MV mean gradient is 4 mmHg.    Interatrial Septum: Agitated saline study was negative with and without provocation.    Image quality is adequate. Contrast

## 2024-09-10 NOTE — PROGRESS NOTES
Physical Therapy  Name: Mary Lira  MRN:  [id number]  Date of service:  9/10/2024       09/10/24 1448   Restrictions/Precautions   Restrictions/Precautions Fall Risk   Required Braces or Orthoses? No   General   Family / Caregiver Present Yes  (mother, daughter, grandson)   Referring Practitioner Sandeep Diaz MD, Howie Matute MD   Subjective   Subjective yeah I can try something. I just got my pain shot not long ago   Oxygen Therapy   O2 Device None (Room air)   Bed Mobility   Supine to Sit Minimal assistance   Sit to Supine Minimal assistance   Transfers   Sit to Stand Moderate Assistance   Stand to Sit Moderate Assistance   Ambulation   Comments steps to HOB HHA x 2 min A   Exercises   Hip Flexion standing marching x 10   Patient Goals    Patient Goals  go home, get stronger   Short Term Goals   Time Frame for Short Term Goals 2 wks   Short Term Goal 1 supine to sit indep   Short Term Goal 2 sit to stand indep   Short Term Goal 3 amb. 200' with RW SBA   Short Term Goal 4 bed to chair SBA   Conditions Requiring Skilled Therapeutic Intervention   Body Structures, Functions, Activity Limitations Requiring Skilled Therapeutic Intervention Decreased functional mobility ;Decreased ADL status;Decreased ROM;Decreased safe awareness;Decreased strength;Decreased sensation;Decreased balance;Decreased posture   Assessment patient might could try farther ambulation with RW and chair follow.   Treatment Diagnosis impaired gait and mobility   Discharge Recommendations Continue to assess pending progress;24 hour supervision or assist;Patient would benefit from continued therapy after discharge   Activity Tolerance   Activity Tolerance Patient tolerated treatment well   Physical Therapy Plan   General Plan 6-7 times per week   Therapy Duration 2 Weeks   Current Treatment Recommendations Strengthening;ROM;Balance training;Functional mobility training;Transfer training;Gait training;Endurance training;Safety education &

## 2024-09-10 NOTE — PROGRESS NOTES
Comprehensive Nutrition Assessment    Type and Reason for Visit:  Initial, RD Nutrition Re-Screen/LOS    Nutrition Recommendations/Plan:   Continue POC.     Malnutrition Assessment:  Malnutrition Status:  At risk for malnutrition (Comment) (09/10/24 1431)    Context:  Acute Illness     Findings of the 6 clinical characteristics of malnutrition:  Energy Intake:  No significant decrease in energy intake  Weight Loss:  No significant weight loss     Body Fat Loss:  Unable to assess     Muscle Mass Loss:  Unable to assess    Fluid Accumulation:  No significant fluid accumulation Extremities, Generalized   Strength:  Not Performed    Nutrition Assessment:    LOS day 7. Unable to visit pt d/t pt care. Documented intake adequate: %. Current therapeutic diet appropriate. Wt hx shows no significant changes. Will continue to monitor.    Nutrition Related Findings:    BM 9/3. Trace generalized, trace non-pitting BUE, non-pitting BLE edema. Na 135, BNP 1,602, A1c 7.4%, glu 142-246, accuchek's 150-308. Wound Type: Skin Tears, Surgical Incision       Current Nutrition Intake & Therapies:    Average Meal Intake: %, 26-50%, 51-75%  Average Supplements Intake: None Ordered  ADULT DIET; Regular; 4 carb choices (60 gm/meal)    Anthropometric Measures:  Height: 160 cm (5' 2.99\")  Ideal Body Weight (IBW): 115 lbs (52 kg)    Current Body Weight: 119 kg (262 lb 5.6 oz), 228.1 % IBW. Weight Source: Bed Scale  Current BMI (kg/m2): 46.5  Usual Body Weight: 114.3 kg (252 lb) (6/6/24)  % Weight Change (Calculated): 4.1  BMI Categories: Obese Class 3 (BMI 40.0 or greater)    Nutrition Diagnosis:   Altered nutrition-related lab values related to endocrine dysfuntion as evidenced by lab values    Nutrition Interventions:   Food and/or Nutrient Delivery: Continue Current Diet  Nutrition Education/Counseling: No recommendation at this time  Coordination of Nutrition Care: Continue to monitor while inpatient    Goals:  Goals: PO

## 2024-09-10 NOTE — PROGRESS NOTES
Daily Progress Note    Date:9/10/2024  Patient: Mary Lira  : 1975  MRN:[id number]  CODE:Full Code No additional code details  PCP:Bruno Manning MD    Admit Date: 9/3/2024 12:28 AM   LOS: 7 days     Chief Complaint   Patient presents with    Seizures    Hypoglycemia           Altered Mental Status         Subjective: NAEON. Pt having worse left sided neck pain today. She also reports right hip pain that radiates to her knee - this is a chronic intermittent pain that has been worse since being in the hospital and actually improves with activity/mobility. She worked with PT.   Has not had a bowel movement in 4 days.         Hospital Summary: 49 yo F with T2DM, CHF, HTN, ESTEVAN who presented to [facility] ED with AMS, slurred speech, and right sided weakness.  CT head negative for acute abnormalities.  Admitted to hospitalist service for further management.   MRI brain showed 2 small foci of acute infarction within left parietal and left frontal lobe.   CTA neck showed ~90% stenosis of left ICA. Vascular surgery was consulted and considering carotid intervention.  Cardiology consulted for preoperative assessment. Echo showed EF 55-60% without regional wall motion abnormalities, planning for Lexiscan tomorrow.    Pt reported productive cough and dyspnea. CXR showed left sided opacities/nodules, CT recommended for further evaluation. Pt started on empiric Azithromycin and Ceftriaxone.   CT chest showed possible pulmonary edema, left lower lobe pneumonia, and multiple left sided pulmonary nodules with the largest measuring 1.1 cm. Pt will need repeat CT chest in 3 months for surveillance.     Developed Afib with RVR. Converted to sinus rhythm with IV Cardizem push.   Started on Eliquis for AC post-op. Plavix discontinued, but continue ASA.         Review of Systems   All other systems reviewed and are negative.      Objective:      Vital signs in last 24 hours:  Patient Vitals for the past 24 hrs:   BP Temp Temp src  Labs are relatively stable. 1247    HYDROcodone-acetaminophen (NORCO) 7.5-325 MG per tablet 1 tablet, 1 tablet, Oral, Q4H PRN, Eliezer Palencia MD, 1 tablet at 09/10/24 0924      Imaging:  CT CHEST WO CONTRAST    Result Date: 9/4/2024  Impression: Interstitial pulmonary edema. Scattered tree in bud nodules in both lungs and consolidative opacity in the left lower lobe suspicious for pneumonia. Multiple pulmonary nodules in the left lung, largest measuring 1.1 cm. Recommend follow-up CT chest in 3 months. 1.2 cm enlarged precarinal lymph node, nonspecific.  Attention on follow-up CT chest.  All CT scans are performed using dose optimization techniques as appropriate to the performed exam and include at least one of the following: Automated exposure control, adjustment of the mA and/or kV according to size, and the use of iterative reconstruction technique.  ______________________________________ Electronically signed by: ALEX HALE D.O. Date:     09/04/2024 Time:    11:31     MRI brain without contrast    Result Date: 9/3/2024  There are two small foci of acute cerebral infarction seen within the cortex of the left parietal lobe and within the deep white matter of the left frontal lobe.  Mild chronic small vessel ischemic changes seen within the supratentorial white matter.  Critical result:  The findings were communicated to the patients nurse at 1:14 p.m. 09/03/2024.   ______________________________________ Electronically signed by: AALIYAH ORR M.D. Date:     09/03/2024 Time:    13:07     CT Brain Perfusion    Result Date: 9/3/2024   Elevated mean transit time in the left MCA territory corresponding to ischemia.  No core infarct.  Recommend consultation with neuro interventional service for possible catheter angiography.  Critical findings discussed with  ICU Nurse Vilma by Dr. Hale via telephone on 7:45 a.m. on 09/03/2024. Vilma indicated that neurology service is aware of these findings.  All CT scans are performed using dose

## 2024-09-11 LAB
ANION GAP SERPL CALCULATED.3IONS-SCNC: 12 MMOL/L (ref 7–19)
BASOPHILS # BLD: 0.1 K/UL (ref 0–0.2)
BASOPHILS NFR BLD: 0.5 % (ref 0–1)
BUN SERPL-MCNC: 17 MG/DL (ref 6–20)
CALCIUM SERPL-MCNC: 8.8 MG/DL (ref 8.6–10)
CHLORIDE SERPL-SCNC: 96 MMOL/L (ref 98–111)
CO2 SERPL-SCNC: 28 MMOL/L (ref 22–29)
CREAT SERPL-MCNC: 0.6 MG/DL (ref 0.5–0.9)
EOSINOPHIL # BLD: 1 K/UL (ref 0–0.6)
EOSINOPHIL NFR BLD: 7.1 % (ref 0–5)
ERYTHROCYTE [DISTWIDTH] IN BLOOD BY AUTOMATED COUNT: 14.5 % (ref 11.5–14.5)
GLUCOSE BLD-MCNC: 204 MG/DL (ref 70–99)
GLUCOSE BLD-MCNC: 222 MG/DL (ref 70–99)
GLUCOSE BLD-MCNC: 286 MG/DL (ref 70–99)
GLUCOSE BLD-MCNC: 332 MG/DL (ref 70–99)
GLUCOSE SERPL-MCNC: 212 MG/DL (ref 70–99)
HCT VFR BLD AUTO: 32 % (ref 37–47)
HGB BLD-MCNC: 9.9 G/DL (ref 12–16)
HYPOCHROMIA BLD QL SMEAR: ABNORMAL
IMM GRANULOCYTES # BLD: 0.1 K/UL
LYMPHOCYTES # BLD: 2.5 K/UL (ref 1.1–4.5)
LYMPHOCYTES NFR BLD: 18.4 % (ref 20–40)
MCH RBC QN AUTO: 24.5 PG (ref 27–31)
MCHC RBC AUTO-ENTMCNC: 30.9 G/DL (ref 33–37)
MCV RBC AUTO: 79.2 FL (ref 81–99)
MICROCYTES BLD QL SMEAR: ABNORMAL
MONOCYTES # BLD: 0.9 K/UL (ref 0–0.9)
MONOCYTES NFR BLD: 6.4 % (ref 0–10)
NEUTROPHILS # BLD: 9.1 K/UL (ref 1.5–7.5)
NEUTS SEG NFR BLD: 67.2 % (ref 50–65)
PERFORMED ON: ABNORMAL
PLATELET # BLD AUTO: 373 K/UL (ref 130–400)
PLATELET SLIDE REVIEW: ABNORMAL
PMV BLD AUTO: 10.3 FL (ref 9.4–12.3)
POTASSIUM SERPL-SCNC: 4.7 MMOL/L (ref 3.5–5)
RBC # BLD AUTO: 4.04 M/UL (ref 4.2–5.4)
SODIUM SERPL-SCNC: 136 MMOL/L (ref 136–145)
STOMATOCYTES BLD QL SMEAR: ABNORMAL
WBC # BLD AUTO: 13.5 K/UL (ref 4.8–10.8)

## 2024-09-11 PROCEDURE — 6370000000 HC RX 637 (ALT 250 FOR IP): Performed by: SURGERY

## 2024-09-11 PROCEDURE — 6360000002 HC RX W HCPCS: Performed by: SURGERY

## 2024-09-11 PROCEDURE — 6370000000 HC RX 637 (ALT 250 FOR IP): Performed by: STUDENT IN AN ORGANIZED HEALTH CARE EDUCATION/TRAINING PROGRAM

## 2024-09-11 PROCEDURE — 80048 BASIC METABOLIC PNL TOTAL CA: CPT

## 2024-09-11 PROCEDURE — 36415 COLL VENOUS BLD VENIPUNCTURE: CPT

## 2024-09-11 PROCEDURE — 97116 GAIT TRAINING THERAPY: CPT

## 2024-09-11 PROCEDURE — 85025 COMPLETE CBC W/AUTO DIFF WBC: CPT

## 2024-09-11 PROCEDURE — 99232 SBSQ HOSP IP/OBS MODERATE 35: CPT | Performed by: PSYCHIATRY & NEUROLOGY

## 2024-09-11 PROCEDURE — 1200000000 HC SEMI PRIVATE

## 2024-09-11 PROCEDURE — 94640 AIRWAY INHALATION TREATMENT: CPT

## 2024-09-11 PROCEDURE — 94760 N-INVAS EAR/PLS OXIMETRY 1: CPT

## 2024-09-11 PROCEDURE — 2500000003 HC RX 250 WO HCPCS: Performed by: STUDENT IN AN ORGANIZED HEALTH CARE EDUCATION/TRAINING PROGRAM

## 2024-09-11 PROCEDURE — 97530 THERAPEUTIC ACTIVITIES: CPT

## 2024-09-11 PROCEDURE — 82962 GLUCOSE BLOOD TEST: CPT

## 2024-09-11 PROCEDURE — 2580000003 HC RX 258: Performed by: SURGERY

## 2024-09-11 RX ORDER — INSULIN GLARGINE 100 [IU]/ML
25 INJECTION, SOLUTION SUBCUTANEOUS DAILY
Status: DISCONTINUED | OUTPATIENT
Start: 2024-09-11 | End: 2024-09-12

## 2024-09-11 RX ORDER — INSULIN LISPRO 100 [IU]/ML
3 INJECTION, SOLUTION INTRAVENOUS; SUBCUTANEOUS
Status: DISCONTINUED | OUTPATIENT
Start: 2024-09-11 | End: 2024-09-12 | Stop reason: HOSPADM

## 2024-09-11 RX ORDER — DILTIAZEM HYDROCHLORIDE 5 MG/ML
10 INJECTION INTRAVENOUS ONCE
Status: COMPLETED | OUTPATIENT
Start: 2024-09-11 | End: 2024-09-11

## 2024-09-11 RX ORDER — METOPROLOL SUCCINATE 50 MG/1
50 TABLET, EXTENDED RELEASE ORAL DAILY
Status: DISCONTINUED | OUTPATIENT
Start: 2024-09-11 | End: 2024-09-12 | Stop reason: HOSPADM

## 2024-09-11 RX ADMIN — SERTRALINE HYDROCHLORIDE 100 MG: 100 TABLET ORAL at 08:51

## 2024-09-11 RX ADMIN — INSULIN LISPRO 3 UNITS: 100 INJECTION, SOLUTION INTRAVENOUS; SUBCUTANEOUS at 13:32

## 2024-09-11 RX ADMIN — IPRATROPIUM BROMIDE AND ALBUTEROL SULFATE 1 DOSE: 2.5; .5 SOLUTION RESPIRATORY (INHALATION) at 06:48

## 2024-09-11 RX ADMIN — TRAZODONE HYDROCHLORIDE 100 MG: 100 TABLET ORAL at 21:30

## 2024-09-11 RX ADMIN — INSULIN GLARGINE 25 UNITS: 100 INJECTION, SOLUTION SUBCUTANEOUS at 08:55

## 2024-09-11 RX ADMIN — APIXABAN 5 MG: 5 TABLET, FILM COATED ORAL at 08:51

## 2024-09-11 RX ADMIN — METOPROLOL SUCCINATE 50 MG: 50 TABLET, EXTENDED RELEASE ORAL at 08:51

## 2024-09-11 RX ADMIN — HYDROCODONE BITARTRATE AND ACETAMINOPHEN 1 TABLET: 7.5; 325 TABLET ORAL at 08:51

## 2024-09-11 RX ADMIN — INSULIN LISPRO 1 UNITS: 100 INJECTION, SOLUTION INTRAVENOUS; SUBCUTANEOUS at 08:55

## 2024-09-11 RX ADMIN — HYDROMORPHONE HYDROCHLORIDE 0.5 MG: 1 INJECTION, SOLUTION INTRAMUSCULAR; INTRAVENOUS; SUBCUTANEOUS at 05:04

## 2024-09-11 RX ADMIN — ATORVASTATIN CALCIUM 40 MG: 40 TABLET, FILM COATED ORAL at 21:30

## 2024-09-11 RX ADMIN — INSULIN LISPRO 3 UNITS: 100 INJECTION, SOLUTION INTRAVENOUS; SUBCUTANEOUS at 17:45

## 2024-09-11 RX ADMIN — APIXABAN 5 MG: 5 TABLET, FILM COATED ORAL at 21:30

## 2024-09-11 RX ADMIN — FUROSEMIDE 20 MG: 20 TABLET ORAL at 08:50

## 2024-09-11 RX ADMIN — DILTIAZEM HYDROCHLORIDE 300 MG: 180 CAPSULE, COATED, EXTENDED RELEASE ORAL at 08:52

## 2024-09-11 RX ADMIN — GABAPENTIN 300 MG: 300 CAPSULE ORAL at 21:30

## 2024-09-11 RX ADMIN — INSULIN LISPRO 2 UNITS: 100 INJECTION, SOLUTION INTRAVENOUS; SUBCUTANEOUS at 13:33

## 2024-09-11 RX ADMIN — GABAPENTIN 300 MG: 300 CAPSULE ORAL at 08:51

## 2024-09-11 RX ADMIN — HYDROMORPHONE HYDROCHLORIDE 0.25 MG: 1 INJECTION, SOLUTION INTRAMUSCULAR; INTRAVENOUS; SUBCUTANEOUS at 11:16

## 2024-09-11 RX ADMIN — ASPIRIN 81 MG CHEWABLE TABLET 81 MG: 81 TABLET CHEWABLE at 08:55

## 2024-09-11 RX ADMIN — HYDROCODONE BITARTRATE AND ACETAMINOPHEN 1 TABLET: 7.5; 325 TABLET ORAL at 19:16

## 2024-09-11 RX ADMIN — HYDROMORPHONE HYDROCHLORIDE 0.5 MG: 1 INJECTION, SOLUTION INTRAMUSCULAR; INTRAVENOUS; SUBCUTANEOUS at 21:29

## 2024-09-11 RX ADMIN — SODIUM CHLORIDE, PRESERVATIVE FREE 10 ML: 5 INJECTION INTRAVENOUS at 21:31

## 2024-09-11 RX ADMIN — SODIUM CHLORIDE, PRESERVATIVE FREE 10 ML: 5 INJECTION INTRAVENOUS at 08:59

## 2024-09-11 RX ADMIN — DILTIAZEM HYDROCHLORIDE 10 MG: 5 INJECTION, SOLUTION INTRAVENOUS at 12:55

## 2024-09-11 RX ADMIN — IPRATROPIUM BROMIDE AND ALBUTEROL SULFATE 1 DOSE: 2.5; .5 SOLUTION RESPIRATORY (INHALATION) at 14:05

## 2024-09-11 RX ADMIN — BUSPIRONE HYDROCHLORIDE 5 MG: 5 TABLET ORAL at 08:59

## 2024-09-11 RX ADMIN — HYDROCODONE BITARTRATE AND ACETAMINOPHEN 1 TABLET: 7.5; 325 TABLET ORAL at 12:57

## 2024-09-11 RX ADMIN — SENNOSIDES AND DOCUSATE SODIUM 2 TABLET: 50; 8.6 TABLET ORAL at 08:51

## 2024-09-11 RX ADMIN — MAGNESIUM HYDROXIDE 30 ML: 400 SUSPENSION ORAL at 08:55

## 2024-09-11 RX ADMIN — GABAPENTIN 300 MG: 300 CAPSULE ORAL at 13:32

## 2024-09-11 RX ADMIN — IPRATROPIUM BROMIDE AND ALBUTEROL SULFATE 1 DOSE: 2.5; .5 SOLUTION RESPIRATORY (INHALATION) at 10:38

## 2024-09-11 RX ADMIN — HYDROMORPHONE HYDROCHLORIDE 0.5 MG: 1 INJECTION, SOLUTION INTRAMUSCULAR; INTRAVENOUS; SUBCUTANEOUS at 15:44

## 2024-09-11 RX ADMIN — TIZANIDINE 4 MG: 4 TABLET ORAL at 21:30

## 2024-09-11 ASSESSMENT — PAIN SCALES - GENERAL
PAINLEVEL_OUTOF10: 8
PAINLEVEL_OUTOF10: 7
PAINLEVEL_OUTOF10: 8
PAINLEVEL_OUTOF10: 8
PAINLEVEL_OUTOF10: 7

## 2024-09-11 ASSESSMENT — PAIN DESCRIPTION - DESCRIPTORS
DESCRIPTORS: SHARP;STABBING
DESCRIPTORS: SHARP;STABBING
DESCRIPTORS: DISCOMFORT
DESCRIPTORS: SHARP;STABBING
DESCRIPTORS: DISCOMFORT
DESCRIPTORS: DISCOMFORT

## 2024-09-11 ASSESSMENT — PAIN DESCRIPTION - LOCATION
LOCATION: HIP;KNEE
LOCATION: NECK
LOCATION: NECK
LOCATION: NECK;HIP
LOCATION: HIP
LOCATION: NECK
LOCATION: HIP

## 2024-09-11 ASSESSMENT — PAIN DESCRIPTION - ORIENTATION
ORIENTATION: RIGHT
ORIENTATION: LEFT
ORIENTATION: RIGHT

## 2024-09-11 NOTE — PROGRESS NOTES
Physical Therapy    Name: Mary Lira  MRN: [id number]  Date of service: 9/11/2024 09/11/24 1100   Restrictions/Precautions   Restrictions/Precautions Fall Risk   Position Activity Restriction   Other position/activity restrictions room full with family staying here with pt.   General   Diagnosis acute ischemic stroke, R side weakness, dysarthria, leukocytosis, PNA   General Comment   Comments Pt in bed   Subjective   Subjective agreed to therapy   Subjective   Subjective neck and back hurting   Pain did not rate   Bed mobility   Supine to Sit Stand by assistance   Transfers   Sit to Stand Minimal Assistance;2 Person Assistance   Stand to Sit Minimal Assistance;2 Person Assistance   Bed to Chair Minimal assistance;2 Person Assistance   Ambulation   Device Hand-Held Assist   Assistance Contact guard assistance;2 Person assistance   Distance 4-5 steps to recliner   Patient Goals    Patient Goals  go home, get stronger   Short Term Goals   Time Frame for Short Term Goals 2 wks   Short Term Goal 1 supine to sit indep   Short Term Goal 2 sit to stand indep   Short Term Goal 3 amb. 200' with RW SBA   Short Term Goal 4 bed to chair SBA   Activity Tolerance   Activity Tolerance Patient tolerated treatment well   Assessment   Assessment Pt has just been cleaned up and linens changed. Family in room. Sat EOB then took 4-5 steps to recliner. Left pt in bed with all needs in reach. Notified nurse of transfer.   Discharge Recommendations Continue to assess pending progress;24 hour supervision or assist;Patient would benefit from continued therapy after discharge   Physical Therapy Plan   General Plan 6-7 times per week   Therapy Duration 2 Weeks   Current Treatment Recommendations Strengthening;ROM;Balance training;Functional mobility training;Transfer training;Gait training;Endurance training;Safety education & training;Positioning;Equipment evaluation, education, & procurement;Patient/Caregiver education &  training;Therapeutic activities   PT Plan of Care   Wednesday X   Safety Devices   Type of Devices Left in chair;Call light within reach;Nurse notified   PT Whiteboard Notes   Therapy Whiteboard RE 9/17 acute ischemic stroke, R side weakness, dysarthria, leukocytosis, PNA   Recommendation   Requires PT Follow-Up Yes           Electronically signed by Maoy Judd PTA on 9/11/2024 at 11:23 AM

## 2024-09-11 NOTE — PROGRESS NOTES
Daily Progress Note    Date:2024  Patient: Mary Lira  : 1975  MRN:[id number]  CODE:Full Code No additional code details  PCP:Bruno Manning MD    Admit Date: 9/3/2024 12:28 AM   LOS: 8 days     Chief Complaint   Patient presents with    Seizures    Hypoglycemia           Altered Mental Status         Subjective: Pt went into Afib with RVR this AM with HR up to 160s. Given IV Diltiazem with improvement in HR to 100s.         Hospital Summary: 49 yo F with T2DM, CHF, HTN, ESTEVAN who presented to [facility] ED with AMS, slurred speech, and right sided weakness.  CT head negative for acute abnormalities.  Admitted to hospitalist service for further management.   MRI brain showed 2 small foci of acute infarction within left parietal and left frontal lobe.   CTA neck showed ~90% stenosis of left ICA. Vascular surgery was consulted and considering carotid intervention.  Cardiology consulted for preoperative assessment. Echo showed EF 55-60% without regional wall motion abnormalities, planning for Lexiscan tomorrow.    Pt reported productive cough and dyspnea. CXR showed left sided opacities/nodules, CT recommended for further evaluation. Pt started on empiric Azithromycin and Ceftriaxone.   CT chest showed possible pulmonary edema, left lower lobe pneumonia, and multiple left sided pulmonary nodules with the largest measuring 1.1 cm. Pt will need repeat CT chest in 3 months for surveillance.     Developed Afib with RVR. Converted to sinus rhythm with IV Cardizem push.   Started on Eliquis for AC post-op. Plavix discontinued, but continue ASA.         Review of Systems   All other systems reviewed and are negative.      Objective:      Vital signs in last 24 hours:  Patient Vitals for the past 24 hrs:   BP Temp Temp src Pulse Resp SpO2   24 1405 -- -- -- (!) 110 20 95 %   24 1258 128/78 -- -- (!) 108 -- --   24 1238 (!) 134/101 98.2 °F (36.8 °C) Temporal (!) 113 18 93 %   24 0850 -- -- --  control, adjustment of the mA and/or kV according to size, and the use of iterative reconstruction technique.  ______________________________________ Electronically signed by: JOANNA MACK M.D. Date:     09/03/2024 Time:    00:48          Assessment/Plan  Principal Problem:    Pneumonia due to other specified bacteria (HCC)  Active Problems:    Stroke-like symptoms    Hyperlipidemia    Acute ischemic stroke (HCC)    Hypotension    Diarrhea    Diabetes (HCC)    Essential hypertension    Hemiplegia affecting dominant side (HCC)    Stenosis of left carotid artery  Resolved Problems:    * No resolved hospital problems. *     Encouraged pt to use incentive spirometer    Acute CVA  Bilateral internal carotid artery stenosis  -- MRI reviewed, small acute infarcts in left parietal and frontal lobe  -- Neurology following  -- CTA neck showed ~90% left ICA stenosis - Carotid duplex also showed bilateral internal carotid stenosis - likely source of CVA  -- Vascular surgery consulted, considering carotid intervention either tomorrow or Saturday  -- Cardiology consulted for preoperative assessment - Echo showed normal EF without regional wall motion abnormalities - Lexiscan unremarkable - stable for OR from cardiology perspective  -- Continue ASA and statin; Stop Plavix and add Eliquis    Constipation  -- Opioid induced  -- Add Senokot 2 tabs daily, milk of mag daily  -- Pt had bowel movements    Left ear pain  -- Likely referred pain from left CEA; no obvious abnormality noted on otoscope exam    Right hip pain  -- Possibly sciatica - improves with PT  -- Lidocaine patch  -- PT/OT    Left lower lobe pneumonia  -- CT chest reviewed  -- Completed Azithromycin and Ceftriaxone    Afib with RVR  -- Continue Cardizem 300 mg daily  -- Increase Toprol to 50 mg daily  -- Eliquis added    Left lung nodules  -- Noted on CT chest w/ 1.2 cm precarinal lymph node - findings may be due to infection  -- Needs repeat CT chest in 3 months

## 2024-09-11 NOTE — PLAN OF CARE

## 2024-09-11 NOTE — CARE COORDINATION
[facility]  Notification of Admission Decision      [x] [facility]17      Please write discharge orders and summary prior to discharge.    [] Patient acceptance to Rehab pending the following :    [] Eval in progress       [] Patient determined to be ineligible for services at [facility] because :       We recommend you consider        Thank you for your referral, we appreciate you. If you have any questions, please feel   free to contact me at [phone].  Electronically Signed by Jaja Dorsey, Admissions Coordinator 9/11/2024 8:38 AM

## 2024-09-11 NOTE — PROGRESS NOTES
Occupational Therapy  Facility/Department: [facility] 4 ONCOLOGY UNIT  Daily Treatment Note  NAME: Mary Lira  : 1975  MRN: [id number]    Date of Service: 2024    Discharge Recommendations:  Patient would benefit from continued therapy after discharge       Assessment   Performance deficits / Impairments: Decreased functional mobility ;Decreased ADL status;Decreased balance;Decreased high-level IADLs  Assessment: Pt transfered from bed to chair with min assist x 2.  The patient would benefit from further therapy to upgrade functional status.  Treatment Diagnosis: PNA, acute ischemic stroke, right side weakness, L CEA  Activity Tolerance  Activity Tolerance: Patient Tolerated treatment well         Patient Diagnosis(es): The primary encounter diagnosis was Stroke-like symptoms. Diagnoses of Leukocytosis, unspecified type, Pneumonia due to infectious organism, unspecified laterality, unspecified part of lung, Carotid stenosis, left, Cerebrovascular accident (CVA), unspecified mechanism (HCC), Shortness of breath, and Left carotid artery stenosis were also pertinent to this visit.      has a past medical history of Anxiety, Apnea, CHF (congestive heart failure) (HCC), Diabetes mellitus (HCC), Hyperlipidemia, and Hypertension.   has a past surgical history that includes Appendectomy and Carotid endarterectomy (Left, 2024).    Restrictions  Restrictions/Precautions  Restrictions/Precautions: Fall Risk  Required Braces or Orthoses?: No  Position Activity Restriction  Other position/activity restrictions: room full with family staying here with pt.  Subjective   General  Chart Reviewed: Yes  Patient assessed for rehabilitation services?: Yes  Response to previous treatment: Patient with no complaints from previous session  Family / Caregiver Present: Yes (Dtr and grandson)  Pain Screening  Intervention List: Patient able to continue with treatment  Comments / Details: c/o neck and back pain, did not rate

## 2024-09-11 NOTE — PROGRESS NOTES
Patient:   Mary Lira  MR#:    [id number]   Room:    0434/434-02   YOB: 1975  Date of Progress Note: 9/11/2024  Time of Note                           6:34 AM  Consulting Physician:   Sandeep Diaz M.D.  Attending Physician:  Bashir Bhakta MD     Chief complaint Right-sided weakness/slurred speech/hypotension/hypoglycemia     S:This 48 y.o. female  with past medical history significant for atrial fibrillation not on anticoagulation, history of GI bleed, diabetes, CHF and hyperlipidemia is seen for evaluation of right-sided weakness and confusion.  On the day of admission the patient was last known well around 9 PM.  She went to sleep.  A few hours later the family awakened her when her Dexcom notified that her blood sugar was 30.  She was confused and disoriented when awoken.  She was found to have right-sided weakness and slurred speech.  She was brought in for evaluation.  She has been coughing for about 1 week along with some diarrhea.  She denies any previous history of stroke.  She is not on blood thinners.  CT of the head with no acute changes.  CT of the head and neck with no evidence of thrombus but with severe left carotid stenosis approximately approximately 80-90%.  She was transferred to the ICU and placed on pressors.  EKG with sinus rhythm.  Chest x-ray nodular opacities in the left upper lung.  Troponin borderline elevated.  Respiratory panel negative.  Serum creatinine elevated 1.2.  Discussions were held with ER physician approximately 4 hours and 15 minutes from time last known well.  Exam at that time revealed some drift in the right arm and right leg.  Movement improved on the right.  Atrial fibrillation noted.  Status post left carotid endarterectomy.  Doing well this morning without complaint.    REVIEW OF SYSTEMS:  Constitutional: No fevers No chills  Neck:No stiffness  Respiratory: No shortness of breath  Cardiovascular: No chest pain No palpitations  Gastrointestinal: No

## 2024-09-12 ENCOUNTER — HOSPITAL ENCOUNTER (INPATIENT)
Age: 49
LOS: 14 days | Discharge: HOME | End: 2024-09-26
Attending: PSYCHIATRY & NEUROLOGY | Admitting: PSYCHIATRY & NEUROLOGY
Payer: MEDICAID

## 2024-09-12 VITALS
DIASTOLIC BLOOD PRESSURE: 78 MMHG | SYSTOLIC BLOOD PRESSURE: 149 MMHG | TEMPERATURE: 97.7 F | OXYGEN SATURATION: 91 % | BODY MASS INDEX: 46.48 KG/M2 | RESPIRATION RATE: 18 BRPM | HEART RATE: 94 BPM | WEIGHT: 262.35 LBS | HEIGHT: 63 IN

## 2024-09-12 DIAGNOSIS — I63.9 ACUTE ISCHEMIC STROKE (HCC): Primary | ICD-10-CM

## 2024-09-12 LAB
ANION GAP SERPL CALCULATED.3IONS-SCNC: 10 MMOL/L (ref 7–19)
ANION GAP SERPL CALCULATED.3IONS-SCNC: 13 MMOL/L (ref 7–19)
BASOPHILS # BLD: 0.1 K/UL (ref 0–0.2)
BASOPHILS # BLD: 0.1 K/UL (ref 0–0.2)
BASOPHILS NFR BLD: 0.5 % (ref 0–1)
BASOPHILS NFR BLD: 0.6 % (ref 0–1)
BUN SERPL-MCNC: 25 MG/DL (ref 6–20)
BUN SERPL-MCNC: 28 MG/DL (ref 6–20)
CALCIUM SERPL-MCNC: 9 MG/DL (ref 8.6–10)
CALCIUM SERPL-MCNC: 9 MG/DL (ref 8.6–10)
CHLORIDE SERPL-SCNC: 95 MMOL/L (ref 98–111)
CHLORIDE SERPL-SCNC: 97 MMOL/L (ref 98–111)
CO2 SERPL-SCNC: 27 MMOL/L (ref 22–29)
CO2 SERPL-SCNC: 29 MMOL/L (ref 22–29)
CREAT SERPL-MCNC: 0.8 MG/DL (ref 0.5–0.9)
CREAT SERPL-MCNC: 0.9 MG/DL (ref 0.5–0.9)
EOSINOPHIL # BLD: 0.9 K/UL (ref 0–0.6)
EOSINOPHIL # BLD: 1 K/UL (ref 0–0.6)
EOSINOPHIL NFR BLD: 5.9 % (ref 0–5)
EOSINOPHIL NFR BLD: 6.9 % (ref 0–5)
ERYTHROCYTE [DISTWIDTH] IN BLOOD BY AUTOMATED COUNT: 14.7 % (ref 11.5–14.5)
ERYTHROCYTE [DISTWIDTH] IN BLOOD BY AUTOMATED COUNT: 14.8 % (ref 11.5–14.5)
GLUCOSE BLD-MCNC: 235 MG/DL (ref 70–99)
GLUCOSE BLD-MCNC: 260 MG/DL (ref 70–99)
GLUCOSE BLD-MCNC: 261 MG/DL (ref 70–99)
GLUCOSE BLD-MCNC: 332 MG/DL (ref 70–99)
GLUCOSE SERPL-MCNC: 247 MG/DL (ref 70–99)
GLUCOSE SERPL-MCNC: 278 MG/DL (ref 70–99)
HCT VFR BLD AUTO: 31.9 % (ref 37–47)
HCT VFR BLD AUTO: 32.3 % (ref 37–47)
HGB BLD-MCNC: 10 G/DL (ref 12–16)
HGB BLD-MCNC: 9.9 G/DL (ref 12–16)
IMM GRANULOCYTES # BLD: 0.1 K/UL
IMM GRANULOCYTES # BLD: 0.1 K/UL
LYMPHOCYTES # BLD: 3.1 K/UL (ref 1.1–4.5)
LYMPHOCYTES # BLD: 3.8 K/UL (ref 1.1–4.5)
LYMPHOCYTES NFR BLD: 19.5 % (ref 20–40)
LYMPHOCYTES NFR BLD: 26.8 % (ref 20–40)
MCH RBC QN AUTO: 24.9 PG (ref 27–31)
MCH RBC QN AUTO: 25.1 PG (ref 27–31)
MCHC RBC AUTO-ENTMCNC: 31 G/DL (ref 33–37)
MCHC RBC AUTO-ENTMCNC: 31 G/DL (ref 33–37)
MCV RBC AUTO: 80.4 FL (ref 81–99)
MCV RBC AUTO: 81 FL (ref 81–99)
MONOCYTES # BLD: 0.9 K/UL (ref 0–0.9)
MONOCYTES # BLD: 1.1 K/UL (ref 0–0.9)
MONOCYTES NFR BLD: 5.8 % (ref 0–10)
MONOCYTES NFR BLD: 7.5 % (ref 0–10)
NEUTROPHILS # BLD: 10.7 K/UL (ref 1.5–7.5)
NEUTROPHILS # BLD: 8.1 K/UL (ref 1.5–7.5)
NEUTS SEG NFR BLD: 57.6 % (ref 50–65)
NEUTS SEG NFR BLD: 67.8 % (ref 50–65)
PERFORMED ON: ABNORMAL
PLATELET # BLD AUTO: 368 K/UL (ref 130–400)
PLATELET # BLD AUTO: 430 K/UL (ref 130–400)
PMV BLD AUTO: 10.8 FL (ref 9.4–12.3)
PMV BLD AUTO: 11.2 FL (ref 9.4–12.3)
POTASSIUM SERPL-SCNC: 4.4 MMOL/L (ref 3.5–5)
POTASSIUM SERPL-SCNC: 5.6 MMOL/L (ref 3.5–5)
PREALB SERPL-MCNC: 16 MG/DL (ref 20–40)
RBC # BLD AUTO: 3.97 M/UL (ref 4.2–5.4)
RBC # BLD AUTO: 3.99 M/UL (ref 4.2–5.4)
SODIUM SERPL-SCNC: 134 MMOL/L (ref 136–145)
SODIUM SERPL-SCNC: 137 MMOL/L (ref 136–145)
WBC # BLD AUTO: 14 K/UL (ref 4.8–10.8)
WBC # BLD AUTO: 15.8 K/UL (ref 4.8–10.8)

## 2024-09-12 PROCEDURE — 85025 COMPLETE CBC W/AUTO DIFF WBC: CPT

## 2024-09-12 PROCEDURE — 82962 GLUCOSE BLOOD TEST: CPT

## 2024-09-12 PROCEDURE — 6370000000 HC RX 637 (ALT 250 FOR IP): Performed by: SURGERY

## 2024-09-12 PROCEDURE — 80048 BASIC METABOLIC PNL TOTAL CA: CPT

## 2024-09-12 PROCEDURE — 94760 N-INVAS EAR/PLS OXIMETRY 1: CPT

## 2024-09-12 PROCEDURE — 94150 VITAL CAPACITY TEST: CPT

## 2024-09-12 PROCEDURE — 36415 COLL VENOUS BLD VENIPUNCTURE: CPT

## 2024-09-12 PROCEDURE — 6370000000 HC RX 637 (ALT 250 FOR IP): Performed by: STUDENT IN AN ORGANIZED HEALTH CARE EDUCATION/TRAINING PROGRAM

## 2024-09-12 PROCEDURE — 1180000000 HC REHAB R&B

## 2024-09-12 PROCEDURE — 2580000003 HC RX 258: Performed by: PSYCHIATRY & NEUROLOGY

## 2024-09-12 PROCEDURE — 94640 AIRWAY INHALATION TREATMENT: CPT

## 2024-09-12 PROCEDURE — 6360000002 HC RX W HCPCS: Performed by: SURGERY

## 2024-09-12 PROCEDURE — 2580000003 HC RX 258: Performed by: SURGERY

## 2024-09-12 PROCEDURE — 84134 ASSAY OF PREALBUMIN: CPT

## 2024-09-12 RX ORDER — ASPIRIN 81 MG/1
81 TABLET, CHEWABLE ORAL DAILY
Status: CANCELLED | OUTPATIENT
Start: 2024-09-12

## 2024-09-12 RX ORDER — ACETAMINOPHEN 325 MG/1
650 TABLET ORAL EVERY 4 HOURS PRN
Status: DISCONTINUED | OUTPATIENT
Start: 2024-09-12 | End: 2024-09-26 | Stop reason: HOSPADM

## 2024-09-12 RX ORDER — BUTALBITAL, ACETAMINOPHEN AND CAFFEINE 50; 325; 40 MG/1; MG/1; MG/1
1 TABLET ORAL EVERY 6 HOURS PRN
Status: CANCELLED | OUTPATIENT
Start: 2024-09-12

## 2024-09-12 RX ORDER — FUROSEMIDE 20 MG
20 TABLET ORAL DAILY
Status: DISCONTINUED | OUTPATIENT
Start: 2024-09-13 | End: 2024-09-26 | Stop reason: HOSPADM

## 2024-09-12 RX ORDER — POTASSIUM CHLORIDE 1500 MG/1
40 TABLET, EXTENDED RELEASE ORAL PRN
Status: CANCELLED | OUTPATIENT
Start: 2024-09-12

## 2024-09-12 RX ORDER — SODIUM CHLORIDE 9 MG/ML
INJECTION, SOLUTION INTRAVENOUS PRN
Status: DISCONTINUED | OUTPATIENT
Start: 2024-09-12 | End: 2024-09-26 | Stop reason: HOSPADM

## 2024-09-12 RX ORDER — INSULIN GLARGINE 100 [IU]/ML
30 INJECTION, SOLUTION SUBCUTANEOUS DAILY
Status: DISCONTINUED | OUTPATIENT
Start: 2024-09-13 | End: 2024-09-26 | Stop reason: HOSPADM

## 2024-09-12 RX ORDER — ACETAMINOPHEN 325 MG/1
650 TABLET ORAL EVERY 4 HOURS PRN
Status: CANCELLED | OUTPATIENT
Start: 2024-09-12

## 2024-09-12 RX ORDER — SERTRALINE HYDROCHLORIDE 100 MG/1
100 TABLET, FILM COATED ORAL DAILY
Status: DISCONTINUED | OUTPATIENT
Start: 2024-09-13 | End: 2024-09-26 | Stop reason: HOSPADM

## 2024-09-12 RX ORDER — ATORVASTATIN CALCIUM 40 MG/1
40 TABLET, FILM COATED ORAL NIGHTLY
Status: DISCONTINUED | OUTPATIENT
Start: 2024-09-12 | End: 2024-09-26 | Stop reason: HOSPADM

## 2024-09-12 RX ORDER — SODIUM CHLORIDE 9 MG/ML
INJECTION, SOLUTION INTRAVENOUS PRN
Status: CANCELLED | OUTPATIENT
Start: 2024-09-12

## 2024-09-12 RX ORDER — INSULIN LISPRO 100 [IU]/ML
0-4 INJECTION, SOLUTION INTRAVENOUS; SUBCUTANEOUS
Status: DISCONTINUED | OUTPATIENT
Start: 2024-09-12 | End: 2024-09-26 | Stop reason: HOSPADM

## 2024-09-12 RX ORDER — BENZONATATE 100 MG/1
100 CAPSULE ORAL 3 TIMES DAILY PRN
Status: CANCELLED | OUTPATIENT
Start: 2024-09-12

## 2024-09-12 RX ORDER — INSULIN LISPRO 100 [IU]/ML
0-4 INJECTION, SOLUTION INTRAVENOUS; SUBCUTANEOUS NIGHTLY
Status: DISCONTINUED | OUTPATIENT
Start: 2024-09-12 | End: 2024-09-26 | Stop reason: HOSPADM

## 2024-09-12 RX ORDER — HYDROCODONE BITARTRATE AND ACETAMINOPHEN 7.5; 325 MG/1; MG/1
1 TABLET ORAL EVERY 4 HOURS PRN
Status: CANCELLED | OUTPATIENT
Start: 2024-09-12

## 2024-09-12 RX ORDER — METOPROLOL SUCCINATE 50 MG/1
50 TABLET, EXTENDED RELEASE ORAL DAILY
Status: CANCELLED | OUTPATIENT
Start: 2024-09-12

## 2024-09-12 RX ORDER — IPRATROPIUM BROMIDE AND ALBUTEROL SULFATE 2.5; .5 MG/3ML; MG/3ML
1 SOLUTION RESPIRATORY (INHALATION) EVERY 4 HOURS PRN
Status: DISCONTINUED | OUTPATIENT
Start: 2024-09-12 | End: 2024-09-26 | Stop reason: HOSPADM

## 2024-09-12 RX ORDER — TRAZODONE HYDROCHLORIDE 100 MG/1
100 TABLET ORAL NIGHTLY
Status: DISCONTINUED | OUTPATIENT
Start: 2024-09-12 | End: 2024-09-26 | Stop reason: HOSPADM

## 2024-09-12 RX ORDER — DEXTROSE MONOHYDRATE 100 MG/ML
INJECTION, SOLUTION INTRAVENOUS CONTINUOUS PRN
Status: DISCONTINUED | OUTPATIENT
Start: 2024-09-12 | End: 2024-09-26 | Stop reason: HOSPADM

## 2024-09-12 RX ORDER — INSULIN LISPRO 100 [IU]/ML
3 INJECTION, SOLUTION INTRAVENOUS; SUBCUTANEOUS
Status: DISCONTINUED | OUTPATIENT
Start: 2024-09-12 | End: 2024-09-26 | Stop reason: HOSPADM

## 2024-09-12 RX ORDER — BUTALBITAL, ACETAMINOPHEN AND CAFFEINE 50; 325; 40 MG/1; MG/1; MG/1
1 TABLET ORAL EVERY 6 HOURS PRN
Status: DISCONTINUED | OUTPATIENT
Start: 2024-09-12 | End: 2024-09-26 | Stop reason: HOSPADM

## 2024-09-12 RX ORDER — SENNA AND DOCUSATE SODIUM 50; 8.6 MG/1; MG/1
1 TABLET, FILM COATED ORAL 2 TIMES DAILY
Status: DISCONTINUED | OUTPATIENT
Start: 2024-09-12 | End: 2024-09-12

## 2024-09-12 RX ORDER — IPRATROPIUM BROMIDE AND ALBUTEROL SULFATE 2.5; .5 MG/3ML; MG/3ML
1 SOLUTION RESPIRATORY (INHALATION) EVERY 4 HOURS PRN
Status: CANCELLED | OUTPATIENT
Start: 2024-09-12

## 2024-09-12 RX ORDER — ACETAMINOPHEN 325 MG/1
650 TABLET ORAL EVERY 4 HOURS PRN
Status: DISCONTINUED | OUTPATIENT
Start: 2024-09-12 | End: 2024-09-13 | Stop reason: SDUPTHER

## 2024-09-12 RX ORDER — SODIUM CHLORIDE 0.9 % (FLUSH) 0.9 %
5-40 SYRINGE (ML) INJECTION 2 TIMES DAILY
Status: DISCONTINUED | OUTPATIENT
Start: 2024-09-12 | End: 2024-09-13

## 2024-09-12 RX ORDER — HYDROCODONE BITARTRATE AND ACETAMINOPHEN 7.5; 325 MG/1; MG/1
1 TABLET ORAL EVERY 4 HOURS PRN
Status: DISCONTINUED | OUTPATIENT
Start: 2024-09-12 | End: 2024-09-26 | Stop reason: HOSPADM

## 2024-09-12 RX ORDER — GABAPENTIN 300 MG/1
300 CAPSULE ORAL 3 TIMES DAILY
Status: CANCELLED | OUTPATIENT
Start: 2024-09-12

## 2024-09-12 RX ORDER — INSULIN GLARGINE 100 [IU]/ML
30 INJECTION, SOLUTION SUBCUTANEOUS DAILY
Status: CANCELLED | OUTPATIENT
Start: 2024-09-12

## 2024-09-12 RX ORDER — POTASSIUM CHLORIDE 7.45 MG/ML
10 INJECTION INTRAVENOUS PRN
Status: CANCELLED | OUTPATIENT
Start: 2024-09-12

## 2024-09-12 RX ORDER — INSULIN LISPRO 100 [IU]/ML
0-4 INJECTION, SOLUTION INTRAVENOUS; SUBCUTANEOUS NIGHTLY
Status: CANCELLED | OUTPATIENT
Start: 2024-09-12

## 2024-09-12 RX ORDER — FUROSEMIDE 20 MG
20 TABLET ORAL DAILY
Status: CANCELLED | OUTPATIENT
Start: 2024-09-12

## 2024-09-12 RX ORDER — POLYETHYLENE GLYCOL 3350 17 G/17G
17 POWDER, FOR SOLUTION ORAL DAILY PRN
Status: DISCONTINUED | OUTPATIENT
Start: 2024-09-12 | End: 2024-09-26 | Stop reason: HOSPADM

## 2024-09-12 RX ORDER — LIDOCAINE 4 G/G
1 PATCH TOPICAL EVERY 24 HOURS
Status: DISCONTINUED | OUTPATIENT
Start: 2024-09-12 | End: 2024-09-14

## 2024-09-12 RX ORDER — INSULIN GLARGINE 100 [IU]/ML
25 INJECTION, SOLUTION SUBCUTANEOUS DAILY
Status: CANCELLED | OUTPATIENT
Start: 2024-09-12

## 2024-09-12 RX ORDER — INSULIN LISPRO 100 [IU]/ML
3 INJECTION, SOLUTION INTRAVENOUS; SUBCUTANEOUS
Status: CANCELLED | OUTPATIENT
Start: 2024-09-12

## 2024-09-12 RX ORDER — BUSPIRONE HYDROCHLORIDE 5 MG/1
5 TABLET ORAL DAILY
Status: DISCONTINUED | OUTPATIENT
Start: 2024-09-13 | End: 2024-09-26 | Stop reason: HOSPADM

## 2024-09-12 RX ORDER — INSULIN LISPRO 100 [IU]/ML
0-4 INJECTION, SOLUTION INTRAVENOUS; SUBCUTANEOUS
Status: CANCELLED | OUTPATIENT
Start: 2024-09-12

## 2024-09-12 RX ORDER — INSULIN GLARGINE 100 [IU]/ML
30 INJECTION, SOLUTION SUBCUTANEOUS DAILY
Status: DISCONTINUED | OUTPATIENT
Start: 2024-09-12 | End: 2024-09-12 | Stop reason: HOSPADM

## 2024-09-12 RX ORDER — DEXTROSE MONOHYDRATE 100 MG/ML
INJECTION, SOLUTION INTRAVENOUS CONTINUOUS PRN
Status: CANCELLED | OUTPATIENT
Start: 2024-09-12

## 2024-09-12 RX ORDER — SENNA AND DOCUSATE SODIUM 50; 8.6 MG/1; MG/1
2 TABLET, FILM COATED ORAL DAILY
Status: CANCELLED | OUTPATIENT
Start: 2024-09-12

## 2024-09-12 RX ORDER — TRAZODONE HYDROCHLORIDE 100 MG/1
100 TABLET ORAL NIGHTLY
Status: CANCELLED | OUTPATIENT
Start: 2024-09-12

## 2024-09-12 RX ORDER — ASPIRIN 81 MG/1
81 TABLET, CHEWABLE ORAL DAILY
Status: DISCONTINUED | OUTPATIENT
Start: 2024-09-13 | End: 2024-09-26 | Stop reason: HOSPADM

## 2024-09-12 RX ORDER — METOPROLOL SUCCINATE 50 MG/1
50 TABLET, EXTENDED RELEASE ORAL DAILY
Status: DISCONTINUED | OUTPATIENT
Start: 2024-09-13 | End: 2024-09-26 | Stop reason: HOSPADM

## 2024-09-12 RX ORDER — BENZONATATE 100 MG/1
100 CAPSULE ORAL 3 TIMES DAILY PRN
Status: DISCONTINUED | OUTPATIENT
Start: 2024-09-12 | End: 2024-09-26 | Stop reason: HOSPADM

## 2024-09-12 RX ORDER — ONDANSETRON 2 MG/ML
4 INJECTION INTRAMUSCULAR; INTRAVENOUS EVERY 6 HOURS PRN
Status: CANCELLED | OUTPATIENT
Start: 2024-09-12

## 2024-09-12 RX ORDER — ONDANSETRON 2 MG/ML
4 INJECTION INTRAMUSCULAR; INTRAVENOUS EVERY 6 HOURS PRN
Status: DISCONTINUED | OUTPATIENT
Start: 2024-09-12 | End: 2024-09-26 | Stop reason: HOSPADM

## 2024-09-12 RX ORDER — SENNA AND DOCUSATE SODIUM 50; 8.6 MG/1; MG/1
2 TABLET, FILM COATED ORAL DAILY
Status: DISCONTINUED | OUTPATIENT
Start: 2024-09-13 | End: 2024-09-26 | Stop reason: HOSPADM

## 2024-09-12 RX ORDER — LIDOCAINE 4 G/G
1 PATCH TOPICAL EVERY 24 HOURS
Status: CANCELLED | OUTPATIENT
Start: 2024-09-12

## 2024-09-12 RX ORDER — POLYETHYLENE GLYCOL 3350 17 G/17G
17 POWDER, FOR SOLUTION ORAL DAILY PRN
Status: CANCELLED | OUTPATIENT
Start: 2024-09-12

## 2024-09-12 RX ORDER — BISACODYL 10 MG
10 SUPPOSITORY, RECTAL RECTAL DAILY PRN
Status: DISCONTINUED | OUTPATIENT
Start: 2024-09-12 | End: 2024-09-26 | Stop reason: HOSPADM

## 2024-09-12 RX ORDER — ATORVASTATIN CALCIUM 20 MG/1
40 TABLET, FILM COATED ORAL NIGHTLY
Status: CANCELLED | OUTPATIENT
Start: 2024-09-12

## 2024-09-12 RX ORDER — BUSPIRONE HYDROCHLORIDE 5 MG/1
5 TABLET ORAL DAILY
Status: CANCELLED | OUTPATIENT
Start: 2024-09-12

## 2024-09-12 RX ORDER — POTASSIUM CHLORIDE 1500 MG/1
40 TABLET, EXTENDED RELEASE ORAL PRN
Status: DISCONTINUED | OUTPATIENT
Start: 2024-09-12 | End: 2024-09-26 | Stop reason: HOSPADM

## 2024-09-12 RX ORDER — POTASSIUM CHLORIDE 7.45 MG/ML
10 INJECTION INTRAVENOUS PRN
Status: DISCONTINUED | OUTPATIENT
Start: 2024-09-12 | End: 2024-09-26 | Stop reason: HOSPADM

## 2024-09-12 RX ORDER — GABAPENTIN 300 MG/1
300 CAPSULE ORAL 3 TIMES DAILY
Status: DISCONTINUED | OUTPATIENT
Start: 2024-09-12 | End: 2024-09-16

## 2024-09-12 RX ADMIN — IPRATROPIUM BROMIDE AND ALBUTEROL SULFATE 1 DOSE: 2.5; .5 SOLUTION RESPIRATORY (INHALATION) at 10:30

## 2024-09-12 RX ADMIN — INSULIN LISPRO 3 UNITS: 100 INJECTION, SOLUTION INTRAVENOUS; SUBCUTANEOUS at 14:04

## 2024-09-12 RX ADMIN — HYDROCODONE BITARTRATE AND ACETAMINOPHEN 1 TABLET: 7.5; 325 TABLET ORAL at 10:28

## 2024-09-12 RX ADMIN — SODIUM CHLORIDE, PRESERVATIVE FREE 10 ML: 5 INJECTION INTRAVENOUS at 20:36

## 2024-09-12 RX ADMIN — BUSPIRONE HYDROCHLORIDE 5 MG: 5 TABLET ORAL at 10:33

## 2024-09-12 RX ADMIN — GABAPENTIN 300 MG: 300 CAPSULE ORAL at 20:33

## 2024-09-12 RX ADMIN — INSULIN LISPRO 3 UNITS: 100 INJECTION, SOLUTION INTRAVENOUS; SUBCUTANEOUS at 10:34

## 2024-09-12 RX ADMIN — GABAPENTIN 300 MG: 300 CAPSULE ORAL at 10:32

## 2024-09-12 RX ADMIN — INSULIN LISPRO 3 UNITS: 100 INJECTION, SOLUTION INTRAVENOUS; SUBCUTANEOUS at 10:31

## 2024-09-12 RX ADMIN — HYDROCODONE BITARTRATE AND ACETAMINOPHEN 1 TABLET: 7.5; 325 TABLET ORAL at 20:33

## 2024-09-12 RX ADMIN — APIXABAN 5 MG: 5 TABLET, FILM COATED ORAL at 10:33

## 2024-09-12 RX ADMIN — GABAPENTIN 300 MG: 300 CAPSULE ORAL at 14:04

## 2024-09-12 RX ADMIN — HYDROMORPHONE HYDROCHLORIDE 0.25 MG: 1 INJECTION, SOLUTION INTRAMUSCULAR; INTRAVENOUS; SUBCUTANEOUS at 14:05

## 2024-09-12 RX ADMIN — HYDROMORPHONE HYDROCHLORIDE 0.5 MG: 1 INJECTION, SOLUTION INTRAMUSCULAR; INTRAVENOUS; SUBCUTANEOUS at 03:43

## 2024-09-12 RX ADMIN — ATORVASTATIN CALCIUM 40 MG: 40 TABLET, FILM COATED ORAL at 20:33

## 2024-09-12 RX ADMIN — TRAZODONE HYDROCHLORIDE 100 MG: 100 TABLET ORAL at 20:33

## 2024-09-12 RX ADMIN — SODIUM CHLORIDE, PRESERVATIVE FREE 10 ML: 5 INJECTION INTRAVENOUS at 10:31

## 2024-09-12 RX ADMIN — ASPIRIN 81 MG CHEWABLE TABLET 81 MG: 81 TABLET CHEWABLE at 10:32

## 2024-09-12 RX ADMIN — SENNOSIDES AND DOCUSATE SODIUM 2 TABLET: 50; 8.6 TABLET ORAL at 10:32

## 2024-09-12 RX ADMIN — INSULIN LISPRO 2 UNITS: 100 INJECTION, SOLUTION INTRAVENOUS; SUBCUTANEOUS at 14:06

## 2024-09-12 RX ADMIN — FUROSEMIDE 20 MG: 20 TABLET ORAL at 10:32

## 2024-09-12 RX ADMIN — SERTRALINE HYDROCHLORIDE 100 MG: 100 TABLET ORAL at 10:33

## 2024-09-12 RX ADMIN — DILTIAZEM HYDROCHLORIDE 300 MG: 180 CAPSULE, COATED, EXTENDED RELEASE ORAL at 10:32

## 2024-09-12 RX ADMIN — APIXABAN 5 MG: 5 TABLET, FILM COATED ORAL at 20:33

## 2024-09-12 RX ADMIN — INSULIN GLARGINE 30 UNITS: 100 INJECTION, SOLUTION SUBCUTANEOUS at 10:38

## 2024-09-12 RX ADMIN — IPRATROPIUM BROMIDE AND ALBUTEROL SULFATE 1 DOSE: 2.5; .5 SOLUTION RESPIRATORY (INHALATION) at 07:05

## 2024-09-12 RX ADMIN — METOPROLOL SUCCINATE 50 MG: 50 TABLET, EXTENDED RELEASE ORAL at 10:33

## 2024-09-12 RX ADMIN — IPRATROPIUM BROMIDE AND ALBUTEROL SULFATE 1 DOSE: 2.5; .5 SOLUTION RESPIRATORY (INHALATION) at 14:20

## 2024-09-12 SDOH — ECONOMIC STABILITY: FOOD INSECURITY: WITHIN THE PAST 12 MONTHS, YOU WORRIED THAT YOUR FOOD WOULD RUN OUT BEFORE YOU GOT MONEY TO BUY MORE.: NEVER TRUE

## 2024-09-12 SDOH — ECONOMIC STABILITY: INCOME INSECURITY: IN THE PAST 12 MONTHS, HAS THE ELECTRIC, GAS, OIL, OR WATER COMPANY THREATENED TO SHUT OFF SERVICE IN YOUR HOME?: NO

## 2024-09-12 SDOH — ECONOMIC STABILITY: INCOME INSECURITY: HOW HARD IS IT FOR YOU TO PAY FOR THE VERY BASICS LIKE FOOD, HOUSING, MEDICAL CARE, AND HEATING?: NOT VERY HARD

## 2024-09-12 ASSESSMENT — PATIENT HEALTH QUESTIONNAIRE - PHQ9
SUM OF ALL RESPONSES TO PHQ9 QUESTIONS 1 & 2: 0
SUM OF ALL RESPONSES TO PHQ QUESTIONS 1-9: 0
SUM OF ALL RESPONSES TO PHQ QUESTIONS 1-9: 0
2. FEELING DOWN, DEPRESSED OR HOPELESS: NOT AT ALL
1. LITTLE INTEREST OR PLEASURE IN DOING THINGS: NOT AT ALL
SUM OF ALL RESPONSES TO PHQ QUESTIONS 1-9: 0
SUM OF ALL RESPONSES TO PHQ QUESTIONS 1-9: 0

## 2024-09-12 ASSESSMENT — PAIN SCALES - GENERAL
PAINLEVEL_OUTOF10: 8
PAINLEVEL_OUTOF10: 6
PAINLEVEL_OUTOF10: 4
PAINLEVEL_OUTOF10: 9
PAINLEVEL_OUTOF10: 8
PAINLEVEL_OUTOF10: 7

## 2024-09-12 ASSESSMENT — PAIN DESCRIPTION - LOCATION
LOCATION: NECK
LOCATION: BACK;NECK
LOCATION: NECK
LOCATION: BACK;NECK
LOCATION: NECK

## 2024-09-12 ASSESSMENT — PAIN DESCRIPTION - ORIENTATION
ORIENTATION: LEFT;LOWER
ORIENTATION: LEFT
ORIENTATION: POSTERIOR;MID

## 2024-09-12 ASSESSMENT — PAIN DESCRIPTION - DESCRIPTORS
DESCRIPTORS: ACHING
DESCRIPTORS: SHARP;STABBING

## 2024-09-12 NOTE — DISCHARGE SUMMARY
[address] [place] [address]    DEPARTMENT OF HOSPITALIST MEDICINE      DISCHARGE SUMMARY:      PATIENT NAME:  Mary Lira  :  1975  MRN:  [id number]    Admission Date:   9/3/2024 12:28 AM Attending: Bashir Bhakta MD   Discharge Date:   2024              PCP: Bruno Manning MD  Length of Stay: 9 days     Chief Complaint on Admission:   Chief Complaint   Patient presents with    Seizures    Hypoglycemia           Altered Mental Status       Consultants:     IP CONSULT TO NEUROLOGY  IP CONSULT TO VASCULAR ACCESS TEAM  IP CONSULT TO VASCULAR SURGERY  IP CONSULT TO CARDIOLOGY  IP CONSULT TO REHAB/TCU ADMISSION COORDINATOR       CUMULATIVE  HOSPITAL  COURSE  AND  TREATMENT:  49 yo F with T2DM, CHF, HTN, ESTEVAN who presented to [facility] ED with AMS, slurred speech, and right sided weakness.  CT head negative for acute abnormalities.  Admitted to hospitalist service for further management.   MRI brain showed 2 small foci of acute infarction within left parietal and left frontal lobe.   CTA neck showed ~90% stenosis of left ICA. Vascular surgery was consulted and considering carotid intervention.  Cardiology consulted for preoperative assessment. Echo showed EF 55-60% without regional wall motion abnormalities, planning for Lexiscan tomorrow.     Pt reported productive cough and dyspnea. CXR showed left sided opacities/nodules, CT recommended for further evaluation. Pt started on empiric Azithromycin and Ceftriaxone.   CT chest showed possible pulmonary edema, left lower lobe pneumonia, and multiple left sided pulmonary nodules with the largest measuring 1.1 cm. Pt will need repeat CT chest in 3 months for surveillance.      Developed Afib with RVR. Converted to sinus rhythm with IV Cardizem push.   Started on Eliquis for AC post-op. Plavix discontinued, but continue ASA.     Insulin titrated upwards through admission, continue Lantus 25 units daily, prandial insulin, and sliding scale at rehab. Can  non-cardiogenic  -- Given 20 mg IV Lasix x1  -- Restart home Lasix 20 mg PO daily     Hyperkalemia, resolved  -- Mildly elevated K at 5.6 -> 5.5 on repeat  -- Lasix as above  -- Low K diet for now  -- Daily BMP     T2DM  -- Increase Lantus to 25 units daily - PO intake has increased - takes 40 units daily at home  -- Add Prandial Lispro 3 units TIDAC  -- Continue low SSI  -- Continue Gabapentin for neuropathy      OBJECTIVE:  BP (!) 149/78   Pulse 94   Temp 97.7 °F (36.5 °C) (Temporal)   Resp 20   Ht 1.6 m (5' 2.99\")   Wt 119 kg (262 lb 5.6 oz)   SpO2 91%   BMI 46.49 kg/m²       Heart: RRR   Lungs: Bilateral fair air entry   Abdomen: Soft, non-tender   Extremities: No edema   Neurologic: Alert and oriented   Skin: Warm and dry          Laboratory Data:  Recent Labs     09/10/24  0809 09/11/24  0550 09/12/24  0345   WBC 13.8* 13.5* 14.0*   HGB 10.6* 9.9* 10.0*    373 368     Recent Labs     09/11/24  0550 09/12/24  0345 09/12/24  1042    134* 137   K 4.7 5.6* 4.4   CL 96* 97* 95*   CO2 28 27 29   BUN 17 28* 25*   CREATININE 0.6 0.9 0.8   GLUCOSE 212* 278* 247*     No results for input(s): \"AST\", \"ALT\", \"BILITOT\", \"ALKPHOS\" in the last 72 hours.    Invalid input(s): \"ALB\"  Troponin T: No results for input(s): \"TROPONINI\" in the last 72 hours.  Pro-BNP: No results for input(s): \"BNP\" in the last 72 hours.  INR: No results for input(s): \"INR\" in the last 72 hours.  UA:No results for input(s): \"NITRITE\", \"COLORU\", \"PHUR\", \"LABCAST\", \"WBCUA\", \"RBCUA\", \"MUCUS\", \"TRICHOMONAS\", \"YEAST\", \"BACTERIA\", \"CLARITYU\", \"SPECGRAV\", \"LEUKOCYTESUR\", \"UROBILINOGEN\", \"BILIRUBINUR\", \"BLOODU\", \"GLUCOSEU\", \"AMORPHOUS\" in the last 72 hours.    Invalid input(s): \"KETONESU\"  A1C: No results for input(s): \"LABA1C\" in the last 72 hours.  ABG:No results for input(s): \"PHART\", \"SBG4YDD\", \"PO2ART\", \"RME3WDJ\", \"BEART\", \"HGBAE\", \"I4EBDUIG\", \"CARBOXHGBART\" in the last 72 hours.         Impressions of imaging performed in 48 hours

## 2024-09-12 NOTE — PROGRESS NOTES
Mary Lira arrived to room # 817.   Presented with: CVA  Mental Status: Patient is oriented and alert.   Vitals:    09/12/24 1516   BP: 128/61   Pulse: 85   Resp: 20   Temp: 97.7 °F (36.5 °C)   SpO2: 94%     Patient safety contract and falls prevention contract reviewed with patient Yes.  Oriented Patient to room.  Call light within reach. Yes.  Needs, issues or concerns expressed at this time: no.      Electronically signed by Stella Faust LPN on 9/12/2024 at 4:37 PM

## 2024-09-12 NOTE — PLAN OF CARE
[facility] INPATIENT REHAB TREATMENT PLAN      Mary Lira    : 1975  [facility] #: [id number]  MRN: [id number]   PHYSICIAN:  Dario Triplett MD  Primary Problem    Patient Active Problem List   Diagnosis    Hypertension    Hyperlipidemia    Pneumonia due to other specified bacteria (HCC)    Acute ischemic stroke (HCC)    Hypotension    Diarrhea    Diabetes (HCC)    Essential hypertension    Hemiplegia affecting dominant side (HCC)    Stenosis of left carotid artery    Stroke-like symptoms    Palliative care patient       Rehabilitation Diagnosis:     Acute ischemic stroke (HCC) [I63.9]       ADMIT DATE:2024   CARE PLAN     NURSING:  Mary Moore while on this unit will:     [x] Be continent of bowel and bladder      [x] Have an adequate number of bowel movements   [] Urinate with no urinary retention >300ml in bladder   [] Complete bladder protocol with long removal   [] Maintain O2 SATs at ___%   [x] Have pain managed while on ARU        [x] Be pain free by discharge    [x] Have no skin breakdown while on ARU   [x] Have improved skin integrity via wound measurements   [x] Have no signs/symptoms of infection at the wound site   [x] Be free from injury during hospitalization    [x] Complete education with patient/family with understanding demonstrated for:   [] Adjustment    [] Other:   Nursing interventions may include bowel/bladder training, education for medical assistive devices, medication education, O2 saturation management, energy conservation, stress management techniques, fall prevention, alarms protocol, seating and positioning, skin/wound care, pressure relief instruction,dressing changes,  infection protection, DVT prophylaxis, and/or assistance with in room safety with transfers to bed, toilet, wheelchair, shower as well as bathroom activities and hygiene.     Patient/caregiver education for:   [x] Disease/sustained injury/management      [x] Medication Use   [] Surgical intervention   [x]  will complete ambulatory home making task with supervision  Short Term Goal 5: patient will complete 1-2 handed standing task for 5 mins with supervision  Short Term Goal 6: patient will complete HEP x 5 occasions to improve strength and endurance for ADLs :  Long Term Goals  Time Frame for Long Term Goals : 2 weeks  Long Term Goal 1: patient will complete overall dressing with modified independence  Long Term Goal 2: patient will complete overall toileting with modified independence  Long Term Goal 3: patient will complete overall bathing with modified independence  Long Term Goal 4: patient will complete HEP with independence  Long Term Goal 5: patient will complete ambulatory home making task with modified independence :    These goals were reviewed with this patient at the time of assessment and Mary Lira is in agreement    Plan of Care: Frequency:   [x] 5 days per week, 90 minutes per day     [] 5 days per week, 60 minutes per day                Occupational Therapy Plan  Current Treatment Recommendations: Strengthening, Balance training, Endurance training, Equipment evaluation, education, & procurement, Patient/Caregiver education & training, Safety education & training, Self-Care / ADL, Home management training, Coordination training            IRF-GENO  Eating  Assistance Needed: Setup or clean-up assistance  CARE Score: 5  Discharge Goal: Independent  Oral Hygiene  Assistance Needed: Setup or clean-up assistance  CARE Score: 5  Discharge Goal: Independent  Toileting Hygiene  Assistance needed: Partial/moderate assistance  Comment: min A  CARE Score: 3  Discharge Goal: Independent  Shower/Bathe Self  Assistance Needed: Partial/moderate assistance  Comment: shower, min A  CARE Score: 3  Discharge Goal: Independent  Upper Body Dressing  Assistance Needed: Setup or clean-up assistance  CARE Score: 5  Discharge Goal: Independent  Lower Body Dressing  Assistance Needed: Partial/moderate assistance  Comment:

## 2024-09-13 PROBLEM — Z51.5 PALLIATIVE CARE PATIENT: Status: ACTIVE | Noted: 2024-09-13

## 2024-09-13 LAB
GLUCOSE BLD-MCNC: 189 MG/DL (ref 70–99)
GLUCOSE BLD-MCNC: 219 MG/DL (ref 70–99)
GLUCOSE BLD-MCNC: 221 MG/DL (ref 70–99)
GLUCOSE BLD-MCNC: 270 MG/DL (ref 70–99)
PERFORMED ON: ABNORMAL

## 2024-09-13 PROCEDURE — 94760 N-INVAS EAR/PLS OXIMETRY 1: CPT

## 2024-09-13 PROCEDURE — 97116 GAIT TRAINING THERAPY: CPT

## 2024-09-13 PROCEDURE — 97530 THERAPEUTIC ACTIVITIES: CPT

## 2024-09-13 PROCEDURE — 99223 1ST HOSP IP/OBS HIGH 75: CPT | Performed by: PSYCHIATRY & NEUROLOGY

## 2024-09-13 PROCEDURE — 6370000000 HC RX 637 (ALT 250 FOR IP): Performed by: STUDENT IN AN ORGANIZED HEALTH CARE EDUCATION/TRAINING PROGRAM

## 2024-09-13 PROCEDURE — 97535 SELF CARE MNGMENT TRAINING: CPT

## 2024-09-13 PROCEDURE — 97161 PT EVAL LOW COMPLEX 20 MIN: CPT

## 2024-09-13 PROCEDURE — 1180000000 HC REHAB R&B

## 2024-09-13 PROCEDURE — 97110 THERAPEUTIC EXERCISES: CPT

## 2024-09-13 PROCEDURE — 82962 GLUCOSE BLOOD TEST: CPT

## 2024-09-13 PROCEDURE — 2580000003 HC RX 258: Performed by: PSYCHIATRY & NEUROLOGY

## 2024-09-13 PROCEDURE — 97165 OT EVAL LOW COMPLEX 30 MIN: CPT

## 2024-09-13 PROCEDURE — 6370000000 HC RX 637 (ALT 250 FOR IP): Performed by: PSYCHIATRY & NEUROLOGY

## 2024-09-13 RX ORDER — LIDOCAINE 4 G/G
1 PATCH TOPICAL DAILY
Status: DISCONTINUED | OUTPATIENT
Start: 2024-09-13 | End: 2024-09-13

## 2024-09-13 RX ADMIN — TRAZODONE HYDROCHLORIDE 100 MG: 100 TABLET ORAL at 20:11

## 2024-09-13 RX ADMIN — BUSPIRONE HYDROCHLORIDE 5 MG: 5 TABLET ORAL at 08:17

## 2024-09-13 RX ADMIN — METOPROLOL SUCCINATE 50 MG: 50 TABLET, EXTENDED RELEASE ORAL at 08:17

## 2024-09-13 RX ADMIN — GABAPENTIN 300 MG: 300 CAPSULE ORAL at 12:21

## 2024-09-13 RX ADMIN — SERTRALINE HYDROCHLORIDE 100 MG: 100 TABLET ORAL at 08:17

## 2024-09-13 RX ADMIN — DILTIAZEM HYDROCHLORIDE 300 MG: 180 CAPSULE, COATED, EXTENDED RELEASE ORAL at 08:17

## 2024-09-13 RX ADMIN — SENNOSIDES AND DOCUSATE SODIUM 2 TABLET: 50; 8.6 TABLET ORAL at 08:21

## 2024-09-13 RX ADMIN — INSULIN LISPRO 3 UNITS: 100 INJECTION, SOLUTION INTRAVENOUS; SUBCUTANEOUS at 08:17

## 2024-09-13 RX ADMIN — HYDROCODONE BITARTRATE AND ACETAMINOPHEN 1 TABLET: 7.5; 325 TABLET ORAL at 20:11

## 2024-09-13 RX ADMIN — INSULIN LISPRO 2 UNITS: 100 INJECTION, SOLUTION INTRAVENOUS; SUBCUTANEOUS at 12:21

## 2024-09-13 RX ADMIN — TIZANIDINE 2 MG: 4 TABLET ORAL at 23:01

## 2024-09-13 RX ADMIN — INSULIN LISPRO 1 UNITS: 100 INJECTION, SOLUTION INTRAVENOUS; SUBCUTANEOUS at 08:22

## 2024-09-13 RX ADMIN — GABAPENTIN 300 MG: 300 CAPSULE ORAL at 20:11

## 2024-09-13 RX ADMIN — APIXABAN 5 MG: 5 TABLET, FILM COATED ORAL at 08:17

## 2024-09-13 RX ADMIN — APIXABAN 5 MG: 5 TABLET, FILM COATED ORAL at 20:11

## 2024-09-13 RX ADMIN — TIZANIDINE 4 MG: 4 TABLET ORAL at 15:19

## 2024-09-13 RX ADMIN — HYDROCODONE BITARTRATE AND ACETAMINOPHEN 1 TABLET: 7.5; 325 TABLET ORAL at 12:21

## 2024-09-13 RX ADMIN — INSULIN GLARGINE 30 UNITS: 100 INJECTION, SOLUTION SUBCUTANEOUS at 08:17

## 2024-09-13 RX ADMIN — FUROSEMIDE 20 MG: 20 TABLET ORAL at 08:17

## 2024-09-13 RX ADMIN — INSULIN LISPRO 3 UNITS: 100 INJECTION, SOLUTION INTRAVENOUS; SUBCUTANEOUS at 12:21

## 2024-09-13 RX ADMIN — ATORVASTATIN CALCIUM 40 MG: 40 TABLET, FILM COATED ORAL at 20:11

## 2024-09-13 RX ADMIN — HYDROCODONE BITARTRATE AND ACETAMINOPHEN 1 TABLET: 7.5; 325 TABLET ORAL at 05:29

## 2024-09-13 RX ADMIN — TIZANIDINE 4 MG: 4 TABLET ORAL at 08:26

## 2024-09-13 RX ADMIN — GABAPENTIN 300 MG: 300 CAPSULE ORAL at 08:17

## 2024-09-13 RX ADMIN — SODIUM CHLORIDE, PRESERVATIVE FREE 10 ML: 5 INJECTION INTRAVENOUS at 08:22

## 2024-09-13 RX ADMIN — INSULIN LISPRO 3 UNITS: 100 INJECTION, SOLUTION INTRAVENOUS; SUBCUTANEOUS at 17:12

## 2024-09-13 RX ADMIN — ASPIRIN 81 MG CHEWABLE TABLET 81 MG: 81 TABLET CHEWABLE at 08:18

## 2024-09-13 ASSESSMENT — PAIN SCALES - GENERAL
PAINLEVEL_OUTOF10: 5
PAINLEVEL_OUTOF10: 7
PAINLEVEL_OUTOF10: 6
PAINLEVEL_OUTOF10: 4
PAINLEVEL_OUTOF10: 10
PAINLEVEL_OUTOF10: 3
PAINLEVEL_OUTOF10: 7

## 2024-09-13 ASSESSMENT — PAIN DESCRIPTION - DESCRIPTORS
DESCRIPTORS: ACHING;SORE
DESCRIPTORS: ACHING;THROBBING
DESCRIPTORS: CRAMPING;SHARP
DESCRIPTORS: THROBBING;HEAVINESS

## 2024-09-13 ASSESSMENT — PAIN DESCRIPTION - ORIENTATION
ORIENTATION: LEFT
ORIENTATION: RIGHT;LEFT
ORIENTATION: RIGHT;LEFT
ORIENTATION: LEFT

## 2024-09-13 ASSESSMENT — PAIN DESCRIPTION - LOCATION
LOCATION: BACK;NECK;HIP
LOCATION: NECK
LOCATION: NECK;HIP
LOCATION: LEG
LOCATION: JAW;NECK

## 2024-09-13 ASSESSMENT — 9 HOLE PEG TEST
TESTTIME_SECONDS: 35.7
TEST_RESULT: IMPAIRED

## 2024-09-13 NOTE — H&P
[facility]   History and Physical        Patient:   Mary Lira  MR#:    [id number]  Account Number:                   [id number]      Room:    [facility]/817-02   YOB: 1975  Date of Progress Note: 9/13/2024  Time of Note                           6:23 AM  Attending Physician:  Dario Triplett MD        Admitting diagnosis: 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 for high-grade stenosis    Secondary diagnoses: New onset A-fib, anxiety, sleep apnea, CHF, DM, HLD, morbid obesity, and HTN    CHIEF COMPLAINT: Right-sided weakness      HISTORY OF PRESENT ILLNESS:   This 48 y.o. female   with history anxiety, sleep apnea, CHF, DM, HLD, morbid obesity, and HTN. She presented to [facility] ER on 9/3/24 with c/o right-sided weakness, slurred speech, headache, shortness of breath, cough with productive green sputum for about 1 week, nausea and diarrhea. Work-up in ER  Leukocytosis, Hg 10.5, Glucose 142, CR 1.2 And BUN 21. roponin borderline elevated.  Respiratory panel negative.  CT brain did not report any acute intracranial abnormality CTA Head/Neck reported 90% stenosis left ICA, 60% right ICA and Chest xray reported opacity left upper lobe. She received IV Ceftriaxone and IV Azithromycin in ER. She was admitted to the Hospitalist service with consult for neurology and vascular surgery. She was seen by neurologist Dr. Diaz and felt to have an acute ischemic stroke, patient was outside of the window for TNK. MRI done showed MRI brain showed left hemispheric small ischemic infarcts. She was placed on aspirin/Plavix and statin therapy. She was then seen by vascular surgeon Dr. Eliezer Palencia to address her severe left carotid stenosis. Dr. Palencia recommended a left carotid endarterectomy. Cardiology was consulted for surgery clearance. She was seen by Dr. Lazo. Lexiscan done showed ejection fraction 73% normal perfusion study no evidence of ischemia.  Dr. Lazo okayed to proceed with vascular

## 2024-09-13 NOTE — PROGRESS NOTES
09/13/24 1045   Encounter Summary   Encounter Overview/Reason Initial Encounter;Spiritual/Emotional Needs   Service Provided For Patient   Referral/Consult From Palliative Care   Support System Children;Family members   Complexity of Encounter Moderate   Begin Time 0905   End Time  0940   Total Time Calculated 35 min   Spiritual/Emotional needs   Type Spiritual Support   Palliative Care   Type Palliative Care, Initial/Spiritual Assessment   Advance Care Planning   Type Care Preferences Addressed   Assessment/Intervention/Outcome   Assessment Coping;Hopeful   Intervention Active listening;Nurtured Hope;Prayer (assurance of)/[place];Sustaining Presence/Ministry of presence   Outcome Acceptance;Engaged in conversation;Expressed Gratitude;Receptive   Plan and Referrals   Plan/Referrals Continue to visit, (comment);Continue Support (comment)   Does the patient have a [facility] PCP? Yes    to follow up after discharge? No         Palliative Care/Spiritual Care: Met with pt to initiate palliative care. Pt says she has two strokes. She also says she has diabetes, and high blood pressure. Pt says she has a glucose monitor. Pt is in rehab to do therapy for the strokes. Pt has other diagnoses in past medical history.         Advance Directives: Pt wants to do a LW to name her daughter as her decision maker. She says she has a  but they are . This  will assist pt this afternoon with completing a LW. Pt is full code and wants CPR and Ventilator.         Pain/other symptoms: Pt did not complain of pain.        Social/Spiritual: Pt says she attends [employer]. She asked this  to call them and let them know she is in the hospital. This  called them and informed them.        Pt/family discussion r/t goals: Pt says currently she is living in a hotel. She says it is her and her adult daughter, and her grandson. Pt says basically she is homeless. She also says she has

## 2024-09-13 NOTE — DISCHARGE INSTRUCTIONS
Stress and Coping: Mindfulness Can Help (02:29)  Your health professional recommends that you watch this short online health video.  Learn how two people practice mindfulness to help them cope with stress or a health problem.   Purpose: Demonstrates strategies for practicing mindfulness to reduce stress or cope with a health problem.  Goal: The user will learn how to use mindfulness to reduce stress or live with a chronic condition.    Watch: Scan the QR code or visit the link to view video       https://hwi.se/r/[email or web address]  Current as of: June 24, 2023  Content Version: 14.1  © 6245-6240 [employer].   Care instructions adapted under license by [employer]. If you have questions about a medical condition or this instruction, always ask your healthcare professional. [employer] disclaims any warranty or liability for your use of this information.    [facility]   Financial Resources*      Crisis Resources    [facility] Family Support Office  [address] [place], [place] [address]  [phone]  [facility]   [address], [place] [address]  Website: https://www.wkas-ky.org/  [phone]  [facility]   [address], [place] [address]  Website: https://www.[employer]/  [phone]  [facility]  [address], [place] [address]  Website: [email or web address] [email or web address]/  [phone]  [facility]  [address] [place], [place] [address]  [phone]  [facility]  [address] [place], [place] [address]  [phone]  [facility]  [address], [place] [address]  [phone]    [facility] Family Support Office  [address] [address]  [phone] or [phone]  [facility]  [address] [place] [address]  Website:    [address] [place], [place] [address]- at [place] [employer]   Website: [email or web address]://[employer]/acts  [phone]       [facility]               Safety Resources*     Emergency: 911    Non-Emergency Dispatch: [phone]    [facility] Domestic Crisis Center   24 Hour Crisis Line: [phone] [phone]  [facility] Crisis [facility] is committed to improving the lives of those affected by domestic violence: women, men, and children. We are the state-designated and only certified domestic violence program in the [facility] serving [facility], [place], [facility], [place], and [facility]. The campus includes a 36-bed emergency shelter, a central administration/counseling and dining area, and outreach and counseling services buildings.     Domestic Violence Hotline    [phone]    Domestic Violence Victim Services - [place]    Need Help Now?   Domestic Violence Helpline:   9-586-TO END DV or 1-434.734.7288 (Voice)   1-380.515.3243 (TTY)   The hotline is toll free, confidential, multilingual, and open 24-hour    [facility]   [address] [place] [address]   24 Hour Helpline: 1-678.272.1393   [email or web address].org/    [facility] is [place]'s [facility] children's advocacy and sexual assault center  serving all [facility] of the [facility]. We are committed to serving all  survivors and do not discriminate against anyone on any basis.    [facility] Watch Counseling and Advocacy [facility]    [address] [place] [address]    www.childwatchcac.org    [phone]    [place] to provide children and families in [facility] with education, a dvocacy and treatment that prevents child abuse, encourages family stability,  promotes healthy relationships, enhances individual wellbeing and cultivates  safe, nurturing communities.    [facility]  Transportation Resources*      Public Transportation  [facility] [employer]

## 2024-09-13 NOTE — ACP (ADVANCE CARE PLANNING)
Advance Care Planning     Advance Care Planning Inpatient Note  [facility]    Today's Date: 9/13/2024  Unit: [facility] 8 REHAB UNIT    Received request from Other: Palliative care .  Upon review of chart and communication with care team, patient's decision making abilities are not in question.. Patient was/were present in the room during visit.    Goals of ACP Conversation:  Discuss advance care planning documents    Health Care Decision Makers:       Primary Decision Maker: Terrie Escoto - Child - [phone]    Secondary Decision Maker: Jayda Elias - Aunt/Uncle - [phone]  Summary:  Completed New Documents    Advance Care Planning Documents (Patient Wishes):  Living Will/Advance Directive     Assessment:  Pt is a 48 year old female who had two strokes, per the pt. Pt has other diagnoses and says she had the strokes because of her carotid artery. She had surgery on her carotid because it was blocked. Pt is in rehab and her goal is to be able to get her mobility and strength back. Pt also has some needs and hopefully will be able to get some of the met during her stay in rehab.     Interventions:  Assisted in the completion of documents according to patient's wishes at this time    Care Preferences Communicated:     Hospitalization:  If the patient's health worsens and it becomes clear that the chance of recovery is unlikely,     the patient wants hospitalization.    Ventilation:   If the patient, in their present state of health, suddenly became very ill and unable to breathe on their own,     the patient would desire the use of a ventilator (breathing machine).    If their health worsens and it becomes clear that the change of recovery is unlikely,     the patient would desire the use of a ventilator (breathing machine).    Resuscitation:  In the event the patient's heart stopped as a result of an underlying serious health condition, the patient communicates a preference for      resuscitative

## 2024-09-13 NOTE — PLAN OF CARE
Problem: Safety - Adult  Goal: Free from fall injury  9/12/2024 1918 by Jackie Berman RN  Outcome: Progressing  9/12/2024 1918 by Jackie Berman RN  Outcome: Progressing  Flowsheets (Taken 9/12/2024 1904)  Free From Fall Injury: Instruct family/caregiver on patient safety     Problem: Skin/Tissue Integrity  Goal: Absence of new skin breakdown  Description: 1.  Monitor for areas of redness and/or skin breakdown  2.  Assess vascular access sites hourly  3.  Every 4-6 hours minimum:  Change oxygen saturation probe site  4.  Every 4-6 hours:  If on nasal continuous positive airway pressure, respiratory therapy assess nares and determine need for appliance change or resting period.  9/12/2024 1918 by Jackie Berman RN  Outcome: Progressing  9/12/2024 1918 by Jackie Berman RN  Outcome: Progressing     Problem: ABCDS Injury Assessment  Goal: Absence of physical injury  9/12/2024 1918 by Jackie Berman RN  Outcome: Progressing  9/12/2024 1918 by Jackie Berman RN  Outcome: Progressing     Problem: Discharge Planning  Goal: Discharge to home or other facility with appropriate resources  9/12/2024 1918 by Jackie Berman RN  Outcome: Progressing  9/12/2024 1918 by Jackie Berman RN  Outcome: Progressing  Flowsheets (Taken 9/12/2024 1915)  Discharge to home or other facility with appropriate resources:   Identify barriers to discharge with patient and caregiver   Refer to discharge planning if patient needs post-hospital services based on physician order or complex needs related to functional status, cognitive ability or social support system     Problem: Pain  Goal: Verbalizes/displays adequate comfort level or baseline comfort level  9/12/2024 1918 by Jackie Berman RN  Outcome: Progressing  9/12/2024 1918 by Jackie Berman RN  Outcome: Progressing      Called patient to determine if patient would like to continue LAAO workup. Per patient, had HUSSEIN ligation in 2021 with aortic valve surgery completed in [place]. Will forward to Dr. King and Dr. Bejarano and will call patient for next step plans. Patient in agreement with plan and verbalized understanding.

## 2024-09-13 NOTE — PROGRESS NOTES
Facility/Department: [facility] 8 REHAB UNIT  Occupational Therapy Initial Assessment    Name: Mary Lira  : 1975  MRN: [id number]  Date of Service: 2024    Discharge Recommendations:  Home with Home health OT          Past Medical History:  has a past medical history of Anxiety, Apnea, CHF (congestive heart failure) (HCC), Diabetes mellitus (HCC), Hyperlipidemia, Hypertension, and Palliative care patient.  Past Surgical History:  has a past surgical history that includes Appendectomy and Carotid endarterectomy (Left, 2024).    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 hemiparesis      Assessment   Performance deficits / Impairments: Decreased functional mobility ;Decreased ADL status;Decreased ROM;Decreased strength;Decreased balance;Decreased high-level IADLs;Decreased endurance;Decreased coordination  Assessment: The patient would benefit from further therapy to upgrade functional status prior to discharge home.  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 hemiparesis  Prognosis: Good  Decision Making: Low Complexity  History: New onset A-fib, anxiety, sleep apnea, CHF, DM, HLD, morbid obesity, and HTN  Activity Tolerance  Activity Tolerance: Patient Tolerated treatment well            Plan   Occupational Therapy Plan  Current Treatment Recommendations: Strengthening, Balance training, Endurance training, Equipment evaluation, education, & procurement, Patient/Caregiver education & training, Safety education & training, Self-Care / ADL, Home management training, Coordination training     Restrictions  Restrictions/Precautions  Restrictions/Precautions: Fall Risk  Lower Extremity Weight Bearing Restrictions  Right Lower Extremity Weight Bearing: Weight Bearing As Tolerated    Subjective   General  Chart Reviewed: Yes, Orders  Patient assessed for rehabilitation services?: Yes  Additional Pertinent Hx: New onset

## 2024-09-13 NOTE — PROGRESS NOTES
Nutrition Assessment     Type and Reason for Visit: Initial    Malnutrition Assessment:  Malnutrition Status: No malnutrition    Nutrition Assessment:  Following for new admit to inpatient rehab unit. Pt presents adequately nourished AEB stable wt status and PO intake avg >75%. Pt with hx DM, Carb Control diet in place. BG ranging 219-332mg/dL. Insulin adjustments ongoing. Will follow nutrition progression and implement intervention as needed.    Nutrition Related Findings:   KbdM8h=3.4% (9/3/24); Home DM meds: Basaglar 40u daily, Glipizide 5mg BID, Humalog 10u TIDAC Wound Type: Skin Tears    Current Nutrition Therapies:    ADULT DIET; Regular; 4 carb choices (60 gm/meal)    Anthropometric Measures:  Height: 160 cm (5' 3\")  Current Body Wt: 118 kg (260 lb 2.3 oz)   BMI: 46.1    Nutrition Diagnosis:   No nutrition diagnosis at this time     Nutrition Interventions:   Food and/or Nutrient Delivery: Continue Current Diet  Nutrition Education/Counseling: No recommendation at this time  Coordination of Nutrition Care: Continue to monitor while inpatient       Goals:     Goals: PO intake 50% or greater       Nutrition Monitoring and Evaluation:   Behavioral-Environmental Outcomes: None Identified  Food/Nutrient Intake Outcomes: Food and Nutrient Intake  Physical Signs/Symptoms Outcomes: Biochemical Data, Nutrition Focused Physical Findings, Weight, Skin    Discharge Planning:    Too soon to determine     Beatris Espinosa MS, RD, LD, [facility]  Contact: [phone]
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
## 2024-09-13 NOTE — PROGRESS NOTES
Occupational Therapy  Facility/Department: [facility] 8 REHAB UNIT  Rehabilitation Occupational Therapy Daily Treatment Note    Date: 24  Patient Name: Mary Lira       Room: 0817/817-02  MRN: [id number]  Account: [id number]   : 1975  (48 y.o.) Gender: female        Diagnosis: 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 with Right hemiparesis  Additional Pertinent Hx: New onset A-fib, anxiety, sleep apnea, CHF, DM, HLD, morbid obesity, and HTN    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 hemiparesis   Past Medical History:  has a past medical history of Anxiety, Apnea, CHF (congestive heart failure) (HCC), Diabetes mellitus (HCC), Hyperlipidemia, Hypertension, and Palliative care patient.  Past Surgical History:   has a past surgical history that includes Appendectomy and Carotid endarterectomy (Left, 2024).    Restrictions  Restrictions/Precautions: Fall Risk  Right Lower Extremity Weight Bearing: Weight Bearing As Tolerated    Subjective     Restrictions/Precautions: Fall Risk             Objective  Vision  Vision Exceptions: Wears glasses at all times  Hearing  Hearing: Within functional limits  Cognition  Cognition Comment: awake, alert, follows simple commands  Orientation  Overall Orientation Status: Within Functional Limits       Pain Screening   Pain at present 3   Scale Used Numeric Score   Intervention List Patient able to continue with treatment   Comments / Details B LEs   General   Family / Caregiver Present No   Social/Functional History   Lives With Friend(s)   Type of Home House   Home Layout One level   Home Access Stairs to enter without rails   Entrance Stairs - Number of Steps 2   Bathroom Shower/Tub Tub/Shower unit   Bathroom Toilet Standard   Bathroom Equipment None   Home Equipment None   Has the patient had two or more falls in the past year or any fall with injury in the past year? No   Homemaking Assistance Independent    Homemaking Responsibilities Yes   Ambulation Assistance Independent   Transfer Assistance Independent   Active  No   Occupation On disability   Leisure & Hobbies hang out with family, play games on phone, read, play with grandson, color   Transfers   Stand Step Transfers Minimal assistance   Sit to stand Minimal assistance   Stand to sit Minimal assistance   LUE AROM (degrees)   LUE AROM  WFL   RUE AROM (degrees)   RUE AROM  WFL   RUE General AROM 3+/5 throughout   Hand Dominance   Hand Dominance Right   Right Hand Strength -  (lbs)   Handle Setting 2 32.2   Fine Motor Skills   Right 9-Hole Peg Test Impaired   Right 9 Hole Peg Test Time (secs) 35.7   Assessment   Performance deficits / Impairments Decreased functional mobility ;Decreased ADL status;Decreased ROM;Decreased strength;Decreased balance;Decreased high-level IADLs;Decreased endurance;Decreased coordination   Activity Tolerance   Activity Tolerance Patient Tolerated treatment well   Occupational Therapy Plan   Current Treatment Recommendations Strengthening;Balance training;Endurance training;Equipment evaluation, education, & procurement;Patient/Caregiver education & training;Safety education & training;Self-Care / ADL;Home management training;Coordination training       Plan  Occupational Therapy Plan  Current Treatment Recommendations: Strengthening;Balance training;Endurance training;Equipment evaluation, education, & procurement;Patient/Caregiver education & training;Safety education & training;Self-Care / ADL;Home management training;Coordination training    Goals  Short Term Goals  Short Term Goal 1: patient will complete overall toileting with supervision  Short Term Goal 2: patient will complete LB dressing with supervision  Short Term Goal 3: patient will complete overall bathing with supervision  Short Term Goal 4: patient will complete ambulatory home making task with supervision  Short Term Goal 5: patient will complete 1-2 handed

## 2024-09-13 NOTE — PROGRESS NOTES
09/13/24 1300   Restrictions/Precautions   Restrictions/Precautions Fall Risk   Lower Extremity Weight Bearing Restrictions   Right Lower Extremity Weight Bearing Weight Bearing As Tolerated   Transfers   Sit to Stand Contact guard assistance   Stand to Sit Contact guard assistance   Bed to Chair Contact guard assistance   Ambulation   Surface Level tile   Device Rolling Walker   Assistance Contact guard assistance   Quality of Gait step to pattern trendelenburg to R side   Gait Deviations Slow Mae;Increased GURWINDER;Decreased step length;Decreased step height;Decreased arm swing   Distance 50 ft 2 turns   Wheelchair Activities   Propulsion Yes   Propulsion 1   Propulsion Manual   Level Level Tile   Method RUE;LUE   Level of Assistance Supervision   Distance 20 ft, 2 turns   PT Exercises   Exercise Treatment LAQ with 1.5 lbs weight on each leg, seated marches, ankle pumps,   Safety Devices   Type of Devices Left in chair;Call light within reach;Gait belt   PT Individual Minutes   Time In 1300   Time Out 1345   Minutes 45

## 2024-09-13 NOTE — PROGRESS NOTES
4 Eyes Skin Assessment     NAME:  Mary Lira  YOB: 1975  MEDICAL RECORD NUMBER:  [id number]    The patient is being assessed for  Admission    I agree that at least one RN has performed a thorough Head to Toe Skin Assessment on the patient. ALL assessment sites listed below have been assessed.      Areas assessed by both nurses:    Head, Face, Ears, Shoulders, Back, Chest, Arms, Elbows, Hands, Sacrum. Buttock, Coccyx, Ischium, Legs. Feet and Heels, and Under Medical Devices         Does the Patient have a Wound? Yes wound(s) were present on assessment. LDA wound assessment was Initiated and completed by RN       Daryl Prevention initiated by RN: Yes  Wound Care Orders initiated by RN: No    Pressure Injury (Stage 3,4, Unstageable, DTI, NWPT, and Complex wounds) if present, place Wound referral order by RN under : No    New Ostomies, if present place, Ostomy referral order under : No     Nurse 1 eSignature: Electronically signed by Jackie Berman RN on 9/12/24 at 7:49 PM CDT    **SHARE this note so that the co-signing nurse can place an eSignature**    Nurse 2 eSignature: Electronically signed by Renee Stern LPN on 9/12/24 at 7:52 PM CDT

## 2024-09-13 NOTE — PROGRESS NOTES
24 1434   Encounter Summary   Encounter Overview/Reason Advance Care Planning   Service Provided For Patient   Referral/Consult From Palliative Care   Support System Children;Family members   Complexity of Encounter Moderate   Begin Time 1348   End Time  1423   Total Time Calculated 35 min   Spiritual/Emotional needs   Type Spiritual Support  (Assisted pt with completing a LW.)   Palliative Care   Type Palliative Care, Follow-up   Advance Care Planning   Type Completed AD/ACP document(s)   Assessment/Intervention/Outcome   Assessment Coping;Hopeful   Intervention Active listening;Prayer (assurance of)/[place];Other (Comment)  (Assisted pt with completing a LW.)   Outcome Acceptance;Expressed Gratitude;Receptive   Plan and Referrals   Plan/Referrals Continue to visit, (comment);Continue Support (comment)   Does the patient have a [facility] PCP? Yes    to follow up after discharge? No         This  did a follow up visit with the pt to assist her with completing a LW. Pt says she has a  she is  from and wanted her daughter and her aunt to be her decision makers. This  provided the document and discussed the decisions. Pt was identified with her armband and her MRN and . Pt named her daughter Terrie Escoto as primary decision maker and named her aunt Jayda Elias as her secondary decision maker. Pt also made advance care planning decisions. Pt signed the notary log and initialed and signed the document. This  witnessed the pt initial and sign and notarized the document. Original and copies were given to pt and a copy was taken to ED who scanned in the document into pt's EMR. Pt expressed gratitude for assistance with completing her LW.

## 2024-09-14 LAB
GLUCOSE BLD-MCNC: 181 MG/DL (ref 70–99)
GLUCOSE BLD-MCNC: 235 MG/DL (ref 70–99)
GLUCOSE BLD-MCNC: 245 MG/DL (ref 70–99)
GLUCOSE BLD-MCNC: 276 MG/DL (ref 70–99)
PERFORMED ON: ABNORMAL

## 2024-09-14 PROCEDURE — 97535 SELF CARE MNGMENT TRAINING: CPT

## 2024-09-14 PROCEDURE — 1180000000 HC REHAB R&B

## 2024-09-14 PROCEDURE — 6370000000 HC RX 637 (ALT 250 FOR IP): Performed by: STUDENT IN AN ORGANIZED HEALTH CARE EDUCATION/TRAINING PROGRAM

## 2024-09-14 PROCEDURE — 99232 SBSQ HOSP IP/OBS MODERATE 35: CPT | Performed by: PSYCHIATRY & NEUROLOGY

## 2024-09-14 PROCEDURE — 97110 THERAPEUTIC EXERCISES: CPT

## 2024-09-14 PROCEDURE — 6370000000 HC RX 637 (ALT 250 FOR IP): Performed by: PSYCHIATRY & NEUROLOGY

## 2024-09-14 PROCEDURE — 82962 GLUCOSE BLOOD TEST: CPT

## 2024-09-14 PROCEDURE — 94760 N-INVAS EAR/PLS OXIMETRY 1: CPT

## 2024-09-14 PROCEDURE — 97116 GAIT TRAINING THERAPY: CPT

## 2024-09-14 PROCEDURE — 97530 THERAPEUTIC ACTIVITIES: CPT

## 2024-09-14 RX ORDER — LIDOCAINE 4 G/G
1 PATCH TOPICAL EVERY 24 HOURS
Status: DISCONTINUED | OUTPATIENT
Start: 2024-09-15 | End: 2024-09-26 | Stop reason: HOSPADM

## 2024-09-14 RX ORDER — LANOLIN ALCOHOL/MO/W.PET/CERES
400 CREAM (GRAM) TOPICAL NIGHTLY
Status: DISCONTINUED | OUTPATIENT
Start: 2024-09-14 | End: 2024-09-26 | Stop reason: HOSPADM

## 2024-09-14 RX ADMIN — TIZANIDINE 4 MG: 4 TABLET ORAL at 14:49

## 2024-09-14 RX ADMIN — FUROSEMIDE 20 MG: 20 TABLET ORAL at 08:03

## 2024-09-14 RX ADMIN — INSULIN LISPRO 3 UNITS: 100 INJECTION, SOLUTION INTRAVENOUS; SUBCUTANEOUS at 18:11

## 2024-09-14 RX ADMIN — BUTALBITAL, ACETAMINOPHEN AND CAFFEINE 1 TABLET: 325; 50; 40 TABLET ORAL at 18:11

## 2024-09-14 RX ADMIN — INSULIN LISPRO 3 UNITS: 100 INJECTION, SOLUTION INTRAVENOUS; SUBCUTANEOUS at 11:59

## 2024-09-14 RX ADMIN — INSULIN GLARGINE 30 UNITS: 100 INJECTION, SOLUTION SUBCUTANEOUS at 08:04

## 2024-09-14 RX ADMIN — INSULIN LISPRO 1 UNITS: 100 INJECTION, SOLUTION INTRAVENOUS; SUBCUTANEOUS at 18:11

## 2024-09-14 RX ADMIN — BUSPIRONE HYDROCHLORIDE 5 MG: 5 TABLET ORAL at 08:03

## 2024-09-14 RX ADMIN — HYDROCODONE BITARTRATE AND ACETAMINOPHEN 1 TABLET: 7.5; 325 TABLET ORAL at 00:50

## 2024-09-14 RX ADMIN — METOPROLOL SUCCINATE 50 MG: 50 TABLET, EXTENDED RELEASE ORAL at 08:03

## 2024-09-14 RX ADMIN — GABAPENTIN 300 MG: 300 CAPSULE ORAL at 21:26

## 2024-09-14 RX ADMIN — Medication 400 MG: at 21:27

## 2024-09-14 RX ADMIN — APIXABAN 5 MG: 5 TABLET, FILM COATED ORAL at 21:27

## 2024-09-14 RX ADMIN — SENNOSIDES AND DOCUSATE SODIUM 2 TABLET: 50; 8.6 TABLET ORAL at 08:04

## 2024-09-14 RX ADMIN — HYDROCODONE BITARTRATE AND ACETAMINOPHEN 1 TABLET: 7.5; 325 TABLET ORAL at 11:25

## 2024-09-14 RX ADMIN — GABAPENTIN 300 MG: 300 CAPSULE ORAL at 08:03

## 2024-09-14 RX ADMIN — DILTIAZEM HYDROCHLORIDE 300 MG: 180 CAPSULE, COATED, EXTENDED RELEASE ORAL at 08:03

## 2024-09-14 RX ADMIN — INSULIN LISPRO 1 UNITS: 100 INJECTION, SOLUTION INTRAVENOUS; SUBCUTANEOUS at 11:59

## 2024-09-14 RX ADMIN — GABAPENTIN 300 MG: 300 CAPSULE ORAL at 14:49

## 2024-09-14 RX ADMIN — TRAZODONE HYDROCHLORIDE 100 MG: 100 TABLET ORAL at 21:27

## 2024-09-14 RX ADMIN — ASPIRIN 81 MG CHEWABLE TABLET 81 MG: 81 TABLET CHEWABLE at 08:03

## 2024-09-14 RX ADMIN — ATORVASTATIN CALCIUM 40 MG: 40 TABLET, FILM COATED ORAL at 21:27

## 2024-09-14 RX ADMIN — INSULIN LISPRO 3 UNITS: 100 INJECTION, SOLUTION INTRAVENOUS; SUBCUTANEOUS at 08:04

## 2024-09-14 RX ADMIN — APIXABAN 5 MG: 5 TABLET, FILM COATED ORAL at 08:03

## 2024-09-14 RX ADMIN — HYDROCODONE BITARTRATE AND ACETAMINOPHEN 1 TABLET: 7.5; 325 TABLET ORAL at 21:29

## 2024-09-14 RX ADMIN — TIZANIDINE 4 MG: 4 TABLET ORAL at 08:03

## 2024-09-14 RX ADMIN — TIZANIDINE 4 MG: 4 TABLET ORAL at 21:27

## 2024-09-14 RX ADMIN — SERTRALINE HYDROCHLORIDE 100 MG: 100 TABLET ORAL at 08:03

## 2024-09-14 ASSESSMENT — PAIN DESCRIPTION - DESCRIPTORS
DESCRIPTORS: DULL
DESCRIPTORS: ACHING
DESCRIPTORS: CRAMPING;SHARP
DESCRIPTORS: ACHING
DESCRIPTORS: SPASM
DESCRIPTORS: ACHING
DESCRIPTORS: ACHING
DESCRIPTORS: THROBBING
DESCRIPTORS: SPASM;TIGHTNESS

## 2024-09-14 ASSESSMENT — PAIN SCALES - GENERAL
PAINLEVEL_OUTOF10: 5
PAINLEVEL_OUTOF10: 7
PAINLEVEL_OUTOF10: 5
PAINLEVEL_OUTOF10: 6
PAINLEVEL_OUTOF10: 5
PAINLEVEL_OUTOF10: 1
PAINLEVEL_OUTOF10: 6
PAINLEVEL_OUTOF10: 3
PAINLEVEL_OUTOF10: 3
PAINLEVEL_OUTOF10: 6
PAINLEVEL_OUTOF10: 3
PAINLEVEL_OUTOF10: 6
PAINLEVEL_OUTOF10: 2

## 2024-09-14 ASSESSMENT — PAIN DESCRIPTION - ORIENTATION
ORIENTATION: LEFT
ORIENTATION: LEFT
ORIENTATION: RIGHT;LEFT
ORIENTATION: RIGHT
ORIENTATION: RIGHT;LEFT
ORIENTATION: LEFT

## 2024-09-14 ASSESSMENT — PAIN DESCRIPTION - LOCATION
LOCATION: JAW
LOCATION: LEG
LOCATION: HEAD
LOCATION: LEG
LOCATION: LEG;NECK
LOCATION: LEG
LOCATION: JAW
LOCATION: NECK

## 2024-09-14 ASSESSMENT — PAIN - FUNCTIONAL ASSESSMENT
PAIN_FUNCTIONAL_ASSESSMENT: ACTIVITIES ARE NOT PREVENTED

## 2024-09-14 NOTE — PROGRESS NOTES
Physical Therapy  Name: Mary Lira  MRN:  [id number]  Date of service:  9/14/2024 09/14/24 1020   Restrictions/Precautions   Restrictions/Precautions Fall Risk   Required Braces or Orthoses? No   Lower Extremity Weight Bearing Restrictions   Right Lower Extremity Weight Bearing Weight Bearing As Tolerated   General   Chart Reviewed Yes   Additional Pertinent Hx pneumonia, lipoprotein metabolism disorder unspecified, DM 2, anxiety disorder, obesity, obstructive sleep apnea, heart failure   Family / Caregiver Present No   Referring Practitioner Sandeep Diaz MD, Howie Matute MD   Subjective   Subjective Agreeable to therapy   General Comment   Comments Up in recliner upon PTA entering   Pain Assessment   Pain Assessment 0-10   Pain Level 5   Patient's Stated Pain Goal 0 - No pain   Pain Location Leg;Neck   Pain Orientation Right   Pain Descriptors Cramping;Sharp   Functional Pain Assessment Activities are not prevented   Non-Pharmaceutical Pain Intervention(s) Ambulation/Increased Activity;Repositioned;Rest   Response to Pain Intervention Patient satisfied   Side Effects No reported side effects   Oxygen Therapy   O2 Device None (Room air)   Transfers   Sit to Stand Contact guard assistance   Stand to Sit Contact guard assistance   Stand Pivot Transfers Contact guard assistance   Exercises   Hip Flexion 15   Hip Abduction 15   Knee Long Arc Quad 15   Ankle Pumps 20   Comments 2 sets 2# BLE   Patient Goals    Patient Goals  go home, get stronger   Short Term Goals   Short Term Goal 1 supervision bed mobiliyt   Short Term Goal 2 supervision transfers   Short Term Goal 3 supervision amb with AD 50ft 2 turns   Short Term Goal 4 supervision car transfer   Short Term Goal 5 min A 4 steps with railing   Conditions Requiring Skilled Therapeutic Intervention   Body Structures, Functions, Activity Limitations Requiring Skilled Therapeutic Intervention Decreased functional mobility ;Decreased strength;Decreased

## 2024-09-14 NOTE — PROGRESS NOTES
Occupational Therapy  Facility/Department: [facility] 8 REHAB UNIT  Rehabilitation Occupational Therapy Daily Treatment Note    Date: 24  Patient Name: Mary Lira       Room: 0817/817-02  MRN: [id number]  Account: [id number]   : 1975  (48 y.o.) Gender: female        Diagnosis: (P) 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 with Right hemiparesis  Additional Pertinent Hx: (P) New onset A-fib, anxiety, sleep apnea, CHF, DM, HLD, morbid obesity, and HTN        Past Medical History:  has a past medical history of Anxiety, Apnea, CHF (congestive heart failure) (HCC), Diabetes mellitus (HCC), Hyperlipidemia, Hypertension, and Palliative care patient.  Past Surgical History:   has a past surgical history that includes Appendectomy and Carotid endarterectomy (Left, 2024).     24 0815   Restrictions/Precautions   Restrictions/Precautions Fall Risk   General   Additional Pertinent Hx New onset A-fib, anxiety, sleep apnea, CHF, DM, HLD, morbid obesity, and HTN   Diagnosis 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 with Right hemiparesis   Balance   Standing Balance Stand by assistance   Standing Balance   Activity LB Clothing mgmnt   Functional Mobility   Functional - Mobility Device Rolling Walker   Activity To/from bathroom;Other   Assist Level Contact guard assistance  (/SBA)   Functional Mobility Comments short distances in pt room and OT   Transfers   Sit to stand Contact guard assistance   Stand to sit Contact guard assistance   Toilet Transfers   Toilet - Technique Ambulating   Equipment Used Grab bars   Toilet Transfer Contact guard assistance  (SBA)   OT Exercises   Exercise Treatment 2# Tbar   Dynamic Sitting Balance Exercises BUE acts EOM   Activity Tolerance   Activity Tolerance Patient Tolerated treatment well   Safety Devices   Type of Devices Call light within reach   Time Code Minutes    Timed Code Treatment Minutes 45 Minutes   OT Individual Minutes   Time In 0815

## 2024-09-14 NOTE — PROGRESS NOTES
Patient:   Mary Lira  MR#:    [id number]   Room:    0817/817-02   YOB: 1975  Date of Progress Note: 9/14/2024  Time of Note                           8:52 AM  Consulting Physician:   Dario Triplett M.D.  Attending Physician:  Dario Triplett MD       CHIEF COMPLAINT: Right-sided weakness     Subjective  This 48 y.o. female   with history anxiety, sleep apnea, CHF, DM, HLD, morbid obesity, and HTN. She presented to [facility] ER on 9/3/24 with c/o right-sided weakness, slurred speech, headache, shortness of breath, cough with productive green sputum for about 1 week, nausea and diarrhea. Work-up in ER  Leukocytosis, Hg 10.5, Glucose 142, CR 1.2 And BUN 21. roponin borderline elevated.  Respiratory panel negative.  CT brain did not report any acute intracranial abnormality CTA Head/Neck reported 90% stenosis left ICA, 60% right ICA and Chest xray reported opacity left upper lobe. She received IV Ceftriaxone and IV Azithromycin in ER. She was admitted to the Hospitalist service with consult for neurology and vascular surgery. She was seen by neurologist Dr. Diaz and felt to have an acute ischemic stroke, patient was outside of the window for TNK. MRI done showed MRI brain showed left hemispheric small ischemic infarcts. She was placed on aspirin/Plavix and statin therapy. She was then seen by vascular surgeon Dr. Eliezer Palencia to address her severe left carotid stenosis. Dr. Palencia recommended a left carotid endarterectomy. Cardiology was consulted for surgery clearance. She was seen by Dr. Lazo. Lexiscan done showed ejection fraction 73% normal perfusion study no evidence of ischemia.  Dr. Lazo okayed to proceed with vascular surgery. Patient was in agreement and went for surgery on 9/6/24. She tolerated the procedure well. Patient was evaluated by SPT patient obtained 24 out of 30 possible points, indicative of mild cognitive-linguistic impairment (patient was 2/3 recall, 0/3 multi-step  year or any fall with injury in the past year? No   Homemaking Assistance Independent   Homemaking Responsibilities Yes   Ambulation Assistance Independent   Transfer Assistance Independent   Active  No   Occupation On disability   Leisure & Hobbies hang out with family, play games on phone, read, play with grandson, color   Transfers   Stand Step Transfers Minimal assistance   Sit to stand Minimal assistance   Stand to sit Minimal assistance   LUE AROM (degrees)   LUE AROM  WFL   RUE AROM (degrees)   RUE AROM  WFL   RUE General AROM 3+/5 throughout   Hand Dominance   Hand Dominance Right   Right Hand Strength -  (lbs)   Handle Setting 2 32.2   Fine Motor Skills   Right 9-Hole Peg Test Impaired   Right 9 Hole Peg Test Time (secs) 35.7   Assessment   Performance deficits / Impairments Decreased functional mobility ;Decreased ADL status;Decreased ROM;Decreased strength;Decreased balance;Decreased high-level IADLs;Decreased endurance;Decreased coordination   Activity Tolerance   Activity Tolerance Patient Tolerated treatment well   Occupational Therapy Plan   Current Treatment Recommendations Strengthening;Balance training;Endurance training;Equipment evaluation, education, & procurement;Patient/Caregiver education & training;Safety education & training;Self-Care / ADL;Home management training;Coordination training           RECORD REVIEW: Previous medical records, medications were reviewed at today's visit    IMPRESSION:     1.  Left hemisphere stroke status post left carotid endarterectomy-aspirin/statin-PT/OT/SLP  2.  New onset A-fib/DVT prophylaxis-Eliquis, diltiazem, metoprolol  3.  Anxiety-BuSpar, trazodone at night.  Zoloft  4.  Hyperlipidemia-Lipitor  5.  CHF-Lasix  6.  Diabetes-insulin and monitoring  7.  GI-bowel regimen  Continue Lidoderm and tizanidine for sciatica  Mag oxide added to help with leg cramps  ELOS: Staff on Wednesday

## 2024-09-14 NOTE — PROGRESS NOTES
Physical Therapy    Name: Mary Lira  MRN:  [id number]  Date of service:  9/14/2024 09/14/24 1318   Restrictions/Precautions   Restrictions/Precautions Fall Risk   Required Braces or Orthoses? No   Lower Extremity Weight Bearing Restrictions   Right Lower Extremity Weight Bearing Weight Bearing As Tolerated   General   Additional Pertinent Hx pneumonia, lipoprotein metabolism disorder unspecified, DM 2, anxiety disorder, obesity, obstructive sleep apnea, heart failure   Response To Previous Treatment Patient with no complaints from previous session.   Family / Caregiver Present No   Referring Practitioner Sandeep Diaz MD, Howie Matute MD   Subjective   Subjective Ready for therapy but needed to use rest room first   General Comment   Comments Up in recliner   Pain Assessment   Pain Assessment 0-10   Pain Level 2   Pain Location Neck   Pain Orientation Left   Pain Descriptors Dull   Functional Pain Assessment Activities are not prevented   Response to Pain Intervention Patient satisfied   Side Effects No reported side effects   Oxygen Therapy   O2 Device None (Room air)   Transfers   Sit to Stand Contact guard assistance;Stand by assistance   Stand to Sit Contact guard assistance;Stand by assistance   Car Transfer Contact guard assistance;Stand by assistance  (cues for technique)   Ambulation   Surface Level tile   Device Rolling Walker   Assistance Contact guard assistance;Stand by assistance   Quality of Gait slow, step to pattern.   Gait Deviations Slow Mae;Decreased step height;Decreased step length;Deviated path   Distance 20'+ 60' 2 turns   Stairs/Curb   Stairs? Yes   Stairs   # Steps  4   Stairs Height 4\"   Rails Bilateral   Assistance Contact guard assistance;Stand by assistance   Comment pt very nervous  but did well with extra time and cues for step to step technique   Short Term Goals   Short Term Goal 1 supervision bed mobiliyt   Short Term Goal 2 supervision transfers   Short Term

## 2024-09-14 NOTE — PROGRESS NOTES
Occupational Therapy Daily Treatment Note  Name: Mary Lira  MRN:  [id number]  Date of service:  9/14/2024 09/14/24 1510   Restrictions/Precautions   Restrictions/Precautions Fall Risk   Required Braces or Orthoses? No   Lower Extremity Weight Bearing Restrictions   Right Lower Extremity Weight Bearing Weight Bearing As Tolerated   General   Patient assessed for rehabilitation services? Yes   Additional Pertinent Hx New onset A-fib, anxiety, sleep apnea, CHF, DM, HLD, morbid obesity, and HTN   Family / Caregiver Present No   Diagnosis 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 with Right hemiparesis   Transfers   Stand Step Transfers Contact guard assistance   Sit to stand Contact guard assistance   Stand to sit Contact guard assistance   OT Exercises   Exercise Treatment Rickshaw: 10# on L UE and 5# on R UE- performed 3 sets of 10 reps. R hand  strengthening completed using 3# digi-flex performing 1 set of 20 reps. R hand isolated finger flexion completed using 3# digi-flex performing 1 set of 10 reps each.   Resistive Exercises Moderate resistance flex bar used in all planes performing 1 set of 10 reps each. Pt had min difficulty.   Fine Motor Skills   Fine Motor Comment R hand FM task completed using 1/2 inch pegs with pegboard. Pt had min dropping   Activity Tolerance   Activity Tolerance Patient Tolerated treatment well   Assessment   Performance deficits / Impairments Decreased functional mobility ;Decreased ADL status;Decreased ROM;Decreased strength;Decreased balance;Decreased high-level IADLs;Decreased endurance;Decreased coordination   Assessment Tx completed. Pt focused on R UE strengthening along with R hand FMC. Pt completed functional t/fers using RW with CGA. Pt had no pain during session today.   Treatment Diagnosis 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 with Right hemiparesis   Prognosis Good   Decision Making Low Complexity   History New onset A-fib,

## 2024-09-15 LAB
GLUCOSE BLD-MCNC: 146 MG/DL (ref 70–99)
GLUCOSE BLD-MCNC: 174 MG/DL (ref 70–99)
GLUCOSE BLD-MCNC: 230 MG/DL (ref 70–99)
GLUCOSE BLD-MCNC: 231 MG/DL (ref 70–99)
PERFORMED ON: ABNORMAL

## 2024-09-15 PROCEDURE — 6370000000 HC RX 637 (ALT 250 FOR IP): Performed by: PSYCHIATRY & NEUROLOGY

## 2024-09-15 PROCEDURE — 1180000000 HC REHAB R&B

## 2024-09-15 PROCEDURE — 94760 N-INVAS EAR/PLS OXIMETRY 1: CPT

## 2024-09-15 PROCEDURE — 6370000000 HC RX 637 (ALT 250 FOR IP): Performed by: STUDENT IN AN ORGANIZED HEALTH CARE EDUCATION/TRAINING PROGRAM

## 2024-09-15 PROCEDURE — 82962 GLUCOSE BLOOD TEST: CPT

## 2024-09-15 RX ADMIN — HYDROCODONE BITARTRATE AND ACETAMINOPHEN 1 TABLET: 7.5; 325 TABLET ORAL at 21:21

## 2024-09-15 RX ADMIN — DILTIAZEM HYDROCHLORIDE 300 MG: 180 CAPSULE, COATED, EXTENDED RELEASE ORAL at 07:56

## 2024-09-15 RX ADMIN — TIZANIDINE 4 MG: 4 TABLET ORAL at 21:17

## 2024-09-15 RX ADMIN — FUROSEMIDE 20 MG: 20 TABLET ORAL at 07:55

## 2024-09-15 RX ADMIN — APIXABAN 5 MG: 5 TABLET, FILM COATED ORAL at 21:18

## 2024-09-15 RX ADMIN — HYDROCODONE BITARTRATE AND ACETAMINOPHEN 1 TABLET: 7.5; 325 TABLET ORAL at 04:52

## 2024-09-15 RX ADMIN — INSULIN LISPRO 1 UNITS: 100 INJECTION, SOLUTION INTRAVENOUS; SUBCUTANEOUS at 12:18

## 2024-09-15 RX ADMIN — SERTRALINE HYDROCHLORIDE 100 MG: 100 TABLET ORAL at 07:56

## 2024-09-15 RX ADMIN — INSULIN LISPRO 3 UNITS: 100 INJECTION, SOLUTION INTRAVENOUS; SUBCUTANEOUS at 07:57

## 2024-09-15 RX ADMIN — ATORVASTATIN CALCIUM 40 MG: 40 TABLET, FILM COATED ORAL at 21:17

## 2024-09-15 RX ADMIN — INSULIN GLARGINE 30 UNITS: 100 INJECTION, SOLUTION SUBCUTANEOUS at 07:57

## 2024-09-15 RX ADMIN — HYDROCODONE BITARTRATE AND ACETAMINOPHEN 1 TABLET: 7.5; 325 TABLET ORAL at 17:24

## 2024-09-15 RX ADMIN — INSULIN LISPRO 3 UNITS: 100 INJECTION, SOLUTION INTRAVENOUS; SUBCUTANEOUS at 12:19

## 2024-09-15 RX ADMIN — GABAPENTIN 300 MG: 300 CAPSULE ORAL at 07:55

## 2024-09-15 RX ADMIN — ASPIRIN 81 MG CHEWABLE TABLET 81 MG: 81 TABLET CHEWABLE at 07:55

## 2024-09-15 RX ADMIN — BUSPIRONE HYDROCHLORIDE 5 MG: 5 TABLET ORAL at 07:56

## 2024-09-15 RX ADMIN — Medication 400 MG: at 21:17

## 2024-09-15 RX ADMIN — SENNOSIDES AND DOCUSATE SODIUM 2 TABLET: 50; 8.6 TABLET ORAL at 07:56

## 2024-09-15 RX ADMIN — APIXABAN 5 MG: 5 TABLET, FILM COATED ORAL at 07:55

## 2024-09-15 RX ADMIN — TIZANIDINE 4 MG: 4 TABLET ORAL at 15:11

## 2024-09-15 RX ADMIN — HYDROCODONE BITARTRATE AND ACETAMINOPHEN 1 TABLET: 7.5; 325 TABLET ORAL at 10:56

## 2024-09-15 RX ADMIN — INSULIN LISPRO 3 UNITS: 100 INJECTION, SOLUTION INTRAVENOUS; SUBCUTANEOUS at 17:21

## 2024-09-15 RX ADMIN — BUTALBITAL, ACETAMINOPHEN AND CAFFEINE 1 TABLET: 325; 50; 40 TABLET ORAL at 15:13

## 2024-09-15 RX ADMIN — METOPROLOL SUCCINATE 50 MG: 50 TABLET, EXTENDED RELEASE ORAL at 07:55

## 2024-09-15 RX ADMIN — GABAPENTIN 300 MG: 300 CAPSULE ORAL at 15:11

## 2024-09-15 RX ADMIN — TIZANIDINE 4 MG: 4 TABLET ORAL at 07:56

## 2024-09-15 RX ADMIN — GABAPENTIN 300 MG: 300 CAPSULE ORAL at 21:17

## 2024-09-15 RX ADMIN — TRAZODONE HYDROCHLORIDE 100 MG: 100 TABLET ORAL at 21:18

## 2024-09-15 ASSESSMENT — PAIN DESCRIPTION - ORIENTATION
ORIENTATION: MID
ORIENTATION: LEFT

## 2024-09-15 ASSESSMENT — PAIN DESCRIPTION - DESCRIPTORS
DESCRIPTORS: ACHING

## 2024-09-15 ASSESSMENT — PAIN SCALES - GENERAL
PAINLEVEL_OUTOF10: 7
PAINLEVEL_OUTOF10: 2
PAINLEVEL_OUTOF10: 4
PAINLEVEL_OUTOF10: 2
PAINLEVEL_OUTOF10: 4
PAINLEVEL_OUTOF10: 2
PAINLEVEL_OUTOF10: 5
PAINLEVEL_OUTOF10: 4
PAINLEVEL_OUTOF10: 5

## 2024-09-15 ASSESSMENT — PAIN DESCRIPTION - LOCATION
LOCATION: NECK;JAW
LOCATION: JAW;NECK
LOCATION: HEAD
LOCATION: JAW

## 2024-09-15 ASSESSMENT — PAIN - FUNCTIONAL ASSESSMENT
PAIN_FUNCTIONAL_ASSESSMENT: ACTIVITIES ARE NOT PREVENTED

## 2024-09-15 NOTE — PLAN OF CARE
Problem: Safety - Adult  Goal: Free from fall injury  Outcome: Progressing  Flowsheets (Taken 9/15/2024 1332)  Free From Fall Injury: Instruct family/caregiver on patient safety     Problem: Skin/Tissue Integrity  Goal: Absence of new skin breakdown  Description: 1.  Monitor for areas of redness and/or skin breakdown  2.  Assess vascular access sites hourly  3.  Every 4-6 hours minimum:  Change oxygen saturation probe site  4.  Every 4-6 hours:  If on nasal continuous positive airway pressure, respiratory therapy assess nares and determine need for appliance change or resting period.  Outcome: Progressing     Problem: ABCDS Injury Assessment  Goal: Absence of physical injury  Outcome: Progressing  Flowsheets (Taken 9/15/2024 1339)  Absence of Physical Injury: Implement safety measures based on patient assessment

## 2024-09-16 LAB
ANION GAP SERPL CALCULATED.3IONS-SCNC: 11 MMOL/L (ref 7–19)
BASOPHILS # BLD: 0.1 K/UL (ref 0–0.2)
BASOPHILS NFR BLD: 0.7 % (ref 0–1)
BUN SERPL-MCNC: 26 MG/DL (ref 6–20)
CALCIUM SERPL-MCNC: 8.7 MG/DL (ref 8.6–10)
CHLORIDE SERPL-SCNC: 99 MMOL/L (ref 98–111)
CO2 SERPL-SCNC: 27 MMOL/L (ref 22–29)
CREAT SERPL-MCNC: 0.6 MG/DL (ref 0.5–0.9)
EOSINOPHIL # BLD: 0.9 K/UL (ref 0–0.6)
EOSINOPHIL NFR BLD: 8 % (ref 0–5)
ERYTHROCYTE [DISTWIDTH] IN BLOOD BY AUTOMATED COUNT: 14.4 % (ref 11.5–14.5)
GLUCOSE BLD-MCNC: 159 MG/DL (ref 70–99)
GLUCOSE BLD-MCNC: 183 MG/DL (ref 70–99)
GLUCOSE BLD-MCNC: 200 MG/DL (ref 70–99)
GLUCOSE BLD-MCNC: 207 MG/DL (ref 70–99)
GLUCOSE SERPL-MCNC: 191 MG/DL (ref 70–99)
HCT VFR BLD AUTO: 33 % (ref 37–47)
HGB BLD-MCNC: 9.8 G/DL (ref 12–16)
IMM GRANULOCYTES # BLD: 0 K/UL
LYMPHOCYTES # BLD: 2.9 K/UL (ref 1.1–4.5)
LYMPHOCYTES NFR BLD: 24.4 % (ref 20–40)
MCH RBC QN AUTO: 23.8 PG (ref 27–31)
MCHC RBC AUTO-ENTMCNC: 29.7 G/DL (ref 33–37)
MCV RBC AUTO: 80.1 FL (ref 81–99)
MONOCYTES # BLD: 1 K/UL (ref 0–0.9)
MONOCYTES NFR BLD: 8.2 % (ref 0–10)
NEUTROPHILS # BLD: 6.8 K/UL (ref 1.5–7.5)
NEUTS SEG NFR BLD: 58.4 % (ref 50–65)
PERFORMED ON: ABNORMAL
PLATELET # BLD AUTO: 439 K/UL (ref 130–400)
PMV BLD AUTO: 10.3 FL (ref 9.4–12.3)
POTASSIUM SERPL-SCNC: 4.5 MMOL/L (ref 3.5–5)
RBC # BLD AUTO: 4.12 M/UL (ref 4.2–5.4)
SODIUM SERPL-SCNC: 137 MMOL/L (ref 136–145)
WBC # BLD AUTO: 11.7 K/UL (ref 4.8–10.8)

## 2024-09-16 PROCEDURE — 85025 COMPLETE CBC W/AUTO DIFF WBC: CPT

## 2024-09-16 PROCEDURE — 97116 GAIT TRAINING THERAPY: CPT

## 2024-09-16 PROCEDURE — 94760 N-INVAS EAR/PLS OXIMETRY 1: CPT

## 2024-09-16 PROCEDURE — 80048 BASIC METABOLIC PNL TOTAL CA: CPT

## 2024-09-16 PROCEDURE — 6370000000 HC RX 637 (ALT 250 FOR IP): Performed by: PSYCHIATRY & NEUROLOGY

## 2024-09-16 PROCEDURE — 97110 THERAPEUTIC EXERCISES: CPT

## 2024-09-16 PROCEDURE — 97535 SELF CARE MNGMENT TRAINING: CPT

## 2024-09-16 PROCEDURE — 99232 SBSQ HOSP IP/OBS MODERATE 35: CPT | Performed by: PSYCHIATRY & NEUROLOGY

## 2024-09-16 PROCEDURE — 36415 COLL VENOUS BLD VENIPUNCTURE: CPT

## 2024-09-16 PROCEDURE — 6370000000 HC RX 637 (ALT 250 FOR IP): Performed by: STUDENT IN AN ORGANIZED HEALTH CARE EDUCATION/TRAINING PROGRAM

## 2024-09-16 PROCEDURE — 1180000000 HC REHAB R&B

## 2024-09-16 PROCEDURE — 82962 GLUCOSE BLOOD TEST: CPT

## 2024-09-16 PROCEDURE — 97530 THERAPEUTIC ACTIVITIES: CPT

## 2024-09-16 RX ORDER — GABAPENTIN 400 MG/1
400 CAPSULE ORAL 3 TIMES DAILY
Status: DISCONTINUED | OUTPATIENT
Start: 2024-09-16 | End: 2024-09-26 | Stop reason: HOSPADM

## 2024-09-16 RX ADMIN — SENNOSIDES AND DOCUSATE SODIUM 2 TABLET: 50; 8.6 TABLET ORAL at 08:08

## 2024-09-16 RX ADMIN — APIXABAN 5 MG: 5 TABLET, FILM COATED ORAL at 08:08

## 2024-09-16 RX ADMIN — HYDROCODONE BITARTRATE AND ACETAMINOPHEN 1 TABLET: 7.5; 325 TABLET ORAL at 21:23

## 2024-09-16 RX ADMIN — METOPROLOL SUCCINATE 50 MG: 50 TABLET, EXTENDED RELEASE ORAL at 08:08

## 2024-09-16 RX ADMIN — ATORVASTATIN CALCIUM 40 MG: 40 TABLET, FILM COATED ORAL at 21:20

## 2024-09-16 RX ADMIN — INSULIN LISPRO 3 UNITS: 100 INJECTION, SOLUTION INTRAVENOUS; SUBCUTANEOUS at 12:03

## 2024-09-16 RX ADMIN — TIZANIDINE 4 MG: 4 TABLET ORAL at 08:09

## 2024-09-16 RX ADMIN — INSULIN LISPRO 3 UNITS: 100 INJECTION, SOLUTION INTRAVENOUS; SUBCUTANEOUS at 17:12

## 2024-09-16 RX ADMIN — BUSPIRONE HYDROCHLORIDE 5 MG: 5 TABLET ORAL at 08:09

## 2024-09-16 RX ADMIN — GABAPENTIN 400 MG: 400 CAPSULE ORAL at 14:41

## 2024-09-16 RX ADMIN — ASPIRIN 81 MG CHEWABLE TABLET 81 MG: 81 TABLET CHEWABLE at 08:09

## 2024-09-16 RX ADMIN — DILTIAZEM HYDROCHLORIDE 300 MG: 180 CAPSULE, COATED, EXTENDED RELEASE ORAL at 08:09

## 2024-09-16 RX ADMIN — TIZANIDINE 4 MG: 4 TABLET ORAL at 14:41

## 2024-09-16 RX ADMIN — Medication 400 MG: at 21:19

## 2024-09-16 RX ADMIN — APIXABAN 5 MG: 5 TABLET, FILM COATED ORAL at 21:19

## 2024-09-16 RX ADMIN — INSULIN LISPRO 1 UNITS: 100 INJECTION, SOLUTION INTRAVENOUS; SUBCUTANEOUS at 12:03

## 2024-09-16 RX ADMIN — GABAPENTIN 400 MG: 400 CAPSULE ORAL at 08:21

## 2024-09-16 RX ADMIN — SERTRALINE HYDROCHLORIDE 100 MG: 100 TABLET ORAL at 08:09

## 2024-09-16 RX ADMIN — INSULIN LISPRO 3 UNITS: 100 INJECTION, SOLUTION INTRAVENOUS; SUBCUTANEOUS at 08:21

## 2024-09-16 RX ADMIN — HYDROCODONE BITARTRATE AND ACETAMINOPHEN 1 TABLET: 7.5; 325 TABLET ORAL at 04:50

## 2024-09-16 RX ADMIN — INSULIN GLARGINE 30 UNITS: 100 INJECTION, SOLUTION SUBCUTANEOUS at 08:09

## 2024-09-16 RX ADMIN — FUROSEMIDE 20 MG: 20 TABLET ORAL at 08:08

## 2024-09-16 RX ADMIN — HYDROCODONE BITARTRATE AND ACETAMINOPHEN 1 TABLET: 7.5; 325 TABLET ORAL at 17:13

## 2024-09-16 RX ADMIN — GABAPENTIN 400 MG: 400 CAPSULE ORAL at 21:20

## 2024-09-16 RX ADMIN — HYDROCODONE BITARTRATE AND ACETAMINOPHEN 1 TABLET: 7.5; 325 TABLET ORAL at 12:02

## 2024-09-16 RX ADMIN — TIZANIDINE 4 MG: 4 TABLET ORAL at 21:19

## 2024-09-16 RX ADMIN — TRAZODONE HYDROCHLORIDE 100 MG: 100 TABLET ORAL at 21:19

## 2024-09-16 ASSESSMENT — PAIN DESCRIPTION - LOCATION
LOCATION: HIP
LOCATION: GENERALIZED
LOCATION: GENERALIZED
LOCATION: JAW;NECK
LOCATION: HIP

## 2024-09-16 ASSESSMENT — PAIN - FUNCTIONAL ASSESSMENT
PAIN_FUNCTIONAL_ASSESSMENT: ACTIVITIES ARE NOT PREVENTED

## 2024-09-16 ASSESSMENT — PAIN SCALES - GENERAL
PAINLEVEL_OUTOF10: 0
PAINLEVEL_OUTOF10: 2
PAINLEVEL_OUTOF10: 5
PAINLEVEL_OUTOF10: 5
PAINLEVEL_OUTOF10: 6
PAINLEVEL_OUTOF10: 7
PAINLEVEL_OUTOF10: 2
PAINLEVEL_OUTOF10: 7

## 2024-09-16 ASSESSMENT — PAIN DESCRIPTION - DESCRIPTORS
DESCRIPTORS: ACHING;DISCOMFORT
DESCRIPTORS: DISCOMFORT
DESCRIPTORS: ACHING
DESCRIPTORS: ACHING
DESCRIPTORS: DISCOMFORT

## 2024-09-16 ASSESSMENT — PAIN DESCRIPTION - ORIENTATION
ORIENTATION: RIGHT
ORIENTATION: RIGHT
ORIENTATION: LEFT

## 2024-09-16 NOTE — PROGRESS NOTES
Facility/Department: [facility] 8 REHAB UNIT  Occupational Therapy     Name: Mary Lira  : 1975  MRN: [id number]  Date of Service: 2024    Discharge Recommendations:  Home with Home health OT     Past Medical History:  has a past medical history of Anxiety, Apnea, CHF (congestive heart failure) (HCC), Diabetes mellitus (HCC), Hyperlipidemia, Hypertension, and Palliative care patient.  Past Surgical History:  has a past surgical history that includes Appendectomy and Carotid endarterectomy (Left, 2024).    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 hemiparesis  Prognosis: Good  History: New onset A-fib, anxiety, sleep apnea, CHF, DM, HLD, morbid obesity, and HTN  Activity Tolerance  Activity Tolerance: Patient Tolerated treatment well            Plan   Occupational Therapy Plan  Times Per Week: 3-5  Times Per Day: Once a day  Current Treatment Recommendations: Strengthening, Balance training, Endurance training, Equipment evaluation, education, & procurement, Patient/Caregiver education & training, Safety education & training, Self-Care / ADL, Home management training, Coordination training     Restrictions  Restrictions/Precautions  Restrictions/Precautions: Fall Risk  Required Braces or Orthoses?: No  Lower Extremity Weight Bearing Restrictions  Right Lower Extremity Weight Bearing: Weight Bearing As Tolerated    Subjective   General  Chart Reviewed: Yes, Orders  Patient assessed for rehabilitation services?: Yes  Additional Pertinent Hx: New onset A-fib, anxiety, sleep apnea, CHF, DM, HLD, morbid obesity, and HTN  Response to previous

## 2024-09-16 NOTE — PROGRESS NOTES
09/16/24 1300   Restrictions/Precautions   Restrictions/Precautions Fall Risk   Required Braces or Orthoses? No   Lower Extremity Weight Bearing Restrictions   Right Lower Extremity Weight Bearing Weight Bearing As Tolerated   Subjective   Pain hip 6/10   Transfers   Sit to Stand Stand by assistance   Stand to Sit Stand by assistance   Bed to Chair Contact guard assistance   Stand Pivot Transfers Stand by assistance   Car Transfer Contact guard assistance   Ambulation   Surface Level tile   Device Rolling Walker   Assistance Contact guard assistance   Quality of Gait reciprocal gait, looks really good. WB'ing through hands enough for RW to make squeaks   Gait Deviations Decreased step height;Decreased step length;Deviated path   Distance 70'   Ambulation 2   Surface - 2 level tile   Device 2 Rolling Walker   Quality of Gait 2 see above   Gait Deviations Decreased step height;Decreased arm swing;Decreased head and trunk rotation;Increased GURWINDER   Distance 25'   PT Exercises   Exercise Treatment LAQ w/ 2 lbs, ankle pumps

## 2024-09-16 NOTE — PROGRESS NOTES
Physical Therapy     09/16/24 1100   Restrictions/Precautions   Restrictions/Precautions Fall Risk   Required Braces or Orthoses? No   Lower Extremity Weight Bearing Restrictions   Right Lower Extremity Weight Bearing Weight Bearing As Tolerated   General   Additional Pertinent Hx pneumonia, lipoprotein metabolism disorder unspecified, DM 2, anxiety disorder, obesity, obstructive sleep apnea, heart failure   Diagnosis acute ischemic stroke, R side weakness, dysarthria, leukocytosis, PNA   Subjective   Subjective pt agreeable to tx   Subjective   Pain 4/10 R hip at rest; 6/10 post AMB   Transfers   Sit to Stand Stand by assistance   Stand to Sit Stand by assistance   Stand Pivot Transfers Stand by assistance   Comment short distance transfer from EOB <> recliner with RW SBA with good safety and no LOB   Ambulation   Surface Level tile   Device Rolling Walker   Assistance Stand by assistance   Quality of Gait slow mostly reciprocal. progressed to antalgic gait as pain in R hip increased   Gait Deviations Slow Mae;Decreased step height;Decreased step length;Deviated path   Distance 50'   Comments L turn   More Ambulation? Yes   Ambulation 2   Surface - 2 level tile   Device 2 Rolling Walker   Quality of Gait 2 see above   Distance 70'   Comments R turns   Ambulation 3   Surface - 3 level tile   Device 3 Rolling Walker   Gait Deviations Slow Mae;Decreased step length;Decreased step height   Distance 70' x 2   Comments L/R turns with 5' posterior steps no LOB   Stairs   # Steps  8   Stairs Height 6\"  (and 4'')   Rails Bilateral   Assistance Contact guard assistance;Stand by assistance   Comment pt with decreased anxiety able to perform task with step to pattern with no LOB or sign of distress.   Propulsion 1   Propulsion Manual   Level Level Tile   Method RUE;LUE   Level of Assistance Supervision   Distance 50' x 2   Activity Tolerance   Activity Tolerance Patient tolerated treatment well   Assessment    Assessment pt able to tolerate increased AMD distance and total amount SBA with no LOB or sign of distess. slight increase in antalgic gait pattern with time/distance due to increasing pain in R hip. sitting rest breaks in between all tasks for recovery and pain management. pt able to increase performance of steps from 4'' to 6'' with bilat rails CGA/SBA with no LOB and improved confidence. left in chair with all needs in reach.   Safety Devices   Type of Devices Gait belt  (Given to PT.)   PT Individual Minutes   Time In 1100   Time Out 1200   Minutes 60   Recommendation   Requires PT Follow-Up Yes     Electronically signed by Zuhair Weiss PTA on 9/16/2024 at 2:07 PM

## 2024-09-16 NOTE — PROGRESS NOTES
Patient:   Mary Lira  MR#:    [id number]   Room:    0817/817-02   YOB: 1975  Date of Progress Note: 9/16/2024  Time of Note                           8:03 AM  Consulting Physician:   Dario Triplett M.D.  Attending Physician:  Dario Triplett MD       CHIEF COMPLAINT: Right-sided weakness     Subjective  This 48 y.o. female   with history anxiety, sleep apnea, CHF, DM, HLD, morbid obesity, and HTN. She presented to [facility] ER on 9/3/24 with c/o right-sided weakness, slurred speech, headache, shortness of breath, cough with productive green sputum for about 1 week, nausea and diarrhea. Work-up in ER  Leukocytosis, Hg 10.5, Glucose 142, CR 1.2 And BUN 21. roponin borderline elevated.  Respiratory panel negative.  CT brain did not report any acute intracranial abnormality CTA Head/Neck reported 90% stenosis left ICA, 60% right ICA and Chest xray reported opacity left upper lobe. She received IV Ceftriaxone and IV Azithromycin in ER. She was admitted to the Hospitalist service with consult for neurology and vascular surgery. She was seen by neurologist Dr. Diaz and felt to have an acute ischemic stroke, patient was outside of the window for TNK. MRI done showed MRI brain showed left hemispheric small ischemic infarcts. She was placed on aspirin/Plavix and statin therapy. She was then seen by vascular surgeon Dr. Eliezer Palencia to address her severe left carotid stenosis. Dr. Palencia recommended a left carotid endarterectomy. Cardiology was consulted for surgery clearance. She was seen by Dr. Lazo. Lexiscan done showed ejection fraction 73% normal perfusion study no evidence of ischemia.  Dr. Lazo okayed to proceed with vascular surgery. Patient was in agreement and went for surgery on 9/6/24. She tolerated the procedure well. Patient was evaluated by SPT patient obtained 24 out of 30 possible points, indicative of mild cognitive-linguistic impairment (patient was 2/3 recall, 0/3 multi-step  functional mobility ;Decreased ADL status;Decreased ROM;Decreased strength;Decreased balance;Decreased high-level IADLs;Decreased endurance;Decreased coordination   Assessment Tx completed. Pt focused on R UE strengthening along with R hand FMC. Pt completed functional t/fers using RW with CGA. Pt had no pain during session today.   Treatment Diagnosis 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 with Right hemiparesis   Prognosis Good   Decision Making Low Complexity   History New onset A-fib, anxiety, sleep apnea, CHF, DM, HLD, morbid obesity, and HTN   Occupational Therapy Plan   Times Per Week 3-5   Times Per Day Once a day   Current Treatment Recommendations Strengthening;Balance training;Endurance training;Equipment evaluation, education, & procurement;Patient/Caregiver education & training;Safety education & training;Self-Care / ADL;Home management training;Coordination training   Safety Devices   Type of Devices Call light within reach;Chair alarm in place;Heels elevated for pressure relief;Left in chair   Time Code Minutes    Timed Code Treatment Minutes 30 Minutes   OT Individual Minutes   Time In 1430   Time Out 1500   Minutes 30         Electronically signed by Paloma Culp OT on 9/14/2024 at 3:23 PM                       RECORD REVIEW: Previous medical records, medications were reviewed at today's visit    IMPRESSION:     1.  Left hemisphere stroke status post left carotid endarterectomy-aspirin/statin-PT/OT/SLP  2.  New onset A-fib/DVT prophylaxis-Eliquis, diltiazem, metoprolol  3.  Anxiety-BuSpar, trazodone at night.  Zoloft  4.  Hyperlipidemia-Lipitor  5.  CHF-Lasix  6.  Diabetes-insulin and monitoring  7.  GI-bowel regimen  Continue Lidoderm and tizanidine for sciatica.  Increase gabapentin for sciatica.  Mag oxide helping leg cramps  ELOS: Staff on Wednesday

## 2024-09-16 NOTE — PLAN OF CARE
Problem: Safety - Adult  Goal: Free from fall injury  9/15/2024 2250 by Gracie Moeller RN  Outcome: Progressing  9/15/2024 1334 by Nae Treviño LPN  Outcome: Progressing  Flowsheets (Taken 9/15/2024 1333)  Free From Fall Injury: Instruct family/caregiver on patient safety     Problem: Skin/Tissue Integrity  Goal: Absence of new skin breakdown  Description: 1.  Monitor for areas of redness and/or skin breakdown  2.  Assess vascular access sites hourly  3.  Every 4-6 hours minimum:  Change oxygen saturation probe site  4.  Every 4-6 hours:  If on nasal continuous positive airway pressure, respiratory therapy assess nares and determine need for appliance change or resting period.  9/15/2024 2250 by Gracie Moeller RN  Outcome: Progressing  9/15/2024 1334 by Nae Treviño LPN  Outcome: Progressing     Problem: ABCDS Injury Assessment  Goal: Absence of physical injury  9/15/2024 2250 by Gracie Moeller RN  Outcome: Progressing  9/15/2024 1334 by Nae Treviño LPN  Outcome: Progressing  Flowsheets (Taken 9/15/2024 1333)  Absence of Physical Injury: Implement safety measures based on patient assessment     Problem: Discharge Planning  Goal: Discharge to home or other facility with appropriate resources  Outcome: Progressing

## 2024-09-16 NOTE — PATIENT CARE CONFERENCE
[facility] ACUTE INPATIENT REHABILITATION  TEAM CONFERENCE NOTE    Date: 2024  Patient Name: Mary Lira        MRN: [id number]    : 1975  (48 y.o.)  Gender: female   Referring Practitioner: Sandeep Diaz MD, Howie Matute MD  Diagnosis: acute ischemic stroke, R side weakness, dysarthria, leukocytosis, PNA      PHYSICAL THERAPY  GROSS ASSESSMENT       BED MOBILITY  Bed mobility  Rolling to Left: Supervision  Rolling to Right: Supervision  Supine to Sit: Minimal assistance  Sit to Supine: Minimal assistance  Bed Mobility Comments: in recliner       TRANSFERS  Transfers  Sit to Stand: Stand by assistance  Stand to Sit: Stand by assistance  Bed to Chair: Contact guard assistance  Stand Pivot Transfers: Stand by assistance  Car Transfer: Contact guard assistance  Comment: short distance transfer from EOB <> recliner with RW SBA with good safety and no LOB  WHEELCHAIR PROPULSION  Propulsion 1  Propulsion: Manual  Level: Level Tile  Method: THEE RUIZ  Level of Assistance: Supervision  Description/ Details: difficulty advancing through doorways  Distance: 75ft  AMBULATION  Ambulation  Surface: Level tile  Device: Rolling Walker  Other Apparatus: Wheelchair follow (due to previous low bp by nsg)  Assistance: Contact guard assistance, Stand by assistance  Quality of Gait: reciprocal gt with improved wbing through LEs and no audible squeaking of device  Gait Deviations: Decreased step height, Decreased step length, Deviated path  Distance: 150  Comments: bp 132/68 post amb  More Ambulation?: Yes  STAIRS  Stairs  # Steps : 8  Stairs Height: 6\" (and 4'')  Rails: Bilateral  Assistance: Contact guard assistance, Stand by assistance  Comment: pt with decreased anxiety able to perform task with step to pattern with no LOB or sign of distress.  GOALS:  Short Term Goals  Time Frame for Short Term Goals: 1 wk  Short Term Goal 1: supervision bed mobiliyt  Short Term Goal 2: supervision transfers  Short Term

## 2024-09-16 NOTE — PROGRESS NOTES
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  Past Surgical History:   has a past surgical history that includes Appendectomy and Carotid endarterectomy (Left, 2024).     24 1345   Restrictions/Precautions   Restrictions/Precautions Fall Risk   General   Additional Pertinent Hx New onset A-fib, anxiety, sleep apnea, CHF, DM, HLD, morbid obesity, and HTN   Diagnosis 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 with Right hemiparesis   Balance   Standing Balance Stand by assistance   Standing Balance   Time 4 mins  (washing machine)   Activity laundry task, toileting   Functional Mobility   Functional - Mobility Device Rolling Walker   Activity To/from bathroom   Assist Level Stand by assistance   Transfers   Sit to stand Stand by assistance   Stand to sit Stand by assistance   OT Exercises   Exercise Treatment 2# Tbar   Activity Tolerance   Activity Tolerance Patient Tolerated treatment well   Assessment   Performance deficits / Impairments Decreased functional mobility ;Decreased ADL status;Decreased ROM;Decreased strength;Decreased balance;Decreased high-level IADLs;Decreased endurance;Decreased coordination   Treatment Diagnosis Left hemisphere stroke status post carotid  arterectomy with Right hemiparesis   Time Code Minutes    Timed Code Treatment Minutes 45 Minutes   OT Individual Minutes   Time In 1345   Time Out 1430   Minutes 45        09/16/24 1345   Light Housekeeping   Light Housekeeping Level Walker   Light Housekeeping Level of Assistance Stand by assistance   Light Housekeeping transferring items from washer into dryer

## 2024-09-16 NOTE — PROGRESS NOTES
Patient has provided an alternate address where she and her daughter will be staying at discharge:     [facility] [place] [address]    Will provide this address to home health when referral is made.     CMG date is 9/26.

## 2024-09-17 ENCOUNTER — APPOINTMENT (OUTPATIENT)
Dept: GENERAL RADIOLOGY | Age: 49
End: 2024-09-17
Attending: PSYCHIATRY & NEUROLOGY
Payer: MEDICAID

## 2024-09-17 LAB
GLUCOSE BLD-MCNC: 152 MG/DL (ref 70–99)
GLUCOSE BLD-MCNC: 182 MG/DL (ref 70–99)
GLUCOSE BLD-MCNC: 194 MG/DL (ref 70–99)
GLUCOSE BLD-MCNC: 222 MG/DL (ref 70–99)
GLUCOSE BLD-MCNC: 261 MG/DL (ref 70–99)
PERFORMED ON: ABNORMAL

## 2024-09-17 PROCEDURE — 99232 SBSQ HOSP IP/OBS MODERATE 35: CPT | Performed by: PSYCHIATRY & NEUROLOGY

## 2024-09-17 PROCEDURE — 97110 THERAPEUTIC EXERCISES: CPT

## 2024-09-17 PROCEDURE — 82962 GLUCOSE BLOOD TEST: CPT

## 2024-09-17 PROCEDURE — 72100 X-RAY EXAM L-S SPINE 2/3 VWS: CPT

## 2024-09-17 PROCEDURE — 97116 GAIT TRAINING THERAPY: CPT

## 2024-09-17 PROCEDURE — 6370000000 HC RX 637 (ALT 250 FOR IP): Performed by: PSYCHIATRY & NEUROLOGY

## 2024-09-17 PROCEDURE — 1180000000 HC REHAB R&B

## 2024-09-17 PROCEDURE — 97530 THERAPEUTIC ACTIVITIES: CPT

## 2024-09-17 PROCEDURE — 6370000000 HC RX 637 (ALT 250 FOR IP): Performed by: STUDENT IN AN ORGANIZED HEALTH CARE EDUCATION/TRAINING PROGRAM

## 2024-09-17 RX ADMIN — METOPROLOL SUCCINATE 50 MG: 50 TABLET, EXTENDED RELEASE ORAL at 10:19

## 2024-09-17 RX ADMIN — HYDROCODONE BITARTRATE AND ACETAMINOPHEN 1 TABLET: 7.5; 325 TABLET ORAL at 10:19

## 2024-09-17 RX ADMIN — TIZANIDINE 4 MG: 4 TABLET ORAL at 16:34

## 2024-09-17 RX ADMIN — FUROSEMIDE 20 MG: 20 TABLET ORAL at 10:20

## 2024-09-17 RX ADMIN — INSULIN LISPRO 3 UNITS: 100 INJECTION, SOLUTION INTRAVENOUS; SUBCUTANEOUS at 16:26

## 2024-09-17 RX ADMIN — BUSPIRONE HYDROCHLORIDE 5 MG: 5 TABLET ORAL at 10:20

## 2024-09-17 RX ADMIN — DILTIAZEM HYDROCHLORIDE 300 MG: 180 CAPSULE, COATED, EXTENDED RELEASE ORAL at 10:20

## 2024-09-17 RX ADMIN — ASPIRIN 81 MG CHEWABLE TABLET 81 MG: 81 TABLET CHEWABLE at 10:19

## 2024-09-17 RX ADMIN — TIZANIDINE 4 MG: 4 TABLET ORAL at 20:51

## 2024-09-17 RX ADMIN — GABAPENTIN 400 MG: 400 CAPSULE ORAL at 10:19

## 2024-09-17 RX ADMIN — HYDROCODONE BITARTRATE AND ACETAMINOPHEN 1 TABLET: 7.5; 325 TABLET ORAL at 04:10

## 2024-09-17 RX ADMIN — Medication 400 MG: at 20:51

## 2024-09-17 RX ADMIN — APIXABAN 5 MG: 5 TABLET, FILM COATED ORAL at 10:19

## 2024-09-17 RX ADMIN — HYDROCODONE BITARTRATE AND ACETAMINOPHEN 1 TABLET: 7.5; 325 TABLET ORAL at 16:26

## 2024-09-17 RX ADMIN — TIZANIDINE 4 MG: 4 TABLET ORAL at 10:19

## 2024-09-17 RX ADMIN — INSULIN LISPRO 3 UNITS: 100 INJECTION, SOLUTION INTRAVENOUS; SUBCUTANEOUS at 10:30

## 2024-09-17 RX ADMIN — GABAPENTIN 400 MG: 400 CAPSULE ORAL at 20:51

## 2024-09-17 RX ADMIN — INSULIN LISPRO 3 UNITS: 100 INJECTION, SOLUTION INTRAVENOUS; SUBCUTANEOUS at 12:11

## 2024-09-17 RX ADMIN — ATORVASTATIN CALCIUM 40 MG: 40 TABLET, FILM COATED ORAL at 20:50

## 2024-09-17 RX ADMIN — TRAZODONE HYDROCHLORIDE 100 MG: 100 TABLET ORAL at 20:51

## 2024-09-17 RX ADMIN — HYDROCODONE BITARTRATE AND ACETAMINOPHEN 1 TABLET: 7.5; 325 TABLET ORAL at 20:51

## 2024-09-17 RX ADMIN — INSULIN GLARGINE 30 UNITS: 100 INJECTION, SOLUTION SUBCUTANEOUS at 10:20

## 2024-09-17 RX ADMIN — SENNOSIDES AND DOCUSATE SODIUM 2 TABLET: 50; 8.6 TABLET ORAL at 10:19

## 2024-09-17 RX ADMIN — SERTRALINE HYDROCHLORIDE 100 MG: 100 TABLET ORAL at 10:20

## 2024-09-17 RX ADMIN — GABAPENTIN 400 MG: 400 CAPSULE ORAL at 13:45

## 2024-09-17 RX ADMIN — APIXABAN 5 MG: 5 TABLET, FILM COATED ORAL at 20:50

## 2024-09-17 ASSESSMENT — PAIN DESCRIPTION - ORIENTATION
ORIENTATION: RIGHT;MID
ORIENTATION: RIGHT

## 2024-09-17 ASSESSMENT — PAIN SCALES - GENERAL
PAINLEVEL_OUTOF10: 5
PAINLEVEL_OUTOF10: 7
PAINLEVEL_OUTOF10: 4
PAINLEVEL_OUTOF10: 2
PAINLEVEL_OUTOF10: 8
PAINLEVEL_OUTOF10: 6
PAINLEVEL_OUTOF10: 2
PAINLEVEL_OUTOF10: 3

## 2024-09-17 ASSESSMENT — 9 HOLE PEG TEST
TESTTIME_SECONDS: 27
TEST_RESULT: FUNCTIONAL

## 2024-09-17 ASSESSMENT — PAIN DESCRIPTION - LOCATION
LOCATION: HIP
LOCATION: HIP;BACK

## 2024-09-17 ASSESSMENT — PAIN DESCRIPTION - DESCRIPTORS
DESCRIPTORS: ACHING
DESCRIPTORS: ACHING;THROBBING
DESCRIPTORS: ACHING;DISCOMFORT
DESCRIPTORS: ACHING

## 2024-09-17 ASSESSMENT — PAIN - FUNCTIONAL ASSESSMENT: PAIN_FUNCTIONAL_ASSESSMENT: ACTIVITIES ARE NOT PREVENTED

## 2024-09-17 NOTE — PROGRESS NOTES
Nutrition Assessment     Type and Reason for Visit: Reassess    Malnutrition Assessment:  Malnutrition Status: No malnutrition    Nutrition Assessment:  Pt remains adequately nourished. PO intake avg >75% of all meals. Wt is stable. BG improving, ranging 146-231mg/dL over the past 48 hrs. No nutrition-related needs identified at this time. Continue current POC.    Nutrition Related Findings:   CxtF9p=8.4% (9/3/24); Home DM meds: Basaglar 40u daily, Glipizide 5mg BID, Humalog 10u TIDAC Wound Type: Skin Tears    Current Nutrition Therapies:    ADULT DIET; Regular; 5 carb choices (75 gm/meal)    Anthropometric Measures:  Height: 160 cm (5' 3\")  Current Body Wt: 115.7 kg (255 lb)   BMI: 45.2    Nutrition Diagnosis:   No nutrition diagnosis at this time     Nutrition Interventions:   Food and/or Nutrient Delivery: Continue Current Diet  Nutrition Education/Counseling: No recommendation at this time  Coordination of Nutrition Care: Continue to monitor while inpatient       Goals:  Previous Goal Met: Goal(s) Achieved  Goals: PO intake 50% or greater       Nutrition Monitoring and Evaluation:   Behavioral-Environmental Outcomes: None Identified  Food/Nutrient Intake Outcomes: Food and Nutrient Intake  Physical Signs/Symptoms Outcomes: Biochemical Data, Nutrition Focused Physical Findings, Weight, Skin    Discharge Planning:    Continue current diet     Beatris Espinosa, MS, RD, LD, CDCES  Contact: [phone]

## 2024-09-17 NOTE — PLAN OF CARE
Problem: Safety - Adult  Goal: Free from fall injury  Outcome: Progressing     Problem: Skin/Tissue Integrity  Goal: Absence of new skin breakdown  Description: 1.  Monitor for areas of redness and/or skin breakdown  2.  Assess vascular access sites hourly  3.  Every 4-6 hours minimum:  Change oxygen saturation probe site  4.  Every 4-6 hours:  If on nasal continuous positive airway pressure, respiratory therapy assess nares and determine need for appliance change or resting period.  Outcome: Progressing     Problem: ABCDS Injury Assessment  Goal: Absence of physical injury  Outcome: Progressing     Problem: Discharge Planning  Goal: Discharge to home or other facility with appropriate resources  Outcome: Progressing  Flowsheets (Taken 9/16/2024 2120)  Discharge to home or other facility with appropriate resources: Identify barriers to discharge with patient and caregiver     Problem: Pain  Goal: Verbalizes/displays adequate comfort level or baseline comfort level  Outcome: Progressing     Problem: Chronic Conditions and Co-morbidities  Goal: Patient's chronic conditions and co-morbidity symptoms are monitored and maintained or improved  Outcome: Progressing  Flowsheets (Taken 9/16/2024 2120)  Care Plan - Patient's Chronic Conditions and Co-Morbidity Symptoms are Monitored and Maintained or Improved: Monitor and assess patient's chronic conditions and comorbid symptoms for stability, deterioration, or improvement     Problem: Neurosensory - Adult  Goal: Achieves stable or improved neurological status  Outcome: Progressing  Flowsheets (Taken 9/16/2024 2120)  Achieves stable or improved neurological status: Assess for and report changes in neurological status  Goal: Achieves maximal functionality and self care  Outcome: Progressing     Problem: Respiratory - Adult  Goal: Achieves optimal ventilation and oxygenation  Outcome: Progressing  Flowsheets (Taken 9/16/2024 2120)  Achieves optimal ventilation and

## 2024-09-17 NOTE — PROGRESS NOTES
Physical Therapy    Name: Mary Lira  MRN: [id number]  Date of service: 9/17/2024 09/17/24 0815   Restrictions/Precautions   Restrictions/Precautions Fall Risk   Required Braces or Orthoses? No   Lower Extremity Weight Bearing Restrictions   Right Lower Extremity Weight Bearing Weight Bearing As Tolerated   General   Diagnosis acute ischemic stroke, R side weakness, dysarthria, leukocytosis, PNA   General Comment   Comments Pt in recliner   Subjective   Subjective agreed to therapy, reports they will be doing xrays sometime on R hip due to pain since stroke.   Subjective   Subjective R back and hip   Pain 7/10   Bed mobility   Bed Mobility Comments in recliner   Transfers   Sit to Stand Stand by assistance   Stand to Sit Stand by assistance   Ambulation   Surface Level tile   Device Rolling Walker   Assistance Contact guard assistance   Distance 5ft   Comments recliner to    Ambulation 2   Surface - 2 level tile   Device 2 Rolling Walker   Assistance 2 Contact guard assistance   Quality of Gait 2 reciprocal gait   Gait Deviations Decreased step height;Decreased arm swing;Decreased head and trunk rotation;Increased GURWINDER   Distance 80ft   Comments turn half way   Ambulation 3   Surface - 3 level tile   Device 3 Rolling Walker   Assistance 3 Contact guard assistance   Quality of Gait 3 same as above   Gait Deviations Slow Mae;Decreased step length;Decreased step height   Distance 100ft   Comments slight fatigue after ambulation   Propulsion 1   Propulsion Manual   Level Level Tile   Method RUE;LUE   Level of Assistance Supervision   Description/ Details difficulty advancing through doorways   Distance 75ft   PT Exercises   Exercise Treatment sitting in WC: ankle pums, LAQ, marching, hip ABD, and hip ADD x 15  (LAQ and marching 1.5#)   Circulation/Endurance Exercises STS x 5 from WC  (Pt counting \"1,2,3\" each time to stand, slow and controlled with good hand placement.)   Patient Goals    Patient Goals  go

## 2024-09-17 NOTE — PROGRESS NOTES
Facility/Department: [facility] 8 REHAB UNIT  Occupational Therapy Treatment Note    Name: Mary Lira  : 1975  MRN: [id number]  Date of Service: 2024    Discharge Recommendations:  Home with Home health OT          Past Medical History:  has a past medical history of Anxiety, Apnea, CHF (congestive heart failure) (HCC), Diabetes mellitus (HCC), Hyperlipidemia, Hypertension, and Palliative care patient.  Past Surgical History:  has a past surgical history that includes Appendectomy and Carotid endarterectomy (Left, 2024).    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 hemiparesis    Assessment   Performance deficits / Impairments: Decreased functional mobility ;Decreased ADL status;Decreased ROM;Decreased strength;Decreased balance;Decreased high-level IADLs;Decreased endurance;Decreased coordination  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 hemiparesis  Activity Tolerance  Activity Tolerance: Patient Tolerated treatment well              Plan   Occupational Therapy Plan  Times Per Week: 3-5  Times Per Day: Once a day  Current Treatment Recommendations: Strengthening, Balance training, Endurance training, Equipment evaluation, education, & procurement, Patient/Caregiver education & training, Safety education & training, Self-Care / ADL, Home management training, Coordination training     Restrictions  Restrictions/Precautions  Restrictions/Precautions: Fall Risk  Required Braces or Orthoses?: No  Lower Extremity Weight Bearing Restrictions  Right Lower Extremity Weight Bearing: Weight Bearing As Tolerated    Subjective   General  Chart Reviewed: Yes, Orders  Patient assessed for rehabilitation services?: Yes  Additional Pertinent Hx: New onset A-fib, anxiety, sleep apnea, CHF, DM, HLD, morbid obesity, and HTN  Response to previous treatment: Patient with no complaints from previous session  Family / Caregiver Present:

## 2024-09-17 NOTE — PROGRESS NOTES
Physical Therapy  Name: Mary Lira  MRN:  [id number]  Date of service:  9/17/2024 09/17/24 1400   Vitals   Pulse 68   Heart Rate Source Monitor   /68   MAP (Calculated) 89   BP Location Right lower arm   BP Method Automatic   Patient Position Sitting  (immediately post amb)   Ambulation   Surface Level tile   Device Rolling Walker   Other Apparatus Wheelchair follow  (due to previous low bp by nsg)   Assistance Contact guard assistance;Stand by assistance   Quality of Gait reciprocal gt with improved wbing through LEs and no audible squeaking of device   Gait Deviations Decreased step height;Decreased step length;Deviated path   Distance 150   Comments bp 132/68 post amb   Ambulation 2   Surface - 2 level tile   Device 2 Rolling Walker   Assistance 2 Contact guard assistance;Stand by assistance   Quality of Gait 2 as above   Gait Deviations Decreased step height;Decreased arm swing;Decreased head and trunk rotation;Increased GURWINDER   Distance 175'   Comments bp 125/59 with 79HR post amb   PT Exercises   A/AROM Exercises ADD sets x 20 with ball   Resistive Exercises man resist x 20: laq, hs curls, hip abd, hip flex, hip ext   Assessment   Assessment Pt seems to be tolerating activity more with greatest discomfort in supine position and reporting most comfort found with standing activities. Pt cooperative and gives good effort. Inc amb distance and contact guard to standy by assist.   PT Individual Minutes   Time In 1350   Time Out 1440   Minutes 50         Electronically signed by Morelia Chakraborty PTA on 9/17/2024 at 3:31 PM

## 2024-09-17 NOTE — PROGRESS NOTES
Patient:   Mary Lira  MR#:    [id number]   Room:    0817/817-02   YOB: 1975  Date of Progress Note: 9/17/2024  Time of Note                           8:40 AM  Consulting Physician:   Dario Triplett M.D.  Attending Physician:  Dario Triplett MD       CHIEF COMPLAINT: Right-sided weakness     Subjective  This 48 y.o. female   with history anxiety, sleep apnea, CHF, DM, HLD, morbid obesity, and HTN. She presented to [facility] ER on 9/3/24 with c/o right-sided weakness, slurred speech, headache, shortness of breath, cough with productive green sputum for about 1 week, nausea and diarrhea. Work-up in ER  Leukocytosis, Hg 10.5, Glucose 142, CR 1.2 And BUN 21. roponin borderline elevated.  Respiratory panel negative.  CT brain did not report any acute intracranial abnormality CTA Head/Neck reported 90% stenosis left ICA, 60% right ICA and Chest xray reported opacity left upper lobe. She received IV Ceftriaxone and IV Azithromycin in ER. She was admitted to the Hospitalist service with consult for neurology and vascular surgery. She was seen by neurologist Dr. Diaz and felt to have an acute ischemic stroke, patient was outside of the window for TNK. MRI done showed MRI brain showed left hemispheric small ischemic infarcts. She was placed on aspirin/Plavix and statin therapy. She was then seen by vascular surgeon Dr. Eliezer Palencia to address her severe left carotid stenosis. Dr. Palencia recommended a left carotid endarterectomy. Cardiology was consulted for surgery clearance. She was seen by Dr. Lazo. Lexiscan done showed ejection fraction 73% normal perfusion study no evidence of ischemia.  Dr. Lazo okayed to proceed with vascular surgery. Patient was in agreement and went for surgery on 9/6/24. She tolerated the procedure well. Patient was evaluated by SPT patient obtained 24 out of 30 possible points, indicative of mild cognitive-linguistic impairment (patient was 2/3 recall, 0/3 multi-step  Decreased functional mobility ;Decreased ADL status;Decreased ROM;Decreased strength;Decreased balance;Decreased high-level IADLs;Decreased endurance;Decreased coordination   Treatment Diagnosis 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 with Right hemiparesis   Time Code Minutes    Timed Code Treatment Minutes 45 Minutes   OT Individual Minutes   Time In 1345   Time Out 1430   Minutes 45           09/16/24 1345   Light Housekeeping   Light Housekeeping Level Walker   Light Housekeeping Level of Assistance Stand by assistance   Light Housekeeping transferring items from washer into dryer                Cosigned by: Tawny Stacy OT at 9/16/2024  5:50 PM       RECORD REVIEW: Previous medical records, medications were reviewed at today's visit    IMPRESSION:     1.  Left hemisphere stroke status post left carotid endarterectomy-aspirin/statin-PT/OT/SLP  2.  New onset A-fib/DVT prophylaxis-Eliquis, diltiazem, metoprolol  3.  Anxiety-BuSpar, trazodone at night.  Zoloft  4.  Hyperlipidemia-Lipitor  5.  CHF-Lasix  6.  Diabetes-insulin and monitoring  7.  GI-bowel regimen  Continue Lidoderm and tizanidine for sciatica.  Continue gabapentin for sciatica.  Not helping but has a history of peripheral edema and hate to increase it.  Check lumbar x-ray  Mag oxide helping leg cramps  ELOS: Staff tomorrow

## 2024-09-18 LAB
GLUCOSE BLD-MCNC: 131 MG/DL (ref 70–99)
GLUCOSE BLD-MCNC: 216 MG/DL (ref 70–99)
GLUCOSE BLD-MCNC: 221 MG/DL (ref 70–99)
GLUCOSE BLD-MCNC: 237 MG/DL (ref 70–99)
PERFORMED ON: ABNORMAL

## 2024-09-18 PROCEDURE — 94760 N-INVAS EAR/PLS OXIMETRY 1: CPT

## 2024-09-18 PROCEDURE — 82962 GLUCOSE BLOOD TEST: CPT

## 2024-09-18 PROCEDURE — 99233 SBSQ HOSP IP/OBS HIGH 50: CPT | Performed by: PSYCHIATRY & NEUROLOGY

## 2024-09-18 PROCEDURE — 97110 THERAPEUTIC EXERCISES: CPT

## 2024-09-18 PROCEDURE — 6370000000 HC RX 637 (ALT 250 FOR IP): Performed by: PSYCHIATRY & NEUROLOGY

## 2024-09-18 PROCEDURE — 97530 THERAPEUTIC ACTIVITIES: CPT

## 2024-09-18 PROCEDURE — 97535 SELF CARE MNGMENT TRAINING: CPT

## 2024-09-18 PROCEDURE — 1180000000 HC REHAB R&B

## 2024-09-18 PROCEDURE — 6370000000 HC RX 637 (ALT 250 FOR IP): Performed by: STUDENT IN AN ORGANIZED HEALTH CARE EDUCATION/TRAINING PROGRAM

## 2024-09-18 PROCEDURE — 97116 GAIT TRAINING THERAPY: CPT

## 2024-09-18 RX ADMIN — SERTRALINE HYDROCHLORIDE 100 MG: 100 TABLET ORAL at 08:16

## 2024-09-18 RX ADMIN — GABAPENTIN 400 MG: 400 CAPSULE ORAL at 21:24

## 2024-09-18 RX ADMIN — TIZANIDINE 4 MG: 4 TABLET ORAL at 14:34

## 2024-09-18 RX ADMIN — INSULIN LISPRO 3 UNITS: 100 INJECTION, SOLUTION INTRAVENOUS; SUBCUTANEOUS at 08:23

## 2024-09-18 RX ADMIN — INSULIN LISPRO 1 UNITS: 100 INJECTION, SOLUTION INTRAVENOUS; SUBCUTANEOUS at 08:24

## 2024-09-18 RX ADMIN — DILTIAZEM HYDROCHLORIDE 300 MG: 180 CAPSULE, COATED, EXTENDED RELEASE ORAL at 08:16

## 2024-09-18 RX ADMIN — HYDROCODONE BITARTRATE AND ACETAMINOPHEN 1 TABLET: 7.5; 325 TABLET ORAL at 21:24

## 2024-09-18 RX ADMIN — ATORVASTATIN CALCIUM 40 MG: 40 TABLET, FILM COATED ORAL at 21:24

## 2024-09-18 RX ADMIN — ASPIRIN 81 MG CHEWABLE TABLET 81 MG: 81 TABLET CHEWABLE at 08:17

## 2024-09-18 RX ADMIN — HYDROCODONE BITARTRATE AND ACETAMINOPHEN 1 TABLET: 7.5; 325 TABLET ORAL at 16:55

## 2024-09-18 RX ADMIN — BUSPIRONE HYDROCHLORIDE 5 MG: 5 TABLET ORAL at 08:17

## 2024-09-18 RX ADMIN — TIZANIDINE 4 MG: 4 TABLET ORAL at 08:17

## 2024-09-18 RX ADMIN — APIXABAN 5 MG: 5 TABLET, FILM COATED ORAL at 21:24

## 2024-09-18 RX ADMIN — FUROSEMIDE 20 MG: 20 TABLET ORAL at 08:17

## 2024-09-18 RX ADMIN — GABAPENTIN 400 MG: 400 CAPSULE ORAL at 14:34

## 2024-09-18 RX ADMIN — INSULIN GLARGINE 30 UNITS: 100 INJECTION, SOLUTION SUBCUTANEOUS at 08:15

## 2024-09-18 RX ADMIN — INSULIN LISPRO 1 UNITS: 100 INJECTION, SOLUTION INTRAVENOUS; SUBCUTANEOUS at 11:59

## 2024-09-18 RX ADMIN — HYDROCODONE BITARTRATE AND ACETAMINOPHEN 1 TABLET: 7.5; 325 TABLET ORAL at 11:12

## 2024-09-18 RX ADMIN — INSULIN LISPRO 3 UNITS: 100 INJECTION, SOLUTION INTRAVENOUS; SUBCUTANEOUS at 11:59

## 2024-09-18 RX ADMIN — SENNOSIDES AND DOCUSATE SODIUM 2 TABLET: 50; 8.6 TABLET ORAL at 08:16

## 2024-09-18 RX ADMIN — GABAPENTIN 400 MG: 400 CAPSULE ORAL at 08:17

## 2024-09-18 RX ADMIN — METOPROLOL SUCCINATE 50 MG: 50 TABLET, EXTENDED RELEASE ORAL at 08:17

## 2024-09-18 RX ADMIN — Medication 400 MG: at 21:24

## 2024-09-18 RX ADMIN — INSULIN LISPRO 3 UNITS: 100 INJECTION, SOLUTION INTRAVENOUS; SUBCUTANEOUS at 16:53

## 2024-09-18 RX ADMIN — TRAZODONE HYDROCHLORIDE 100 MG: 100 TABLET ORAL at 21:24

## 2024-09-18 RX ADMIN — APIXABAN 5 MG: 5 TABLET, FILM COATED ORAL at 08:17

## 2024-09-18 RX ADMIN — TIZANIDINE 4 MG: 4 TABLET ORAL at 21:24

## 2024-09-18 RX ADMIN — HYDROCODONE BITARTRATE AND ACETAMINOPHEN 1 TABLET: 7.5; 325 TABLET ORAL at 04:37

## 2024-09-18 ASSESSMENT — PAIN - FUNCTIONAL ASSESSMENT
PAIN_FUNCTIONAL_ASSESSMENT: ACTIVITIES ARE NOT PREVENTED

## 2024-09-18 ASSESSMENT — PAIN DESCRIPTION - DESCRIPTORS
DESCRIPTORS: ACHING
DESCRIPTORS: ACHING;DISCOMFORT
DESCRIPTORS: ACHING
DESCRIPTORS: ACHING

## 2024-09-18 ASSESSMENT — PAIN DESCRIPTION - LOCATION
LOCATION: BACK;HIP
LOCATION: HIP
LOCATION: BACK;HIP
LOCATION: HIP;BACK

## 2024-09-18 ASSESSMENT — PAIN SCALES - GENERAL
PAINLEVEL_OUTOF10: 5
PAINLEVEL_OUTOF10: 5
PAINLEVEL_OUTOF10: 3
PAINLEVEL_OUTOF10: 3
PAINLEVEL_OUTOF10: 5
PAINLEVEL_OUTOF10: 0
PAINLEVEL_OUTOF10: 6
PAINLEVEL_OUTOF10: 4
PAINLEVEL_OUTOF10: 3
PAINLEVEL_OUTOF10: 5

## 2024-09-18 ASSESSMENT — PAIN DESCRIPTION - ORIENTATION
ORIENTATION: RIGHT
ORIENTATION: RIGHT
ORIENTATION: RIGHT;MID
ORIENTATION: RIGHT

## 2024-09-18 NOTE — PROGRESS NOTES
Clinical updates faxed to [employer] at [phone] to request continued stay dates. Will update chart when further dates are approved.     Electronically signed by LAURA Burton on 9/18/24 at 10:08 AM CDT

## 2024-09-18 NOTE — PROGRESS NOTES
[facility] Inpatient Rehabilitation Unit  Test for Patient [place] in the Areas of Transfers/Ambulation    Ambulation/Transfers   Independent ambulation in room with assistive device:  Yes     -Device Type:  RW  Independent transfers from wheelchair to surface in room:  Yes     Bathroom Transfers  Independent transfers to toilet: Yes   Independent transfers for shower:  Yes     Ambulation in hallway  Patient able to ambulate in hallway with device:   No          Inpatient Rehabilitation Nursing and Therapies feel as though the patient qualifies for an acute rehabilitation test for independence in the areas of transfers and ambulation prior to discharging from Inpatient Rehabilitation Unit at [facility].  This test for independence in the areas of transfers and ambulation must be agreed upon by the patient's physician, nurse, and therapists.        Nurse Approval:  Electronically signed by Jacey Gonzalez RN on 9/18/2024 at 10:11 AM     Physical Therapist Approval:  Electronically signed by Jayce Haskins PT on 9/18/24 at 9:21 AM CDT      Occupational Therapist Approval: Electronically signed by ALONSO Caba on 9/18/2024 at 8:48 AM

## 2024-09-18 NOTE — PROGRESS NOTES
09/18/24 0800   General   Chart Reviewed Yes   Subjective   Subjective Patient in recliner  dressed ready for therapy.   Subjective   Pain R Hip 5/10   Transfers   Sit to Stand Supervision   Stand to Sit Contact guard assistance  (Patient forgot to reach back for  arm of chair needed VC's.)   Bed to Chair Supervision;Contact guard assistance  (Recliner > WC)   Ambulation   Surface Level tile   Device Rolling Walker   Assistance Supervision   Quality of Gait Steady NO LOB   Distance 150   Wheelchair Activities   Propulsion Yes   Propulsion 1   Propulsion Manual   Level Level Tile   Method RUE;LUE   Level of Assistance Modified independent   Description/ Details With 3 turns   Distance 150   PT Exercises   Circulation/Endurance Exercises BL LE EX X 20 Reps  c 2.0 #   Activity Tolerance   Activity Tolerance Patient tolerated treatment well   Safety Devices   Type of Devices Left in chair;Chair alarm in place;Call light within reach       Electronically signed by Hugo Andrews PTA on 9/18/2024 at 9:02 AM

## 2024-09-18 NOTE — PROGRESS NOTES
Occupational Therapy  Facility/Department: [facility] 8 REHAB UNIT  Rehabilitation Occupational Therapy Daily Treatment Note    Date: 24  Patient Name: Mary Lira       Room: 0817/817-02  MRN: [id number]  Account: [id number]   : 1975  (48 y.o.) Gender: female        Diagnosis: 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 with Right hemiparesis  Additional Pertinent Hx: New onset A-fib, anxiety, sleep apnea, CHF, DM, HLD, morbid obesity, and HTN    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 hemiparesis   Past Medical History:  has a past medical history of Anxiety, Apnea, CHF (congestive heart failure) (HCC), Diabetes mellitus (HCC), Hyperlipidemia, Hypertension, and Palliative care patient.  Past Surgical History:   has a past surgical history that includes Appendectomy and Carotid endarterectomy (Left, 2024).     24 1525   Functional Mobility   Functional - Mobility Device Rolling Walker   Activity To/From therapy gym;To/from bathroom   Assist Level Stand by assistance   Toilet Transfers   Toilet - Technique Ambulating   Toilet Transfer Stand by assistance   OT Exercises   Exercise Treatment 2# HEP- 12 reps only R sh otherwise 20 reps BUE   Activity Tolerance   Activity Tolerance Patient Tolerated treatment well   Assessment   Performance deficits / Impairments Decreased functional mobility ;Decreased ADL status;Decreased ROM;Decreased strength;Decreased high-level IADLs;Decreased endurance   Assessment FM coordination WFLs but still has residual UE weakness.   Treatment Diagnosis 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 with Right hemiparesis   Time Code Minutes    Timed Code Treatment Minutes 45 Minutes   OT Individual Minutes   Time In 1525   Time Out 1610   Minutes 45

## 2024-09-18 NOTE — PROGRESS NOTES
Mary Lira  [id number]       09/18/24 1409 09/18/24 1410 09/18/24 1414   Restrictions/Precautions   Restrictions/Precautions Fall Risk  --   --    Lower Extremity Weight Bearing Restrictions   Right Lower Extremity Weight Bearing Weight Bearing As Tolerated  --   --    General   Additional Pertinent Hx pneumonia, lipoprotein metabolism disorder unspecified, DM 2, anxiety disorder, obesity, obstructive sleep apnea, heart failure  --   --    Diagnosis acute ischemic stroke, R side weakness, dysarthria, leukocytosis, PNA  --   --    Subjective   Subjective Pt. agreeable to therapy session.  --   --    Subjective   Pain  --  Back 4/10  --    Bed mobility   Rolling to Left  --   --   --    Rolling to Right  --   --   --    Supine to Sit  --   --   --    Sit to Supine  --   --   --    Scooting  --   --   --    Transfers   Sit to Stand  --  Modified independent  --    Stand to Sit  --  Modified independent  --    Lateral Transfers  --  Modified independent  (Using RW- Recliner to/from bed and W/C.)  --    Ambulation   Surface  --   --  Level tile   Device  --   --  Rolling Walker   Assistance  --   --  Stand by assistance   Quality of Gait  --   --  Steady NO LOB   Gait Deviations  --   --  Decreased step height;Decreased step length;Deviated path   Distance  --   --  150 x2   Comments  --   --  Incorporated turns.   PT Exercises   Exercise Treatment  --   --   --    Activity Tolerance   Activity Tolerance  --   --   --    Assessment   Assessment  --   --   --    Safety Devices   Type of Devices  --   --   --    PT Individual Minutes   Time In  --   --   --    Time Out  --   --   --    Minutes  --   --   --       09/18/24 1416 09/18/24 1422 09/18/24 1427   Restrictions/Precautions   Restrictions/Precautions  --   --   --    Lower Extremity Weight Bearing Restrictions   Right Lower Extremity Weight Bearing  --   --   --    General   Additional Pertinent Hx  --   --   --    Diagnosis  --   --   --    Subjective

## 2024-09-18 NOTE — PROGRESS NOTES
09/18/24 1028   Encounter Summary   Encounter Overview/Reason Spiritual/Emotional Needs   Service Provided For Patient   Referral/Consult From Palliative Care   Support System Children;Family members   Complexity of Encounter Moderate   Begin Time 0953   End Time  1008   Total Time Calculated 15 min   Spiritual/Emotional needs   Type Spiritual Support   Palliative Care   Type Palliative Care, Follow-up   Assessment/Intervention/Outcome   Assessment Coping;Hopeful   Intervention Active listening;Prayer (assurance of)/[place];Sustaining Presence/Ministry of presence   Outcome Acceptance;Expressed Gratitude;Receptive   Plan and Referrals   Plan/Referrals Continue to visit, (comment);Continue Support (comment)   Does the patient have a [facility] PCP? Yes    to follow up after discharge? No         This  visited with pt to follow up and provide spiritual care. Pt says rehab is going well. She is doing therapy and rehab after a stroke. Pt says she and her daughter have looked at apartments and applied for section 8 assistance. Pt says they will be starting over and will need furniture, groceries and cleaning items. This  spoke to the  on 8th floor and asked if she could put together a list of agencies who might help her when she discharges. Also provided sustaining presence, nurtured hope, and prayer. Pt expressed gratitude for spiritual care.       Spiritual Health Assessment/Progress Note  [facility]    (P) Spiritual/Emotional Needs,  ,  ,      Name: Mary Lira MRN: [id number]    Age: 48 y.o.     Sex: female   Language: English   Temple: Jewish   Acute ischemic stroke (HCC)     Date: 9/18/2024            Total Time Calculated: (P) 15 min              Spiritual Assessment continued in [facility] 8 REHAB UNIT        Referral/Consult From: (P) Palliative Care   Encounter Overview/Reason: (P) Spiritual/Emotional Needs  Service Provided For: (P) Patient    Kari, Belief, Meaning:

## 2024-09-18 NOTE — PROGRESS NOTES
Patient:   Mary Lira  MR#:    [id number]   Room:    0817/817-02   YOB: 1975  Date of Progress Note: 9/18/2024  Time of Note                           8:25 AM  Consulting Physician:   Dario Triplett M.D.  Attending Physician:  Dario Triplett MD       CHIEF COMPLAINT: Right-sided weakness     Subjective  This 48 y.o. female   with history anxiety, sleep apnea, CHF, DM, HLD, morbid obesity, and HTN. She presented to [facility] ER on 9/3/24 with c/o right-sided weakness, slurred speech, headache, shortness of breath, cough with productive green sputum for about 1 week, nausea and diarrhea. Work-up in ER  Leukocytosis, Hg 10.5, Glucose 142, CR 1.2 And BUN 21. roponin borderline elevated.  Respiratory panel negative.  CT brain did not report any acute intracranial abnormality CTA Head/Neck reported 90% stenosis left ICA, 60% right ICA and Chest xray reported opacity left upper lobe. She received IV Ceftriaxone and IV Azithromycin in ER. She was admitted to the Hospitalist service with consult for neurology and vascular surgery. She was seen by neurologist Dr. Diaz and felt to have an acute ischemic stroke, patient was outside of the window for TNK. MRI done showed MRI brain showed left hemispheric small ischemic infarcts. She was placed on aspirin/Plavix and statin therapy. She was then seen by vascular surgeon Dr. Eliezer Palencia to address her severe left carotid stenosis. Dr. Palencia recommended a left carotid endarterectomy. Cardiology was consulted for surgery clearance. She was seen by Dr. Lazo. Lexiscan done showed ejection fraction 73% normal perfusion study no evidence of ischemia.  Dr. Lazo okayed to proceed with vascular surgery. Patient was in agreement and went for surgery on 9/6/24. She tolerated the procedure well. Patient was evaluated by SPT patient obtained 24 out of 30 possible points, indicative of mild cognitive-linguistic impairment (patient was 2/3 recall, 0/3 multi-step  supervision  Short Term Goal 6: patient will complete HEP x 5 occasions to improve strength and endurance for ADLs     LTGs:  Long Term Goals  Time Frame for Long Term Goals : 2 weeks  Long Term Goal 1: patient will complete overall dressing with modified independence  Long Term Goal 2: patient will complete overall toileting with modified independence  Long Term Goal 3: patient will complete overall bathing with modified independence  Long Term Goal 4: patient will complete HEP with independence  Long Term Goal 5: patient will complete ambulatory home making task with modified independence     Assessment:  Performance deficits / Impairments: Decreased functional mobility , Decreased ADL status, Decreased ROM, Decreased strength, Decreased balance, Decreased high-level IADLs, Decreased endurance, Decreased coordination                        NUTRITION  Current Wt: Weight - Scale: 115.7 kg (255 lb) / Body mass index is 45.17 kg/m².  Admission Wt: Admission Body Weight: 118 kg (260 lb 2.3 oz)  Oral Diet Orders:   Regular; 5 carb choices   Oral Nutrition Supplement (ONS) Orders:  None  Pt remains adequately nourished. PO intake avg >75% of all meals. Wt is stable. BG improving, ranging 146-231mg/dL over the past 48 hrs. No nutrition-related needs identified at this time. Continue current POC. Please see nutrition note for details.         RECORD REVIEW: Previous medical records, medications were reviewed at today's visit    IMPRESSION:     1.  Left hemisphere stroke status post left carotid endarterectomy-aspirin/statin-PT/OT/SLP  2.  New onset A-fib/DVT prophylaxis-Eliquis, diltiazem, metoprolol  3.  Anxiety-BuSpar, trazodone at night.  Zoloft  4.  Hyperlipidemia-Lipitor  5.  CHF-Lasix  6.  Diabetes-insulin and monitoring  7.  GI-bowel regimen  Continue Lidoderm and tizanidine for sciatica.  Continue gabapentin for sciatica.  Not helping but has a history of peripheral edema and hate to increase it.  negative lumbar

## 2024-09-18 NOTE — PLAN OF CARE
Problem: Safety - Adult  Goal: Free from fall injury  Outcome: Progressing     Problem: Skin/Tissue Integrity  Goal: Absence of new skin breakdown  Description: 1.  Monitor for areas of redness and/or skin breakdown  2.  Assess vascular access sites hourly  3.  Every 4-6 hours minimum:  Change oxygen saturation probe site  4.  Every 4-6 hours:  If on nasal continuous positive airway pressure, respiratory therapy assess nares and determine need for appliance change or resting period.  Outcome: Progressing     Problem: ABCDS Injury Assessment  Goal: Absence of physical injury  Outcome: Progressing     Problem: Discharge Planning  Goal: Discharge to home or other facility with appropriate resources  Outcome: Progressing  Flowsheets (Taken 9/17/2024 1914)  Discharge to home or other facility with appropriate resources:   Identify barriers to discharge with patient and caregiver   Refer to discharge planning if patient needs post-hospital services based on physician order or complex needs related to functional status, cognitive ability or social support system     Problem: Pain  Goal: Verbalizes/displays adequate comfort level or baseline comfort level  Outcome: Progressing     Problem: Chronic Conditions and Co-morbidities  Goal: Patient's chronic conditions and co-morbidity symptoms are monitored and maintained or improved  Outcome: Progressing  Flowsheets (Taken 9/17/2024 1914)  Care Plan - Patient's Chronic Conditions and Co-Morbidity Symptoms are Monitored and Maintained or Improved: Monitor and assess patient's chronic conditions and comorbid symptoms for stability, deterioration, or improvement     Problem: Neurosensory - Adult  Goal: Achieves stable or improved neurological status  Outcome: Progressing  Flowsheets (Taken 9/17/2024 1914)  Achieves stable or improved neurological status: Assess for and report changes in neurological status  Goal: Achieves maximal functionality and self care  Outcome:

## 2024-09-18 NOTE — PROGRESS NOTES
Facility/Department: [facility] 8 REHAB UNIT  Occupational Therapy     Name: Mary Lira  : 1975  MRN: [id number]  Date of Service: 2024    Discharge Recommendations:  Home with Home health OT  OT Equipment Recommendations  Equipment Needed: Yes  Other: Pt. will d/c to first floor apartment with tub/shower.     Past Medical History:  has a past medical history of Anxiety, Apnea, CHF (congestive heart failure) (HCC), Diabetes mellitus (HCC), Hyperlipidemia, Hypertension, and Palliative care patient.  Past Surgical History:  has a past surgical history that includes Appendectomy and Carotid endarterectomy (Left, 2024).    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 hemiparesis      Assessment   Performance deficits / Impairments: Decreased functional mobility ;Decreased ROM;Decreased strength;Decreased high-level IADLs;Decreased endurance  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 hemiparesis  Prognosis: Good  History: New onset A-fib, anxiety, sleep apnea, CHF, DM, HLD, morbid obesity, and HTN  Activity Tolerance  Activity Tolerance: Patient Tolerated treatment well            Plan   Occupational Therapy Plan  Times Per Week: 3-5  Times Per Day: Once a day  Current Treatment Recommendations: Strengthening, Endurance training, Equipment evaluation, education, & procurement, Home management training, Coordination training     Restrictions  Restrictions/Precautions  Restrictions/Precautions: Fall Risk  Required Braces or Orthoses?: No  Lower Extremity Weight Bearing Restrictions  Right Lower Extremity Weight Bearing: Weight Bearing As Tolerated    Subjective   General  Chart Reviewed: Yes, Orders  Patient assessed for rehabilitation services?: Yes  Additional Pertinent Hx: New onset A-fib, anxiety, sleep apnea, CHF, DM, HLD, morbid obesity, and HTN  Response to previous treatment: Patient with no complaints from previous  session  Family / Caregiver Present: No  Diagnosis: 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 with Right hemiparesis  Back 4/10    Social/Functional History  Social/Functional History  Lives With: Friend(s)  Type of Home: House  Home Layout: One level  Home Access: Stairs to enter without rails  Entrance Stairs - Number of Steps: 2  Bathroom Shower/Tub: Tub/Shower unit  Bathroom Toilet: Standard  Bathroom Equipment: None  Home Equipment: None  Has the patient had two or more falls in the past year or any fall with injury in the past year?: No  Homemaking Assistance: Independent  Homemaking Responsibilities: Yes  Ambulation Assistance: Independent  Transfer Assistance: Independent  Active : No  Mode of Transportation: Other, Family, Friends (transit,)  Education: GED  Occupation: On disability  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 Completed various homemaking tasks using rollator, demonstrated good safety throughout.  Temp: (!) 96.6 °F (35.9 °C)  Pulse: 73  Heart Rate Source: Monitor  Respirations: 18  SpO2: 97 %  O2 Device: None (Room air)  BP: (!) 139/57  MAP (Calculated): 84  BP Location: Right upper arm     Safety Devices  Type of Devices: Call light within reach;Left in chair    Transfers  Sit to stand: Independent  Stand to sit: Independent    Exercise Treatment: FW: 2# BUE, 2 sets x 15 reps, all planes.  Motor Control/Coordination: RUE: FM pinch and placement task using light resistance clothespins, with fair (+) quality of movment. BUE: FM coordination task with fair(-) quality of movement with R hand.    Education  Education Given To:

## 2024-09-19 LAB
ANION GAP SERPL CALCULATED.3IONS-SCNC: 13 MMOL/L (ref 7–19)
BASOPHILS # BLD: 0.1 K/UL (ref 0–0.2)
BASOPHILS NFR BLD: 0.8 % (ref 0–1)
BUN SERPL-MCNC: 26 MG/DL (ref 6–20)
CALCIUM SERPL-MCNC: 8.8 MG/DL (ref 8.6–10)
CHLORIDE SERPL-SCNC: 97 MMOL/L (ref 98–111)
CO2 SERPL-SCNC: 26 MMOL/L (ref 22–29)
CREAT SERPL-MCNC: 0.8 MG/DL (ref 0.5–0.9)
EOSINOPHIL # BLD: 1.1 K/UL (ref 0–0.6)
EOSINOPHIL NFR BLD: 8.4 % (ref 0–5)
ERYTHROCYTE [DISTWIDTH] IN BLOOD BY AUTOMATED COUNT: 14.6 % (ref 11.5–14.5)
GLUCOSE BLD-MCNC: 156 MG/DL (ref 70–99)
GLUCOSE BLD-MCNC: 164 MG/DL (ref 70–99)
GLUCOSE BLD-MCNC: 177 MG/DL (ref 70–99)
GLUCOSE BLD-MCNC: 228 MG/DL (ref 70–99)
GLUCOSE SERPL-MCNC: 165 MG/DL (ref 70–99)
HCT VFR BLD AUTO: 33.9 % (ref 37–47)
HGB BLD-MCNC: 10 G/DL (ref 12–16)
IMM GRANULOCYTES # BLD: 0 K/UL
LYMPHOCYTES # BLD: 3 K/UL (ref 1.1–4.5)
LYMPHOCYTES NFR BLD: 22.3 % (ref 20–40)
MCH RBC QN AUTO: 24.2 PG (ref 27–31)
MCHC RBC AUTO-ENTMCNC: 29.5 G/DL (ref 33–37)
MCV RBC AUTO: 82.1 FL (ref 81–99)
MONOCYTES # BLD: 0.9 K/UL (ref 0–0.9)
MONOCYTES NFR BLD: 6.9 % (ref 0–10)
NEUTROPHILS # BLD: 8.1 K/UL (ref 1.5–7.5)
NEUTS SEG NFR BLD: 61.3 % (ref 50–65)
PERFORMED ON: ABNORMAL
PLATELET # BLD AUTO: 506 K/UL (ref 130–400)
PMV BLD AUTO: 10.5 FL (ref 9.4–12.3)
POTASSIUM SERPL-SCNC: 4.8 MMOL/L (ref 3.5–5)
RBC # BLD AUTO: 4.13 M/UL (ref 4.2–5.4)
SODIUM SERPL-SCNC: 136 MMOL/L (ref 136–145)
WBC # BLD AUTO: 13.2 K/UL (ref 4.8–10.8)

## 2024-09-19 PROCEDURE — 97530 THERAPEUTIC ACTIVITIES: CPT

## 2024-09-19 PROCEDURE — 94760 N-INVAS EAR/PLS OXIMETRY 1: CPT

## 2024-09-19 PROCEDURE — 97116 GAIT TRAINING THERAPY: CPT

## 2024-09-19 PROCEDURE — 82962 GLUCOSE BLOOD TEST: CPT

## 2024-09-19 PROCEDURE — 6370000000 HC RX 637 (ALT 250 FOR IP): Performed by: PSYCHIATRY & NEUROLOGY

## 2024-09-19 PROCEDURE — 80048 BASIC METABOLIC PNL TOTAL CA: CPT

## 2024-09-19 PROCEDURE — 2500000003 HC RX 250 WO HCPCS: Performed by: PSYCHIATRY & NEUROLOGY

## 2024-09-19 PROCEDURE — 6370000000 HC RX 637 (ALT 250 FOR IP): Performed by: STUDENT IN AN ORGANIZED HEALTH CARE EDUCATION/TRAINING PROGRAM

## 2024-09-19 PROCEDURE — 85025 COMPLETE CBC W/AUTO DIFF WBC: CPT

## 2024-09-19 PROCEDURE — 99232 SBSQ HOSP IP/OBS MODERATE 35: CPT | Performed by: PSYCHIATRY & NEUROLOGY

## 2024-09-19 PROCEDURE — 1180000000 HC REHAB R&B

## 2024-09-19 PROCEDURE — 36415 COLL VENOUS BLD VENIPUNCTURE: CPT

## 2024-09-19 RX ADMIN — INSULIN LISPRO 3 UNITS: 100 INJECTION, SOLUTION INTRAVENOUS; SUBCUTANEOUS at 12:07

## 2024-09-19 RX ADMIN — TIZANIDINE 4 MG: 4 TABLET ORAL at 14:04

## 2024-09-19 RX ADMIN — METOPROLOL SUCCINATE 50 MG: 50 TABLET, EXTENDED RELEASE ORAL at 08:14

## 2024-09-19 RX ADMIN — HYDROCODONE BITARTRATE AND ACETAMINOPHEN 1 TABLET: 7.5; 325 TABLET ORAL at 20:59

## 2024-09-19 RX ADMIN — TIZANIDINE 4 MG: 4 TABLET ORAL at 08:16

## 2024-09-19 RX ADMIN — INSULIN LISPRO 3 UNITS: 100 INJECTION, SOLUTION INTRAVENOUS; SUBCUTANEOUS at 17:17

## 2024-09-19 RX ADMIN — INSULIN LISPRO 3 UNITS: 100 INJECTION, SOLUTION INTRAVENOUS; SUBCUTANEOUS at 08:17

## 2024-09-19 RX ADMIN — HYDROCODONE BITARTRATE AND ACETAMINOPHEN 1 TABLET: 7.5; 325 TABLET ORAL at 14:01

## 2024-09-19 RX ADMIN — APIXABAN 5 MG: 5 TABLET, FILM COATED ORAL at 21:00

## 2024-09-19 RX ADMIN — TRAZODONE HYDROCHLORIDE 100 MG: 100 TABLET ORAL at 21:00

## 2024-09-19 RX ADMIN — GABAPENTIN 400 MG: 400 CAPSULE ORAL at 08:15

## 2024-09-19 RX ADMIN — GABAPENTIN 400 MG: 400 CAPSULE ORAL at 20:59

## 2024-09-19 RX ADMIN — BUSPIRONE HYDROCHLORIDE 5 MG: 5 TABLET ORAL at 08:15

## 2024-09-19 RX ADMIN — SENNOSIDES AND DOCUSATE SODIUM 2 TABLET: 50; 8.6 TABLET ORAL at 08:16

## 2024-09-19 RX ADMIN — ASPIRIN 81 MG CHEWABLE TABLET 81 MG: 81 TABLET CHEWABLE at 08:16

## 2024-09-19 RX ADMIN — SERTRALINE HYDROCHLORIDE 100 MG: 100 TABLET ORAL at 08:16

## 2024-09-19 RX ADMIN — INSULIN LISPRO 1 UNITS: 100 INJECTION, SOLUTION INTRAVENOUS; SUBCUTANEOUS at 12:07

## 2024-09-19 RX ADMIN — HYDROCODONE BITARTRATE AND ACETAMINOPHEN 1 TABLET: 7.5; 325 TABLET ORAL at 02:01

## 2024-09-19 RX ADMIN — DILTIAZEM HYDROCHLORIDE 300 MG: 180 CAPSULE, COATED, EXTENDED RELEASE ORAL at 08:15

## 2024-09-19 RX ADMIN — APIXABAN 5 MG: 5 TABLET, FILM COATED ORAL at 08:15

## 2024-09-19 RX ADMIN — MICONAZOLE NITRATE: 2 POWDER TOPICAL at 21:00

## 2024-09-19 RX ADMIN — MICONAZOLE NITRATE: 2 POWDER TOPICAL at 14:04

## 2024-09-19 RX ADMIN — INSULIN GLARGINE 30 UNITS: 100 INJECTION, SOLUTION SUBCUTANEOUS at 08:17

## 2024-09-19 RX ADMIN — HYDROCODONE BITARTRATE AND ACETAMINOPHEN 1 TABLET: 7.5; 325 TABLET ORAL at 08:15

## 2024-09-19 RX ADMIN — TIZANIDINE 4 MG: 4 TABLET ORAL at 21:00

## 2024-09-19 RX ADMIN — FUROSEMIDE 20 MG: 20 TABLET ORAL at 08:15

## 2024-09-19 RX ADMIN — ATORVASTATIN CALCIUM 40 MG: 40 TABLET, FILM COATED ORAL at 21:00

## 2024-09-19 RX ADMIN — GABAPENTIN 400 MG: 400 CAPSULE ORAL at 14:04

## 2024-09-19 RX ADMIN — Medication 400 MG: at 21:00

## 2024-09-19 ASSESSMENT — PAIN SCALES - GENERAL
PAINLEVEL_OUTOF10: 0
PAINLEVEL_OUTOF10: 3
PAINLEVEL_OUTOF10: 5
PAINLEVEL_OUTOF10: 0
PAINLEVEL_OUTOF10: 2
PAINLEVEL_OUTOF10: 6

## 2024-09-19 ASSESSMENT — PAIN DESCRIPTION - DESCRIPTORS
DESCRIPTORS: ACHING
DESCRIPTORS: ACHING;DISCOMFORT

## 2024-09-19 ASSESSMENT — PAIN DESCRIPTION - ORIENTATION
ORIENTATION: RIGHT

## 2024-09-19 ASSESSMENT — PAIN - FUNCTIONAL ASSESSMENT
PAIN_FUNCTIONAL_ASSESSMENT: ACTIVITIES ARE NOT PREVENTED

## 2024-09-19 ASSESSMENT — PAIN DESCRIPTION - LOCATION
LOCATION: HIP
LOCATION: HIP;BACK

## 2024-09-19 NOTE — PROGRESS NOTES
Physical Therapy     09/19/24 0814   Restrictions/Precautions   Restrictions/Precautions Fall Risk   Required Braces or Orthoses? No   Lower Extremity Weight Bearing Restrictions   Right Lower Extremity Weight Bearing Weight Bearing As Tolerated   General   Additional Pertinent Hx pneumonia, lipoprotein metabolism disorder unspecified, DM 2, anxiety disorder, obesity, obstructive sleep apnea, heart failure   Diagnosis acute ischemic stroke, R side weakness, dysarthria, leukocytosis, PNA   Subjective   Subjective Pt. agreeable to therapy session.   Vitals   Respirations 18   Subjective   Pain low back radiating down R hip 6/10   Bed mobility   Bed Mobility Comments in recliner   Transfers   Sit to Stand Modified independent   Stand to Sit Modified independent   Bed to Chair Modified independent   Comment 10 consecutive STS from recliner <> rollator with no LOB or sign of distress- slight fatigue after rep 6.   Ambulation   Surface Level tile   Device Rollator   Assistance Supervision   Quality of Gait Steady NO LOB   Gait Deviations Decreased step height;Decreased step length;Deviated path   Distance 150'   Comments with turns and one sitting rest break due to pain and practice of rollator safety.   Ambulation 2   Surface - 2 level tile   Device 2 Rollator   Assistance 2 Supervision   Quality of Gait 2 as above   Gait Deviations Decreased step height;Decreased arm swing;Decreased head and trunk rotation;Increased GURWINDER   Distance 100'   Comments with 180 degree turns, posterior steps, and tight L/R turns in narrow space with no LOB   Ambulation 3   Surface - 3 level tile   Device 3 Rollator   Assistance 3 Supervision   Quality of Gait 3 same as above   Gait Deviations Slow Mae;Decreased step length;Decreased step height   Distance 150'   Comments increased antalgic gait noted- one sitting rest break in hallway due to pain in R hip   Stairs   # Steps  7   Stairs Height 6\"   Rails Bilateral   Assistance Supervision

## 2024-09-19 NOTE — PROGRESS NOTES
Occupational Therapy  Facility/Department: [facility] 8 REHAB UNIT  Rehabilitation Occupational Therapy Daily Treatment Note    Date: 24  Patient Name: Mary Lira       Room: 0817/817-02  MRN: [id number]  Account: [id number]   : 1975  (48 y.o.) Gender: female            Past Medical History:  has a past medical history of Anxiety, Apnea, CHF (congestive heart failure) (HCC), Diabetes mellitus (HCC), Hyperlipidemia, Hypertension, and Palliative care patient.  Past Surgical History:   has a past surgical history that includes Appendectomy and Carotid endarterectomy (Left, 2024).     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   ADL   Additional Comments see CARE scores   Balance   Standing Balance Independent   Standing Balance   Activity ADLs   Functional Mobility   Functional - Mobility Device Rolling Walker   Assist Level Supervision  (/Mod I)   Transfers   Sit to stand Independent   Stand to sit Independent   Toilet Transfers   Toilet - Technique Ambulating   Toilet Transfer Supervision  (/Mod I)   Shower Transfers   Shower - Transfer From Other   Shower - Transfer Type To and From   Shower - Transfer To Transfer tub bench   Shower - Technique Ambulating   Shower Transfers Stand by assistance   Short Term Goals   Short Term Goal 5 MET   Activity Tolerance   Activity Tolerance Patient Tolerated treatment well   Assessment   Performance deficits / Impairments Decreased functional mobility ;Decreased ADL status;Decreased ROM;Decreased strength;Decreased high-level IADLs;Decreased endurance   Assessment Pt did well with morning routine. Consistently safe and no balance issues. Rec up ad emil.   Treatment

## 2024-09-19 NOTE — PROGRESS NOTES
patient will complete 1-2 handed standing task for 5 mins with supervision  Short Term Goal 6: patient will complete HEP x 5 occasions to improve strength and endurance for ADLs     LTGs:  Long Term Goals  Time Frame for Long Term Goals : 2 weeks  Long Term Goal 1: patient will complete overall dressing with modified independence  Long Term Goal 2: patient will complete overall toileting with modified independence  Long Term Goal 3: patient will complete overall bathing with modified independence  Long Term Goal 4: patient will complete HEP with independence  Long Term Goal 5: patient will complete ambulatory home making task with modified independence     Assessment:  Performance deficits / Impairments: Decreased functional mobility , Decreased ADL status, Decreased ROM, Decreased strength, Decreased balance, Decreased high-level IADLs, Decreased endurance, Decreased coordination                        NUTRITION  Current Wt: Weight - Scale: 115.7 kg (255 lb) / Body mass index is 45.17 kg/m².  Admission Wt: Admission Body Weight: 118 kg (260 lb 2.3 oz)  Oral Diet Orders:   Regular; 5 carb choices   Oral Nutrition Supplement (ONS) Orders:  None  Pt remains adequately nourished. PO intake avg >75% of all meals. Wt is stable. BG improving, ranging 146-231mg/dL over the past 48 hrs. No nutrition-related needs identified at this time. Continue current POC. Please see nutrition note for details.         RECORD REVIEW: Previous medical records, medications were reviewed at today's visit    IMPRESSION:     1.  Left hemisphere stroke status post left carotid endarterectomy-aspirin/statin-PT/OT/SLP  2.  New onset A-fib/DVT prophylaxis-Eliquis, diltiazem, metoprolol  3.  Anxiety-BuSpar, trazodone at night.  Zoloft  4.  Hyperlipidemia-Lipitor  5.  CHF-Lasix  6.  Diabetes-insulin and monitoring  7.  GI-bowel regimen  Continue Lidoderm and tizanidine for sciatica.  Continue gabapentin for sciatica.  Not helping but has a history  of peripheral edema and hate to increase it.  negative lumbar x-ray  Mag oxide helping leg cramps. Has longstanding peeling of right sole. Suggest her to see derm as opt.    ELOS:9/26 , but TBD

## 2024-09-19 NOTE — PROGRESS NOTES
Individual Minutes   Time In 1430   Time Out 1515   Minutes 45     Electronically signed by Zuhair Weiss PTA on 9/19/2024 at 3:10 PM

## 2024-09-20 LAB
GLUCOSE BLD-MCNC: 147 MG/DL (ref 70–99)
GLUCOSE BLD-MCNC: 168 MG/DL (ref 70–99)
GLUCOSE BLD-MCNC: 210 MG/DL (ref 70–99)
GLUCOSE BLD-MCNC: 228 MG/DL (ref 70–99)
PERFORMED ON: ABNORMAL

## 2024-09-20 PROCEDURE — 1180000000 HC REHAB R&B

## 2024-09-20 PROCEDURE — 97110 THERAPEUTIC EXERCISES: CPT

## 2024-09-20 PROCEDURE — 97116 GAIT TRAINING THERAPY: CPT

## 2024-09-20 PROCEDURE — 97530 THERAPEUTIC ACTIVITIES: CPT

## 2024-09-20 PROCEDURE — 6370000000 HC RX 637 (ALT 250 FOR IP): Performed by: PSYCHIATRY & NEUROLOGY

## 2024-09-20 PROCEDURE — 6370000000 HC RX 637 (ALT 250 FOR IP): Performed by: STUDENT IN AN ORGANIZED HEALTH CARE EDUCATION/TRAINING PROGRAM

## 2024-09-20 PROCEDURE — 82962 GLUCOSE BLOOD TEST: CPT

## 2024-09-20 RX ADMIN — HYDROCODONE BITARTRATE AND ACETAMINOPHEN 1 TABLET: 7.5; 325 TABLET ORAL at 21:10

## 2024-09-20 RX ADMIN — INSULIN LISPRO 3 UNITS: 100 INJECTION, SOLUTION INTRAVENOUS; SUBCUTANEOUS at 08:26

## 2024-09-20 RX ADMIN — TIZANIDINE 4 MG: 4 TABLET ORAL at 21:07

## 2024-09-20 RX ADMIN — INSULIN GLARGINE 30 UNITS: 100 INJECTION, SOLUTION SUBCUTANEOUS at 08:26

## 2024-09-20 RX ADMIN — METOPROLOL SUCCINATE 50 MG: 50 TABLET, EXTENDED RELEASE ORAL at 08:25

## 2024-09-20 RX ADMIN — Medication 400 MG: at 21:07

## 2024-09-20 RX ADMIN — INSULIN LISPRO 3 UNITS: 100 INJECTION, SOLUTION INTRAVENOUS; SUBCUTANEOUS at 12:22

## 2024-09-20 RX ADMIN — INSULIN LISPRO 3 UNITS: 100 INJECTION, SOLUTION INTRAVENOUS; SUBCUTANEOUS at 17:44

## 2024-09-20 RX ADMIN — ASPIRIN 81 MG CHEWABLE TABLET 81 MG: 81 TABLET CHEWABLE at 08:24

## 2024-09-20 RX ADMIN — APIXABAN 5 MG: 5 TABLET, FILM COATED ORAL at 21:07

## 2024-09-20 RX ADMIN — GABAPENTIN 400 MG: 400 CAPSULE ORAL at 21:07

## 2024-09-20 RX ADMIN — HYDROCODONE BITARTRATE AND ACETAMINOPHEN 1 TABLET: 7.5; 325 TABLET ORAL at 02:57

## 2024-09-20 RX ADMIN — SERTRALINE HYDROCHLORIDE 100 MG: 100 TABLET ORAL at 08:25

## 2024-09-20 RX ADMIN — INSULIN LISPRO 1 UNITS: 100 INJECTION, SOLUTION INTRAVENOUS; SUBCUTANEOUS at 12:22

## 2024-09-20 RX ADMIN — APIXABAN 5 MG: 5 TABLET, FILM COATED ORAL at 08:25

## 2024-09-20 RX ADMIN — HYDROCODONE BITARTRATE AND ACETAMINOPHEN 1 TABLET: 7.5; 325 TABLET ORAL at 08:26

## 2024-09-20 RX ADMIN — BUSPIRONE HYDROCHLORIDE 5 MG: 5 TABLET ORAL at 08:24

## 2024-09-20 RX ADMIN — DILTIAZEM HYDROCHLORIDE 300 MG: 180 CAPSULE, COATED, EXTENDED RELEASE ORAL at 08:24

## 2024-09-20 RX ADMIN — GABAPENTIN 400 MG: 400 CAPSULE ORAL at 14:34

## 2024-09-20 RX ADMIN — MICONAZOLE NITRATE: 2 POWDER TOPICAL at 08:30

## 2024-09-20 RX ADMIN — FUROSEMIDE 20 MG: 20 TABLET ORAL at 08:24

## 2024-09-20 RX ADMIN — HYDROCODONE BITARTRATE AND ACETAMINOPHEN 1 TABLET: 7.5; 325 TABLET ORAL at 14:34

## 2024-09-20 RX ADMIN — ATORVASTATIN CALCIUM 40 MG: 40 TABLET, FILM COATED ORAL at 21:07

## 2024-09-20 RX ADMIN — SENNOSIDES AND DOCUSATE SODIUM 2 TABLET: 50; 8.6 TABLET ORAL at 08:24

## 2024-09-20 RX ADMIN — GABAPENTIN 400 MG: 400 CAPSULE ORAL at 08:24

## 2024-09-20 RX ADMIN — TRAZODONE HYDROCHLORIDE 100 MG: 100 TABLET ORAL at 21:07

## 2024-09-20 RX ADMIN — MICONAZOLE NITRATE: 2 POWDER TOPICAL at 21:11

## 2024-09-20 RX ADMIN — TIZANIDINE 4 MG: 4 TABLET ORAL at 08:24

## 2024-09-20 RX ADMIN — TIZANIDINE 4 MG: 4 TABLET ORAL at 14:34

## 2024-09-20 ASSESSMENT — PAIN DESCRIPTION - LOCATION
LOCATION: HIP

## 2024-09-20 ASSESSMENT — PAIN - FUNCTIONAL ASSESSMENT
PAIN_FUNCTIONAL_ASSESSMENT: ACTIVITIES ARE NOT PREVENTED

## 2024-09-20 ASSESSMENT — PAIN DESCRIPTION - ORIENTATION
ORIENTATION: RIGHT

## 2024-09-20 ASSESSMENT — PAIN SCALES - GENERAL
PAINLEVEL_OUTOF10: 8
PAINLEVEL_OUTOF10: 5
PAINLEVEL_OUTOF10: 0
PAINLEVEL_OUTOF10: 6
PAINLEVEL_OUTOF10: 0
PAINLEVEL_OUTOF10: 5
PAINLEVEL_OUTOF10: 7

## 2024-09-20 ASSESSMENT — PAIN DESCRIPTION - PAIN TYPE: TYPE: ACUTE PAIN

## 2024-09-20 ASSESSMENT — PAIN DESCRIPTION - DESCRIPTORS
DESCRIPTORS: ACHING
DESCRIPTORS: ACHING

## 2024-09-20 NOTE — PROGRESS NOTES
Physical Therapy  Name: Mary Lira  MRN:  [id number]  Date of service:  9/20/2024 09/20/24 0815   Restrictions/Precautions   Restrictions/Precautions Fall Risk   Lower Extremity Weight Bearing Restrictions   Right Lower Extremity Weight Bearing Weight Bearing As Tolerated   General   Additional Pertinent Hx pneumonia, lipoprotein metabolism disorder unspecified, DM 2, anxiety disorder, obesity, obstructive sleep apnea, heart failure   Diagnosis acute ischemic stroke, R side weakness, dysarthria, leukocytosis, PNA   General Comment   Comments Pt in recliner   Subjective   Subjective Pt. agreeable to therapy session states her pain has been elevated but meds coming. Okay with session outside.   Subjective   Subjective R back and hip   Pain 6-7/10   Transfers   Sit to Stand Modified independent   Stand to Sit Modified independent   Bed to Chair Modified independent   Comment pt hard on herself in instances of forgetting to reach back for chair, but occasional and controlled   Ambulation   Surface Level tile   Device Rollator   Assistance Supervision   Quality of Gait Steady NO LOB   Gait Deviations Decreased step length;Decreased step height   Distance 150   Comments incorporated turns no LOB   Ambulation 2   Surface - 2 uneven;outdoors;ramp  (various outdoor surfaces including up/down hill and pavers/sidewalks with irregularities in surface)   Device 2 Rollator   Assistance 2 Supervision   Quality of Gait 2 as above with slower pace in downhill scenario, properly using rollator brakes to control descent   Distance 150' and 200'   PT Exercises   Resistive Exercises man resist x 20: laq, hs curls, hip abd, hip flex, hip ext, hip add   Assessment   Assessment Pt doing well with rollator though she voices some dec confidence. No LOB or instability noted on in/outdoor surfaces and good maangement of brakes. Pt with one instance of forgetting to reach back for chair prior to sitting down, but controlled landing. Cont  pain R LB and hip.   Safety Devices   Type of Devices   (to OT post session)   PT Individual Minutes   Time In 0820   Time Out 0905   Minutes 45         Electronically signed by Morelia Chakraborty PTA on 9/20/2024 at 12:07 PM

## 2024-09-20 NOTE — PLAN OF CARE
Problem: Safety - Adult  Goal: Free from fall injury  9/19/2024 1922 by Med Ruvalcaba, RN  Outcome: Progressing  9/19/2024 1914 by Jackie Berman RN  Outcome: Progressing  9/19/2024 1240 by Latoya Griffin, RN  Outcome: Progressing

## 2024-09-20 NOTE — PROGRESS NOTES
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  Past Surgical History:   has a past surgical history that includes Appendectomy and Carotid endarterectomy (Left, 2024).     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 States she walked a lot yesterday with the Rollator and had trouble sleeping last night d/t pain. Provided with ice pack.   Subjective   Subjective R back and hip   Pain 6-7/10   ADL   Additional Comments gave herself a shower this morning without issues   Balance   Standing Balance Independent   Functional Mobility   Functional - Mobility Device Rolling Walker   Activity Other   Assist Level Modified independent    Functional Mobility Comments in room   Transfers   Sit to stand Independent   Stand to sit Independent   OT Exercises   Exercise Treatment 2# Tbar   Resistive Exercises B hand  exerciser   Short Term Goals   Short Term Goal 6 MET   Activity Tolerance   Activity

## 2024-09-20 NOTE — PROGRESS NOTES
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  Past Surgical History:   has a past surgical history that includes Appendectomy and Carotid endarterectomy (Left, 2024).     24 1300   Restrictions/Precautions   Restrictions/Precautions Up Ad Trista   General   Additional Pertinent Hx New onset A-fib, anxiety, sleep apnea, CHF, DM, HLD, morbid obesity, and HTN   Diagnosis 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 with Right hemiparesis   Balance   Standing Balance Independent   Functional Mobility   Functional - Mobility Device Rolling Walker   Assist Level Modified independent    Toilet Transfers   Toilet - Technique Ambulating   Toilet Transfer Modified independent   OT Exercises   Exercise Treatment 2# FW HEP   Activity Tolerance   Activity Tolerance Patient Tolerated treatment well   Assessment   Performance deficits / Impairments Decreased functional mobility ;Decreased ROM;Decreased strength;Decreased high-level IADLs;Decreased endurance   Treatment Diagnosis 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 with Right hemiparesis   Time Code Minutes    Timed Code Treatment Minutes 45 Minutes   OT Individual Minutes   Time In 1300   Time Out 1345   Minutes 45

## 2024-09-20 NOTE — PROGRESS NOTES
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  Past Surgical History:   has a past surgical history that includes Appendectomy and Carotid endarterectomy (Left, 2024).     24 0700   Restrictions/Precautions   Restrictions/Precautions Up Ad Trista   General   Additional Pertinent Hx New onset A-fib, anxiety, sleep apnea, CHF, DM, HLD, morbid obesity, and HTN   Diagnosis 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 with Right hemiparesis   ADL   Additional Comments Ind with ADLs   Functional Mobility   Functional - Mobility Device 4-Wheeled Walker   Activity To/From therapy gym;To/from bathroom;Retrieve items;Transport items   Assist Level Modified independent    Transfers   Sit to stand Independent   Stand to sit Independent   Toilet Transfers   Toilet - Technique Ambulating   Equipment Used Standard bedside commode  (over toilet)   Toilet Transfer Modified independent   Toilet Transfers Comments rec BSC over toilet at D/C   Tub Transfers   Tub - Transfer From Rolling walker   Tub - Transfer To   (TTB and shower seat)   Tub - Technique Ambulating   Tub Transfers Comments trialed with shower seat and TTB- rec TTB as pt had moderate difficulty clearing tub with foot. Will not have a GB where she is  staying. Utilized Rollator and wall for stability.   OT Exercises   Exercise Treatment 2# Tbar   Activity Tolerance   Activity Tolerance Patient Tolerated treatment well   Assessment   Performance deficits / Impairments Decreased functional mobility ;Decreased ROM;Decreased strength;Decreased high-level IADLs;Decreased endurance   Assessment DME recs- TTB, BSC, and Rollator   Treatment Diagnosis 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 with Right hemiparesis   Time Code Minutes    Timed Code Treatment Minutes 53 Minutes   OT Individual Minutes   Time In 0700   Time Out 0753   Minutes 53

## 2024-09-20 NOTE — PROGRESS NOTES
Mary Lira  [id number]       09/20/24 1435 09/20/24 1442 09/20/24 1445   Restrictions/Precautions   Restrictions/Precautions  --   --   --    Lower Extremity Weight Bearing Restrictions   Right Lower Extremity Weight Bearing  --   --   --    General   Additional Pertinent Hx  --   --   --    Diagnosis  --   --   --    Subjective   Subjective  --   --   --    Vitals   Temp 97.2 °F (36.2 °C)  --   --    Pulse 57  --   --    Respirations 16  --   --    SpO2 99 %  --   --    BP (!) 133/59  --   --    MAP (Calculated) 84  --   --    Transfers   Sit to Stand  --  Modified independent  --    Stand to Sit  --  Modified independent  --    Lateral Transfers  --  Modified independent  (Recliner to/from W/C.)  --    Ambulation   Surface  --   --  Level tile   Device  --   --  Rolling Walker   Assistance  --   --  Stand by assistance;Independent   Quality of Gait  --   --  Steady NO LOB   Gait Deviations  --   --  Decreased step length;Decreased step height   Distance  --   --  150' x2   Comments  --   --  incorporated turns no LOB   PT Exercises   Exercise Treatment  --   --   --    Activity Tolerance   Activity Tolerance  --   --   --    Assessment   Assessment  --   --   --    Safety Devices   Type of Devices  --   --   --    PT Individual Minutes   Time In  --   --   --    Time Out  --   --   --    Minutes  --   --   --       09/20/24 1458 09/20/24 1500 09/20/24 1501   Restrictions/Precautions   Restrictions/Precautions  --   --   --    Lower Extremity Weight Bearing Restrictions   Right Lower Extremity Weight Bearing  --   --   --    General   Additional Pertinent Hx  --   --   --    Diagnosis  --   --   --    Subjective   Subjective  --   --   --    Vitals   Temp  --   --   --    Pulse  --   --   --    Respirations  --   --   --    SpO2  --   --   --    BP  --   --   --    MAP (Calculated)  --   --   --    Transfers   Sit to Stand  --   --   --    Stand to Sit  --   --   --    Lateral Transfers  --   --   --     Ambulation   Surface  --   --   --    Device  --   --   --    Assistance  --   --   --    Quality of Gait  --   --   --    Gait Deviations  --   --   --    Distance  --   --   --    Comments  --   --   --    PT Exercises   Exercise Treatment Sitting BLE exercises  x15 reps with 1-1/2 lb weights.  --   --    Activity Tolerance   Activity Tolerance  --   --  Patient tolerated treatment well   Assessment   Assessment  --   --  Pt. states she likes the support of using RW vs Rollator when she is hurting.  Tolerated session well.   Safety Devices   Type of Devices  --   --   --    PT Individual Minutes   Time In  --  1430  --    Time Out  --  1515  --    Minutes  --  45  --       09/20/24 1505 09/20/24 1523   Restrictions/Precautions   Restrictions/Precautions  --  Up Ad Trista   Lower Extremity Weight Bearing Restrictions   Right Lower Extremity Weight Bearing  --  Weight Bearing As Tolerated   General   Additional Pertinent Hx  --  pneumonia, lipoprotein metabolism disorder unspecified, DM 2, anxiety disorder, obesity, obstructive sleep apnea, heart failure   Diagnosis  --  acute ischemic stroke, R side weakness, dysarthria, leukocytosis, PNA   Subjective   Subjective  --  Pt. agreeable to therapy   Vitals   Temp  --   --    Pulse  --   --    Respirations  --   --    SpO2  --   --    BP  --   --    MAP (Calculated)  --   --    Transfers   Sit to Stand  --   --    Stand to Sit  --   --    Lateral Transfers  --   --    Ambulation   Surface  --   --    Device  --   --    Assistance  --   --    Quality of Gait  --   --    Gait Deviations  --   --    Distance  --   --    Comments  --   --    PT Exercises   Exercise Treatment  --   --    Activity Tolerance   Activity Tolerance  --   --    Assessment   Assessment  --   --    Safety Devices   Type of Devices Call light within reach  --    PT Individual Minutes   Time In  --   --    Time Out  --   --    Minutes  --   --      Electronically signed by Gwendolyn Nance PTA on

## 2024-09-20 NOTE — PROGRESS NOTES
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  Past Surgical History:   has a past surgical history that includes Appendectomy and Carotid endarterectomy (Left, 2024).     24 1300   Restrictions/Precautions   Restrictions/Precautions Up Ad Trista   General   Additional Pertinent Hx New onset A-fib, anxiety, sleep apnea, CHF, DM, HLD, morbid obesity, and HTN   Diagnosis 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 with Right hemiparesis   Balance   Standing Balance Independent   Standing Balance   Time 6 mins x 2 occ   Activity R UE act   Functional Mobility   Functional - Mobility Device Rolling Walker   Activity Other   Assist Level Modified independent    Functional Mobility Comments in room and OT gym   OT Exercises   Exercise Treatment 12# Rickshaw   Dynamic Standing Balance Exercises Min to Mod dynamic standing with Wii   Activity Tolerance   Activity Tolerance Patient Tolerated treatment well   Assessment   Performance deficits / Impairments Decreased functional mobility ;Decreased strength;Decreased high-level IADLs;Decreased endurance   Treatment Diagnosis 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 with Right hemiparesis   Time Code Minutes    Timed Code Treatment Minutes

## 2024-09-20 NOTE — PLAN OF CARE
Problem: Safety - Adult  Goal: Free from fall injury  9/19/2024 1914 by Jackie Berman RN  Outcome: Progressing  9/19/2024 1240 by Latoya Griffin RN  Outcome: Progressing     Problem: Skin/Tissue Integrity  Goal: Absence of new skin breakdown  Description: 1.  Monitor for areas of redness and/or skin breakdown  2.  Assess vascular access sites hourly  3.  Every 4-6 hours minimum:  Change oxygen saturation probe site  4.  Every 4-6 hours:  If on nasal continuous positive airway pressure, respiratory therapy assess nares and determine need for appliance change or resting period.  9/19/2024 1914 by Jackie Berman RN  Outcome: Progressing  9/19/2024 1240 by Latoya Griffin RN  Outcome: Progressing     Problem: ABCDS Injury Assessment  Goal: Absence of physical injury  9/19/2024 1914 by Jackie Berman RN  Outcome: Progressing  9/19/2024 1240 by Latoya Griffin RN  Outcome: Progressing     Problem: Discharge Planning  Goal: Discharge to home or other facility with appropriate resources  9/19/2024 1914 by Jackie Berman RN  Outcome: Progressing  Flowsheets (Taken 9/19/2024 1907)  Discharge to home or other facility with appropriate resources:   Identify barriers to discharge with patient and caregiver   Refer to discharge planning if patient needs post-hospital services based on physician order or complex needs related to functional status, cognitive ability or social support system  9/19/2024 1240 by Latoya Griffin RN  Outcome: Progressing     Problem: Pain  Goal: Verbalizes/displays adequate comfort level or baseline comfort level  9/19/2024 1914 by Jackie Berman RN  Outcome: Progressing  9/19/2024 1240 by Latoya Griffin RN  Outcome: Progressing     Problem: Chronic Conditions and Co-morbidities  Goal: Patient's chronic conditions and co-morbidity symptoms are monitored and maintained or improved  9/19/2024 1914 by Jackie Berman RN  Outcome: Progressing  Flowsheets (Taken 9/19/2024 1907)  Care Plan - Patient's

## 2024-09-21 LAB
GLUCOSE BLD-MCNC: 157 MG/DL (ref 70–99)
GLUCOSE BLD-MCNC: 168 MG/DL (ref 70–99)
GLUCOSE BLD-MCNC: 175 MG/DL (ref 70–99)
GLUCOSE BLD-MCNC: 242 MG/DL (ref 70–99)
PERFORMED ON: ABNORMAL

## 2024-09-21 PROCEDURE — 6370000000 HC RX 637 (ALT 250 FOR IP): Performed by: PSYCHIATRY & NEUROLOGY

## 2024-09-21 PROCEDURE — 82962 GLUCOSE BLOOD TEST: CPT

## 2024-09-21 PROCEDURE — 6370000000 HC RX 637 (ALT 250 FOR IP): Performed by: STUDENT IN AN ORGANIZED HEALTH CARE EDUCATION/TRAINING PROGRAM

## 2024-09-21 PROCEDURE — 1180000000 HC REHAB R&B

## 2024-09-21 PROCEDURE — 99232 SBSQ HOSP IP/OBS MODERATE 35: CPT | Performed by: PSYCHIATRY & NEUROLOGY

## 2024-09-21 RX ADMIN — INSULIN LISPRO 3 UNITS: 100 INJECTION, SOLUTION INTRAVENOUS; SUBCUTANEOUS at 08:42

## 2024-09-21 RX ADMIN — HYDROCODONE BITARTRATE AND ACETAMINOPHEN 1 TABLET: 7.5; 325 TABLET ORAL at 14:23

## 2024-09-21 RX ADMIN — DILTIAZEM HYDROCHLORIDE 300 MG: 180 CAPSULE, COATED, EXTENDED RELEASE ORAL at 08:35

## 2024-09-21 RX ADMIN — GABAPENTIN 400 MG: 400 CAPSULE ORAL at 08:35

## 2024-09-21 RX ADMIN — HYDROCODONE BITARTRATE AND ACETAMINOPHEN 1 TABLET: 7.5; 325 TABLET ORAL at 03:34

## 2024-09-21 RX ADMIN — INSULIN GLARGINE 30 UNITS: 100 INJECTION, SOLUTION SUBCUTANEOUS at 08:36

## 2024-09-21 RX ADMIN — SENNOSIDES AND DOCUSATE SODIUM 2 TABLET: 50; 8.6 TABLET ORAL at 08:34

## 2024-09-21 RX ADMIN — ASPIRIN 81 MG CHEWABLE TABLET 81 MG: 81 TABLET CHEWABLE at 08:35

## 2024-09-21 RX ADMIN — TRAZODONE HYDROCHLORIDE 100 MG: 100 TABLET ORAL at 20:17

## 2024-09-21 RX ADMIN — ATORVASTATIN CALCIUM 40 MG: 40 TABLET, FILM COATED ORAL at 20:17

## 2024-09-21 RX ADMIN — GABAPENTIN 400 MG: 400 CAPSULE ORAL at 20:17

## 2024-09-21 RX ADMIN — INSULIN LISPRO 3 UNITS: 100 INJECTION, SOLUTION INTRAVENOUS; SUBCUTANEOUS at 13:36

## 2024-09-21 RX ADMIN — APIXABAN 5 MG: 5 TABLET, FILM COATED ORAL at 20:17

## 2024-09-21 RX ADMIN — BUSPIRONE HYDROCHLORIDE 5 MG: 5 TABLET ORAL at 08:35

## 2024-09-21 RX ADMIN — HYDROCODONE BITARTRATE AND ACETAMINOPHEN 1 TABLET: 7.5; 325 TABLET ORAL at 08:34

## 2024-09-21 RX ADMIN — METOPROLOL SUCCINATE 50 MG: 50 TABLET, EXTENDED RELEASE ORAL at 08:35

## 2024-09-21 RX ADMIN — MICONAZOLE NITRATE: 2 POWDER TOPICAL at 20:18

## 2024-09-21 RX ADMIN — MICONAZOLE NITRATE: 2 POWDER TOPICAL at 08:46

## 2024-09-21 RX ADMIN — HYDROCODONE BITARTRATE AND ACETAMINOPHEN 1 TABLET: 7.5; 325 TABLET ORAL at 20:17

## 2024-09-21 RX ADMIN — SERTRALINE HYDROCHLORIDE 100 MG: 100 TABLET ORAL at 08:35

## 2024-09-21 RX ADMIN — GABAPENTIN 400 MG: 400 CAPSULE ORAL at 13:36

## 2024-09-21 RX ADMIN — APIXABAN 5 MG: 5 TABLET, FILM COATED ORAL at 08:35

## 2024-09-21 RX ADMIN — TIZANIDINE 4 MG: 4 TABLET ORAL at 20:17

## 2024-09-21 RX ADMIN — INSULIN LISPRO 3 UNITS: 100 INJECTION, SOLUTION INTRAVENOUS; SUBCUTANEOUS at 17:41

## 2024-09-21 RX ADMIN — TIZANIDINE 4 MG: 4 TABLET ORAL at 13:36

## 2024-09-21 RX ADMIN — TIZANIDINE 4 MG: 4 TABLET ORAL at 08:35

## 2024-09-21 RX ADMIN — FUROSEMIDE 20 MG: 20 TABLET ORAL at 08:35

## 2024-09-21 RX ADMIN — INSULIN LISPRO 1 UNITS: 100 INJECTION, SOLUTION INTRAVENOUS; SUBCUTANEOUS at 13:37

## 2024-09-21 RX ADMIN — Medication 400 MG: at 20:17

## 2024-09-21 ASSESSMENT — PAIN DESCRIPTION - LOCATION
LOCATION: BACK
LOCATION: HIP
LOCATION: BACK
LOCATION: BACK;HIP

## 2024-09-21 ASSESSMENT — PAIN DESCRIPTION - ORIENTATION: ORIENTATION: RIGHT

## 2024-09-21 ASSESSMENT — PAIN SCALES - GENERAL
PAINLEVEL_OUTOF10: 6
PAINLEVEL_OUTOF10: 5
PAINLEVEL_OUTOF10: 0
PAINLEVEL_OUTOF10: 0
PAINLEVEL_OUTOF10: 5
PAINLEVEL_OUTOF10: 6

## 2024-09-21 ASSESSMENT — PAIN DESCRIPTION - DESCRIPTORS
DESCRIPTORS: ACHING
DESCRIPTORS: ACHING

## 2024-09-21 ASSESSMENT — PAIN - FUNCTIONAL ASSESSMENT: PAIN_FUNCTIONAL_ASSESSMENT: ACTIVITIES ARE NOT PREVENTED

## 2024-09-21 ASSESSMENT — PAIN DESCRIPTION - PAIN TYPE: TYPE: ACUTE PAIN

## 2024-09-21 NOTE — PLAN OF CARE
Problem: Safety - Adult  Goal: Free from fall injury  9/20/2024 1933 by Med Ruvalcaba, RN  Outcome: Progressing  9/20/2024 1149 by Latoya Griffin, RN  Outcome: Progressing

## 2024-09-21 NOTE — PLAN OF CARE
Problem: Safety - Adult  Goal: Free from fall injury  Outcome: Progressing     Problem: Skin/Tissue Integrity  Goal: Absence of new skin breakdown  Description: 1.  Monitor for areas of redness and/or skin breakdown  2.  Assess vascular access sites hourly  3.  Every 4-6 hours minimum:  Change oxygen saturation probe site  4.  Every 4-6 hours:  If on nasal continuous positive airway pressure, respiratory therapy assess nares and determine need for appliance change or resting period.  Outcome: Progressing     Problem: ABCDS Injury Assessment  Goal: Absence of physical injury  Outcome: Progressing     Problem: Discharge Planning  Goal: Discharge to home or other facility with appropriate resources  Outcome: Progressing     Problem: Pain  Goal: Verbalizes/displays adequate comfort level or baseline comfort level  Outcome: Progressing     Problem: Chronic Conditions and Co-morbidities  Goal: Patient's chronic conditions and co-morbidity symptoms are monitored and maintained or improved  Outcome: Progressing     Problem: Neurosensory - Adult  Goal: Achieves stable or improved neurological status  Outcome: Progressing  Goal: Achieves maximal functionality and self care  Outcome: Progressing     Problem: Respiratory - Adult  Goal: Achieves optimal ventilation and oxygenation  Outcome: Progressing     Problem: Cardiovascular - Adult  Goal: Maintains optimal cardiac output and hemodynamic stability  Outcome: Progressing     Problem: Skin/Tissue Integrity - Adult  Goal: Skin integrity remains intact  Outcome: Progressing  Goal: Incisions, wounds, or drain sites healing without S/S of infection  Outcome: Progressing  Goal: Oral mucous membranes remain intact  Outcome: Progressing     Problem: Musculoskeletal - Adult  Goal: Return mobility to safest level of function  Outcome: Progressing  Goal: Maintain proper alignment of affected body part  Outcome: Progressing  Goal: Return ADL status to a safe level of function  Outcome:  Progressing     Problem: Gastrointestinal - Adult  Goal: Minimal or absence of nausea and vomiting  Outcome: Progressing  Goal: Maintains or returns to baseline bowel function  Outcome: Progressing  Goal: Maintains adequate nutritional intake  Outcome: Progressing     Problem: Genitourinary - Adult  Goal: Absence of urinary retention  Outcome: Progressing     Problem: Infection - Adult  Goal: Absence of infection at discharge  Outcome: Progressing     Problem: Metabolic/Fluid and Electrolytes - Adult  Goal: Electrolytes maintained within normal limits  Outcome: Progressing  Goal: Hemodynamic stability and optimal renal function maintained  Outcome: Progressing  Goal: Glucose maintained within prescribed range  Outcome: Progressing     Problem: Hematologic - Adult  Goal: Maintains hematologic stability  Outcome: Progressing     Problem: Anxiety  Goal: Will report anxiety at manageable levels  Description: INTERVENTIONS:  1. Administer medication as ordered  2. Teach and rehearse alternative coping skills  3. Provide emotional support with 1:1 interaction with staff  Outcome: Progressing     Problem: Coping  Goal: Pt/Family able to verbalize concerns and demonstrate effective coping strategies  Description: INTERVENTIONS:  1. Assist patient/family to identify coping skills, available support systems and cultural and spiritual values  2. Provide emotional support, including active listening and acknowledgement of concerns of patient and caregivers  3. Reduce environmental stimuli, as able  4. Instruct patient/family in relaxation techniques, as appropriate  5. Assess for spiritual pain/suffering and initiate Spiritual Care, Psychosocial Clinical Specialist consults as needed  Outcome: Progressing     Problem: Change in Body Image  Goal: Pt/Family communicate acceptance of loss or change in body image and feel psychological comfort and peace  Description: INTERVENTIONS:  1. Assess patient/family anxiety and grief

## 2024-09-21 NOTE — PROGRESS NOTES
Patient:   Mary Lira  MR#:    [id number]   Room:    0817/817-02   YOB: 1975  Date of Progress Note: 9/21/2024  Time of Note                           10:26 AM  Consulting Physician:   Dario Triplett M.D.  Attending Physician:  Dario Triplett MD       CHIEF COMPLAINT: Right-sided weakness     Subjective  This 48 y.o. female   with history anxiety, sleep apnea, CHF, DM, HLD, morbid obesity, and HTN. She presented to [facility] ER on 9/3/24 with c/o right-sided weakness, slurred speech, headache, shortness of breath, cough with productive green sputum for about 1 week, nausea and diarrhea. Work-up in ER  Leukocytosis, Hg 10.5, Glucose 142, CR 1.2 And BUN 21. roponin borderline elevated.  Respiratory panel negative.  CT brain did not report any acute intracranial abnormality CTA Head/Neck reported 90% stenosis left ICA, 60% right ICA and Chest xray reported opacity left upper lobe. She received IV Ceftriaxone and IV Azithromycin in ER. She was admitted to the Hospitalist service with consult for neurology and vascular surgery. She was seen by neurologist Dr. Diaz and felt to have an acute ischemic stroke, patient was outside of the window for TNK. MRI done showed MRI brain showed left hemispheric small ischemic infarcts. She was placed on aspirin/Plavix and statin therapy. She was then seen by vascular surgeon Dr. Eliezer Palencia to address her severe left carotid stenosis. Dr. Palencia recommended a left carotid endarterectomy. Cardiology was consulted for surgery clearance. She was seen by Dr. Lazo. Lexiscan done showed ejection fraction 73% normal perfusion study no evidence of ischemia.  Dr. Lazo okayed to proceed with vascular surgery. Patient was in agreement and went for surgery on 9/6/24. She tolerated the procedure well. Patient was evaluated by SPT patient obtained 24 out of 30 possible points, indicative of mild cognitive-linguistic impairment (patient was 2/3 recall, 0/3 multi-step    Electronically signed by Gwendolyn Nance PTA on 9/20/2024 at 3:25 PM          09/20/24 1300   Restrictions/Precautions   Restrictions/Precautions Up Ad Trista   General   Additional Pertinent Hx New onset A-fib, anxiety, sleep apnea, CHF, DM, HLD, morbid obesity, and HTN   Diagnosis 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 with Right hemiparesis   Balance   Standing Balance Independent   Standing Balance   Time 6 mins x 2 occ   Activity R UE act   Functional Mobility   Functional - Mobility Device Rolling Walker   Activity Other   Assist Level Modified independent    Functional Mobility Comments in room and OT gym   OT Exercises   Exercise Treatment 12# Rickshaw   Dynamic Standing Balance Exercises Min to Mod dynamic standing with Wii   Activity Tolerance   Activity Tolerance Patient Tolerated treatment well   Assessment   Performance deficits / Impairments Decreased functional mobility ;Decreased strength;Decreased high-level IADLs;Decreased endurance   Treatment Diagnosis 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 with Right hemiparesis   Time Code Minutes    Timed Code Treatment Minutes 45 Minutes   OT Individual Minutes   Time In 1300   Time Out 1345   Minutes 45                   Cosigned by: Tawny Stacy OT at 9/20/2024  4:38 PM     RECORD REVIEW: Previous medical records, medications were reviewed at today's visit    IMPRESSION:     1.  Left hemisphere stroke status post left carotid endarterectomy-aspirin/statin-PT/OT/SLP  2.  New onset A-fib/DVT prophylaxis-Eliquis, diltiazem, metoprolol  3.  Anxiety-BuSpar, trazodone at night.  Zoloft  4.  Hyperlipidemia-Lipitor  5.  CHF-Lasix  6.  Diabetes-insulin and monitoring.  Stable  7.  GI-bowel regimen  Continue Lidoderm and tizanidine for sciatica.  Continue gabapentin for sciatica.  Not helping but has a history of peripheral edema and hate to increase it.  negative lumbar x-ray  Mag oxide helping leg cramps. Has longstanding peeling of

## 2024-09-22 LAB
GLUCOSE BLD-MCNC: 145 MG/DL (ref 70–99)
GLUCOSE BLD-MCNC: 173 MG/DL (ref 70–99)
GLUCOSE BLD-MCNC: 182 MG/DL (ref 70–99)
GLUCOSE BLD-MCNC: 210 MG/DL (ref 70–99)
PERFORMED ON: ABNORMAL

## 2024-09-22 PROCEDURE — 6370000000 HC RX 637 (ALT 250 FOR IP): Performed by: STUDENT IN AN ORGANIZED HEALTH CARE EDUCATION/TRAINING PROGRAM

## 2024-09-22 PROCEDURE — 1180000000 HC REHAB R&B

## 2024-09-22 PROCEDURE — 6370000000 HC RX 637 (ALT 250 FOR IP): Performed by: PSYCHIATRY & NEUROLOGY

## 2024-09-22 PROCEDURE — 82962 GLUCOSE BLOOD TEST: CPT

## 2024-09-22 PROCEDURE — 94760 N-INVAS EAR/PLS OXIMETRY 1: CPT

## 2024-09-22 PROCEDURE — 99232 SBSQ HOSP IP/OBS MODERATE 35: CPT | Performed by: PSYCHIATRY & NEUROLOGY

## 2024-09-22 RX ADMIN — SERTRALINE HYDROCHLORIDE 100 MG: 100 TABLET ORAL at 08:03

## 2024-09-22 RX ADMIN — TIZANIDINE 4 MG: 4 TABLET ORAL at 14:07

## 2024-09-22 RX ADMIN — GABAPENTIN 400 MG: 400 CAPSULE ORAL at 14:07

## 2024-09-22 RX ADMIN — TIZANIDINE 4 MG: 4 TABLET ORAL at 08:03

## 2024-09-22 RX ADMIN — MICONAZOLE NITRATE: 2 POWDER TOPICAL at 08:07

## 2024-09-22 RX ADMIN — BUSPIRONE HYDROCHLORIDE 5 MG: 5 TABLET ORAL at 08:03

## 2024-09-22 RX ADMIN — GABAPENTIN 400 MG: 400 CAPSULE ORAL at 20:34

## 2024-09-22 RX ADMIN — TIZANIDINE 4 MG: 4 TABLET ORAL at 20:34

## 2024-09-22 RX ADMIN — HYDROCODONE BITARTRATE AND ACETAMINOPHEN 1 TABLET: 7.5; 325 TABLET ORAL at 03:46

## 2024-09-22 RX ADMIN — SENNOSIDES AND DOCUSATE SODIUM 2 TABLET: 50; 8.6 TABLET ORAL at 08:03

## 2024-09-22 RX ADMIN — INSULIN GLARGINE 30 UNITS: 100 INJECTION, SOLUTION SUBCUTANEOUS at 08:10

## 2024-09-22 RX ADMIN — Medication 400 MG: at 20:35

## 2024-09-22 RX ADMIN — GABAPENTIN 400 MG: 400 CAPSULE ORAL at 08:02

## 2024-09-22 RX ADMIN — INSULIN LISPRO 3 UNITS: 100 INJECTION, SOLUTION INTRAVENOUS; SUBCUTANEOUS at 08:03

## 2024-09-22 RX ADMIN — METOPROLOL SUCCINATE 50 MG: 50 TABLET, EXTENDED RELEASE ORAL at 08:02

## 2024-09-22 RX ADMIN — HYDROCODONE BITARTRATE AND ACETAMINOPHEN 1 TABLET: 7.5; 325 TABLET ORAL at 12:12

## 2024-09-22 RX ADMIN — ATORVASTATIN CALCIUM 40 MG: 40 TABLET, FILM COATED ORAL at 20:34

## 2024-09-22 RX ADMIN — ASPIRIN 81 MG CHEWABLE TABLET 81 MG: 81 TABLET CHEWABLE at 08:03

## 2024-09-22 RX ADMIN — MICONAZOLE NITRATE: 2 POWDER TOPICAL at 20:35

## 2024-09-22 RX ADMIN — HYDROCODONE BITARTRATE AND ACETAMINOPHEN 1 TABLET: 7.5; 325 TABLET ORAL at 08:02

## 2024-09-22 RX ADMIN — APIXABAN 5 MG: 5 TABLET, FILM COATED ORAL at 08:03

## 2024-09-22 RX ADMIN — TRAZODONE HYDROCHLORIDE 100 MG: 100 TABLET ORAL at 20:35

## 2024-09-22 RX ADMIN — INSULIN LISPRO 3 UNITS: 100 INJECTION, SOLUTION INTRAVENOUS; SUBCUTANEOUS at 17:27

## 2024-09-22 RX ADMIN — DILTIAZEM HYDROCHLORIDE 300 MG: 180 CAPSULE, COATED, EXTENDED RELEASE ORAL at 08:02

## 2024-09-22 RX ADMIN — INSULIN LISPRO 3 UNITS: 100 INJECTION, SOLUTION INTRAVENOUS; SUBCUTANEOUS at 12:06

## 2024-09-22 RX ADMIN — HYDROCODONE BITARTRATE AND ACETAMINOPHEN 1 TABLET: 7.5; 325 TABLET ORAL at 21:43

## 2024-09-22 RX ADMIN — HYDROCODONE BITARTRATE AND ACETAMINOPHEN 1 TABLET: 7.5; 325 TABLET ORAL at 17:27

## 2024-09-22 RX ADMIN — FUROSEMIDE 20 MG: 20 TABLET ORAL at 08:03

## 2024-09-22 RX ADMIN — APIXABAN 5 MG: 5 TABLET, FILM COATED ORAL at 20:34

## 2024-09-22 ASSESSMENT — PAIN DESCRIPTION - LOCATION
LOCATION: BACK

## 2024-09-22 ASSESSMENT — PAIN DESCRIPTION - DESCRIPTORS
DESCRIPTORS: ACHING
DESCRIPTORS: ACHING
DESCRIPTORS: ACHING;DISCOMFORT
DESCRIPTORS: ACHING
DESCRIPTORS: ACHING

## 2024-09-22 ASSESSMENT — PAIN SCALES - GENERAL
PAINLEVEL_OUTOF10: 5
PAINLEVEL_OUTOF10: 6
PAINLEVEL_OUTOF10: 5
PAINLEVEL_OUTOF10: 6
PAINLEVEL_OUTOF10: 0
PAINLEVEL_OUTOF10: 6
PAINLEVEL_OUTOF10: 0
PAINLEVEL_OUTOF10: 6

## 2024-09-22 ASSESSMENT — PAIN - FUNCTIONAL ASSESSMENT: PAIN_FUNCTIONAL_ASSESSMENT: ACTIVITIES ARE NOT PREVENTED

## 2024-09-22 ASSESSMENT — PAIN DESCRIPTION - ORIENTATION: ORIENTATION: RIGHT

## 2024-09-22 ASSESSMENT — PAIN DESCRIPTION - PAIN TYPE: TYPE: ACUTE PAIN

## 2024-09-22 NOTE — PLAN OF CARE
Problem: Safety - Adult  Goal: Free from fall injury  Outcome: Progressing     Problem: Skin/Tissue Integrity  Goal: Absence of new skin breakdown  Description: 1.  Monitor for areas of redness and/or skin breakdown  2.  Assess vascular access sites hourly  3.  Every 4-6 hours minimum:  Change oxygen saturation probe site  4.  Every 4-6 hours:  If on nasal continuous positive airway pressure, respiratory therapy assess nares and determine need for appliance change or resting period.  Outcome: Progressing     Problem: ABCDS Injury Assessment  Goal: Absence of physical injury  Outcome: Progressing     Problem: Discharge Planning  Goal: Discharge to home or other facility with appropriate resources  Outcome: Progressing     Problem: Pain  Goal: Verbalizes/displays adequate comfort level or baseline comfort level  Outcome: Progressing     Problem: Chronic Conditions and Co-morbidities  Goal: Patient's chronic conditions and co-morbidity symptoms are monitored and maintained or improved  Outcome: Progressing     Problem: Neurosensory - Adult  Goal: Achieves stable or improved neurological status  Outcome: Progressing  Goal: Achieves maximal functionality and self care  Outcome: Progressing     Problem: Respiratory - Adult  Goal: Achieves optimal ventilation and oxygenation  Outcome: Progressing     Problem: Cardiovascular - Adult  Goal: Maintains optimal cardiac output and hemodynamic stability  Outcome: Progressing     Problem: Skin/Tissue Integrity - Adult  Goal: Skin integrity remains intact  Outcome: Progressing  Flowsheets (Taken 9/22/2024 1122)  Skin Integrity Remains Intact: Monitor for areas of redness and/or skin breakdown  Goal: Incisions, wounds, or drain sites healing without S/S of infection  Outcome: Progressing  Flowsheets (Taken 9/22/2024 1122)  Incisions, Wounds, or Drain Sites Healing Without Sign and Symptoms of Infection: TWICE DAILY: Assess and document skin integrity  Goal: Oral mucous membranes

## 2024-09-22 NOTE — PROGRESS NOTES
Patient:   Mary Lira  MR#:    [id number]   Room:    0817/817-02   YOB: 1975  Date of Progress Note: 9/22/2024  Time of Note                           12:08 PM  Consulting Physician:   Dario Triplett M.D.  Attending Physician:  Dario Triplett MD       CHIEF COMPLAINT: Right-sided weakness     Subjective  This 48 y.o. female   with history anxiety, sleep apnea, CHF, DM, HLD, morbid obesity, and HTN. She presented to [facility] ER on 9/3/24 with c/o right-sided weakness, slurred speech, headache, shortness of breath, cough with productive green sputum for about 1 week, nausea and diarrhea. Work-up in ER  Leukocytosis, Hg 10.5, Glucose 142, CR 1.2 And BUN 21. roponin borderline elevated.  Respiratory panel negative.  CT brain did not report any acute intracranial abnormality CTA Head/Neck reported 90% stenosis left ICA, 60% right ICA and Chest xray reported opacity left upper lobe. She received IV Ceftriaxone and IV Azithromycin in ER. She was admitted to the Hospitalist service with consult for neurology and vascular surgery. She was seen by neurologist Dr. Diaz and felt to have an acute ischemic stroke, patient was outside of the window for TNK. MRI done showed MRI brain showed left hemispheric small ischemic infarcts. She was placed on aspirin/Plavix and statin therapy. She was then seen by vascular surgeon Dr. Eliezer Palencia to address her severe left carotid stenosis. Dr. Palencia recommended a left carotid endarterectomy. Cardiology was consulted for surgery clearance. She was seen by Dr. Lazo. Lexiscan done showed ejection fraction 73% normal perfusion study no evidence of ischemia.  Dr. Lazo okayed to proceed with vascular surgery. Patient was in agreement and went for surgery on 9/6/24. She tolerated the procedure well. Patient was evaluated by SPT patient obtained 24 out of 30 possible points, indicative of mild cognitive-linguistic impairment (patient was 2/3 recall, 0/3 multi-step    General   Additional Pertinent Hx New onset A-fib, anxiety, sleep apnea, CHF, DM, HLD, morbid obesity, and HTN   Diagnosis 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 with Right hemiparesis   Balance   Standing Balance Independent   Standing Balance   Time 6 mins x 2 occ   Activity R UE act   Functional Mobility   Functional - Mobility Device Rolling Walker   Activity Other   Assist Level Modified independent    Functional Mobility Comments in room and OT gym   OT Exercises   Exercise Treatment 12# Rickshaw   Dynamic Standing Balance Exercises Min to Mod dynamic standing with Wii   Activity Tolerance   Activity Tolerance Patient Tolerated treatment well   Assessment   Performance deficits / Impairments Decreased functional mobility ;Decreased strength;Decreased high-level IADLs;Decreased endurance   Treatment Diagnosis 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 with Right hemiparesis   Time Code Minutes    Timed Code Treatment Minutes 45 Minutes   OT Individual Minutes   Time In 1300   Time Out 1345   Minutes 45                   Cosigned by: Tawny Stacy OT at 9/20/2024  4:38 PM     RECORD REVIEW: Previous medical records, medications were reviewed at today's visit    IMPRESSION:     1.  Left hemisphere stroke status post left carotid endarterectomy-aspirin/statin-PT/OT/SLP  2.  New onset A-fib/DVT prophylaxis-Eliquis, diltiazem, metoprolol  3.  Anxiety-BuSpar, trazodone at night.  Zoloft  4.  Hyperlipidemia-Lipitor  5.  CHF-Lasix  6.  Diabetes-insulin and monitoring.  Stable  7.  GI-bowel regimen  Continue Lidoderm and tizanidine for sciatica.  Continue gabapentin for sciatica.  Not helping but has a history of peripheral edema and hate to increase it.  negative lumbar x-ray  Mag oxide helping leg cramps. Has longstanding peeling of right sole. Suggest her to see derm as opt.  Continue current treatment.  Discharge 1 day next week  ELOS:9/26 , but TBD

## 2024-09-22 NOTE — PLAN OF CARE
Problem: Safety - Adult  Goal: Free from fall injury  9/21/2024 1947 by Med Ruvalcaba, RN  Outcome: Progressing  9/21/2024 1045 by Kandy Rowe, RN  Outcome: Progressing

## 2024-09-23 LAB
ANION GAP SERPL CALCULATED.3IONS-SCNC: 11 MMOL/L (ref 7–19)
BASOPHILS # BLD: 0.1 K/UL (ref 0–0.2)
BASOPHILS NFR BLD: 0.5 % (ref 0–1)
BUN SERPL-MCNC: 22 MG/DL (ref 6–20)
CALCIUM SERPL-MCNC: 8.9 MG/DL (ref 8.6–10)
CHLORIDE SERPL-SCNC: 101 MMOL/L (ref 98–111)
CO2 SERPL-SCNC: 26 MMOL/L (ref 22–29)
CREAT SERPL-MCNC: 0.7 MG/DL (ref 0.5–0.9)
EOSINOPHIL # BLD: 1.4 K/UL (ref 0–0.6)
EOSINOPHIL NFR BLD: 11.9 % (ref 0–5)
ERYTHROCYTE [DISTWIDTH] IN BLOOD BY AUTOMATED COUNT: 14.7 % (ref 11.5–14.5)
GLUCOSE BLD-MCNC: 150 MG/DL (ref 70–99)
GLUCOSE BLD-MCNC: 157 MG/DL (ref 70–99)
GLUCOSE BLD-MCNC: 157 MG/DL (ref 70–99)
GLUCOSE BLD-MCNC: 181 MG/DL (ref 70–99)
GLUCOSE SERPL-MCNC: 138 MG/DL (ref 70–99)
HCT VFR BLD AUTO: 33.4 % (ref 37–47)
HGB BLD-MCNC: 10.1 G/DL (ref 12–16)
IMM GRANULOCYTES # BLD: 0.1 K/UL
LYMPHOCYTES # BLD: 3.3 K/UL (ref 1.1–4.5)
LYMPHOCYTES NFR BLD: 28 % (ref 20–40)
MCH RBC QN AUTO: 24.2 PG (ref 27–31)
MCHC RBC AUTO-ENTMCNC: 30.2 G/DL (ref 33–37)
MCV RBC AUTO: 79.9 FL (ref 81–99)
MONOCYTES # BLD: 0.9 K/UL (ref 0–0.9)
MONOCYTES NFR BLD: 7.5 % (ref 0–10)
NEUTROPHILS # BLD: 6.1 K/UL (ref 1.5–7.5)
NEUTS SEG NFR BLD: 51.5 % (ref 50–65)
PERFORMED ON: ABNORMAL
PLATELET # BLD AUTO: 400 K/UL (ref 130–400)
PMV BLD AUTO: 10.8 FL (ref 9.4–12.3)
POTASSIUM SERPL-SCNC: 4.9 MMOL/L (ref 3.5–5)
RBC # BLD AUTO: 4.18 M/UL (ref 4.2–5.4)
SODIUM SERPL-SCNC: 138 MMOL/L (ref 136–145)
WBC # BLD AUTO: 11.9 K/UL (ref 4.8–10.8)

## 2024-09-23 PROCEDURE — 36415 COLL VENOUS BLD VENIPUNCTURE: CPT

## 2024-09-23 PROCEDURE — 82962 GLUCOSE BLOOD TEST: CPT

## 2024-09-23 PROCEDURE — 80048 BASIC METABOLIC PNL TOTAL CA: CPT

## 2024-09-23 PROCEDURE — 97116 GAIT TRAINING THERAPY: CPT

## 2024-09-23 PROCEDURE — 1180000000 HC REHAB R&B

## 2024-09-23 PROCEDURE — 85025 COMPLETE CBC W/AUTO DIFF WBC: CPT

## 2024-09-23 PROCEDURE — 97110 THERAPEUTIC EXERCISES: CPT

## 2024-09-23 PROCEDURE — 6370000000 HC RX 637 (ALT 250 FOR IP): Performed by: STUDENT IN AN ORGANIZED HEALTH CARE EDUCATION/TRAINING PROGRAM

## 2024-09-23 PROCEDURE — 6370000000 HC RX 637 (ALT 250 FOR IP): Performed by: PSYCHIATRY & NEUROLOGY

## 2024-09-23 PROCEDURE — 94760 N-INVAS EAR/PLS OXIMETRY 1: CPT

## 2024-09-23 PROCEDURE — 99232 SBSQ HOSP IP/OBS MODERATE 35: CPT | Performed by: PSYCHIATRY & NEUROLOGY

## 2024-09-23 PROCEDURE — 97530 THERAPEUTIC ACTIVITIES: CPT

## 2024-09-23 RX ORDER — MINERAL OIL/HYDROPHIL PETROLAT
OINTMENT (GRAM) TOPICAL 2 TIMES DAILY PRN
Status: DISCONTINUED | OUTPATIENT
Start: 2024-09-23 | End: 2024-09-26 | Stop reason: HOSPADM

## 2024-09-23 RX ORDER — INSULIN GLARGINE 100 [IU]/ML
INJECTION, SOLUTION SUBCUTANEOUS
Status: DISPENSED
Start: 2024-09-23 | End: 2024-09-23

## 2024-09-23 RX ADMIN — GABAPENTIN 400 MG: 400 CAPSULE ORAL at 19:49

## 2024-09-23 RX ADMIN — WHITE PETROLATUM: 1.75 OINTMENT TOPICAL at 14:58

## 2024-09-23 RX ADMIN — MICONAZOLE NITRATE: 2 POWDER TOPICAL at 19:51

## 2024-09-23 RX ADMIN — SERTRALINE HYDROCHLORIDE 100 MG: 100 TABLET ORAL at 08:18

## 2024-09-23 RX ADMIN — TRAZODONE HYDROCHLORIDE 100 MG: 100 TABLET ORAL at 19:49

## 2024-09-23 RX ADMIN — APIXABAN 5 MG: 5 TABLET, FILM COATED ORAL at 08:18

## 2024-09-23 RX ADMIN — TIZANIDINE 4 MG: 4 TABLET ORAL at 13:27

## 2024-09-23 RX ADMIN — INSULIN LISPRO 3 UNITS: 100 INJECTION, SOLUTION INTRAVENOUS; SUBCUTANEOUS at 08:30

## 2024-09-23 RX ADMIN — TIZANIDINE 4 MG: 4 TABLET ORAL at 19:49

## 2024-09-23 RX ADMIN — GABAPENTIN 400 MG: 400 CAPSULE ORAL at 13:27

## 2024-09-23 RX ADMIN — GABAPENTIN 400 MG: 400 CAPSULE ORAL at 08:18

## 2024-09-23 RX ADMIN — DILTIAZEM HYDROCHLORIDE 300 MG: 180 CAPSULE, COATED, EXTENDED RELEASE ORAL at 08:18

## 2024-09-23 RX ADMIN — HYDROCODONE BITARTRATE AND ACETAMINOPHEN 1 TABLET: 7.5; 325 TABLET ORAL at 04:40

## 2024-09-23 RX ADMIN — BUSPIRONE HYDROCHLORIDE 5 MG: 5 TABLET ORAL at 08:18

## 2024-09-23 RX ADMIN — Medication 400 MG: at 19:49

## 2024-09-23 RX ADMIN — FUROSEMIDE 20 MG: 20 TABLET ORAL at 08:19

## 2024-09-23 RX ADMIN — INSULIN GLARGINE 30 UNITS: 100 INJECTION, SOLUTION SUBCUTANEOUS at 08:30

## 2024-09-23 RX ADMIN — ATORVASTATIN CALCIUM 40 MG: 40 TABLET, FILM COATED ORAL at 19:49

## 2024-09-23 RX ADMIN — HYDROCODONE BITARTRATE AND ACETAMINOPHEN 1 TABLET: 7.5; 325 TABLET ORAL at 09:25

## 2024-09-23 RX ADMIN — METOPROLOL SUCCINATE 50 MG: 50 TABLET, EXTENDED RELEASE ORAL at 08:18

## 2024-09-23 RX ADMIN — APIXABAN 5 MG: 5 TABLET, FILM COATED ORAL at 19:49

## 2024-09-23 RX ADMIN — SENNOSIDES AND DOCUSATE SODIUM 2 TABLET: 50; 8.6 TABLET ORAL at 08:19

## 2024-09-23 RX ADMIN — INSULIN LISPRO 3 UNITS: 100 INJECTION, SOLUTION INTRAVENOUS; SUBCUTANEOUS at 16:55

## 2024-09-23 RX ADMIN — TIZANIDINE 4 MG: 4 TABLET ORAL at 08:18

## 2024-09-23 RX ADMIN — HYDROCODONE BITARTRATE AND ACETAMINOPHEN 1 TABLET: 7.5; 325 TABLET ORAL at 19:49

## 2024-09-23 RX ADMIN — INSULIN LISPRO 3 UNITS: 100 INJECTION, SOLUTION INTRAVENOUS; SUBCUTANEOUS at 11:55

## 2024-09-23 RX ADMIN — ASPIRIN 81 MG CHEWABLE TABLET 81 MG: 81 TABLET CHEWABLE at 08:18

## 2024-09-23 RX ADMIN — HYDROCODONE BITARTRATE AND ACETAMINOPHEN 1 TABLET: 7.5; 325 TABLET ORAL at 14:57

## 2024-09-23 ASSESSMENT — PAIN SCALES - GENERAL
PAINLEVEL_OUTOF10: 5
PAINLEVEL_OUTOF10: 7
PAINLEVEL_OUTOF10: 0
PAINLEVEL_OUTOF10: 3
PAINLEVEL_OUTOF10: 2
PAINLEVEL_OUTOF10: 6
PAINLEVEL_OUTOF10: 8
PAINLEVEL_OUTOF10: 3
PAINLEVEL_OUTOF10: 7
PAINLEVEL_OUTOF10: 6

## 2024-09-23 ASSESSMENT — PAIN DESCRIPTION - DESCRIPTORS
DESCRIPTORS: ACHING;DISCOMFORT
DESCRIPTORS: ACHING

## 2024-09-23 ASSESSMENT — PAIN - FUNCTIONAL ASSESSMENT
PAIN_FUNCTIONAL_ASSESSMENT: ACTIVITIES ARE NOT PREVENTED

## 2024-09-23 ASSESSMENT — PAIN DESCRIPTION - LOCATION
LOCATION: HIP
LOCATION: BACK;HIP
LOCATION: HIP
LOCATION: HIP
LOCATION: BACK;HIP
LOCATION: BACK

## 2024-09-23 ASSESSMENT — PAIN DESCRIPTION - ORIENTATION
ORIENTATION: RIGHT
ORIENTATION: POSTERIOR;RIGHT

## 2024-09-23 NOTE — PROGRESS NOTES
admitting this patient for an intensive inpatient rehabilitation program.        Dario Triplett, MD__________________ _____________________ ________________   Physician Signature      Physician Name (print)   Physician NPI          Date

## 2024-09-23 NOTE — PLAN OF CARE
Problem: ABCDS Injury Assessment  Goal: Absence of physical injury  9/22/2024 1122 by Jocelyn Dos Santos RN  Outcome: Progressing

## 2024-09-23 NOTE — PROGRESS NOTES
Occupational Therapy  Facility/Department: [facility] 8 REHAB UNIT  Rehabilitation Occupational Therapy Daily Treatment Note    Date: 24  Patient Name: Mary Lira       Room: 0817/817-02  MRN: [id number]  Account: [id number]   : 1975  (48 y.o.) Gender: female        Diagnosis: (P) 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 with Right hemiparesis  Additional Pertinent Hx: (P) New onset A-fib, anxiety, sleep apnea, CHF, DM, HLD, morbid obesity, and HTN    Treatment Diagnosis: (P) 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 with Right hemiparesis   Past Medical History:  has a past medical history of Anxiety, Apnea, CHF (congestive heart failure) (HCC), Diabetes mellitus (HCC), Hyperlipidemia, Hypertension, and Palliative care patient.  Past Surgical History:   has a past surgical history that includes Appendectomy and Carotid endarterectomy (Left, 2024).     24 1400   Restrictions/Precautions   Restrictions/Precautions Up Ad Trista   General   Additional Pertinent Hx New onset A-fib, anxiety, sleep apnea, CHF, DM, HLD, morbid obesity, and HTN   Diagnosis 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 with Right hemiparesis   Functional Mobility   Functional - Mobility Device Rolling Walker   Assist Level Modified independent    Functional Mobility Comments in pt room and OT   Bed mobility   Rolling to Left Independent   Rolling to Right Independent   Supine to Sit Independent   Sit to Supine Independent   Bed Mobility Comments on mat table   Transfers   Sit to stand Independent   Stand to sit Independent   OT Exercises   Exercise Treatment 2# FW HEP   Long Term Goals   Long Term Goal 4 MET   Assessment   Performance deficits / Impairments Decreased functional mobility ;Decreased strength;Decreased high-level IADLs;Decreased endurance   Assessment Arranged DME. Educated pt on bed positioning  for management of SI joint pain, States it started before stroke but has worsened  since.   Treatment Diagnosis 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 with Right hemiparesis   Education   Education Given To Patient   Education Provided Precautions;Home Exercise Program   Education Provided Comments joint protection and pain management   Education Method Demonstration;Verbal;Teach Back   Barriers to Learning None   Education Outcome Verbalized understanding   Time Code Minutes    Timed Code Treatment Minutes 30 Minutes   OT Individual Minutes   Time In 1400   Time Out 1430   Minutes 30

## 2024-09-23 NOTE — PROGRESS NOTES
Nutrition Assessment     Type and Reason for Visit: Reassess  Malnutrition Assessment:  Malnutrition Status: No malnutrition    Nutrition Assessment:  Pt remains nutritionally stable. PO intake avg >75% of meals. Wt remains stable and consistent with admission wt. BG ranging 145-210mg/dL. No nutrition-related needs identified at this time. Continue POC.    Nutrition Related Findings:   MljG4k=0.4% (9/3/24); Home DM meds: Basaglar 40u daily, Glipizide 5mg BID, Humalog 10u TIDAC Wound Type: Skin Tears    Current Nutrition Therapies:    ADULT DIET; Regular; 5 carb choices (75 gm/meal)    Anthropometric Measures:  Height: 160 cm (5' 3\")  Current Body Wt: 118.4 kg (261 lb 0.4 oz)   BMI: 46.3    Nutrition Diagnosis:   No nutrition diagnosis at this time    Nutrition Interventions:   Food and/or Nutrient Delivery: Continue Current Diet  Nutrition Education/Counseling: No recommendation at this time  Coordination of Nutrition Care: Continue to monitor while inpatient       Goals:  Previous Goal Met: Goal(s) Achieved  Goals: PO intake 50% or greater       Nutrition Monitoring and Evaluation:   Behavioral-Environmental Outcomes: None Identified  Food/Nutrient Intake Outcomes: Food and Nutrient Intake  Physical Signs/Symptoms Outcomes: Biochemical Data, Nutrition Focused Physical Findings, Weight, Skin    Discharge Planning:    Continue current diet     Beatris Espinosa, MS, RD, LD, [facility]  Contact: [phone]

## 2024-09-23 NOTE — PROGRESS NOTES
Discharge planned for Thursday, 9/26. MSW met with patient re dc plan. No definite dc needs identified, other than DME ordered by OT.     Electronically signed by LAURA Burton on 9/23/24 at 2:53 PM CDT

## 2024-09-23 NOTE — PROGRESS NOTES
09/23/24 0800   General   Chart Reviewed Yes   Subjective   Subjective The hip hurts but  I  fight thru it   Vitals   O2 Device None (Room air)   Transfers   Sit to Stand Modified independent   Stand to Sit Modified independent   Lateral Transfers Modified independent  (Stand step with RW)   Ambulation   Surface Level tile   Device Rolling Walker   Assistance Supervision   Quality of Gait Steady NO LOB   Distance 150   Ambulation 2   Surface - 2 level tile   Device 2 Rolling Walker   Assistance 2 Supervision   Quality of Gait 2 same as above   Distance 75   Ambulation 3   Surface - 3 level tile   Device 3 Rolling Walker   Assistance 3 Supervision   Quality of Gait 3 same as above   PT Exercises   Resistive Exercises BL LE EX standing holding // c 1.5# X 10 reps   Safety Devices   Type of Devices Left in chair;Call light within reach  (Patient up ad emil)       Electronically signed by Hugo Andrews PTA on 9/23/2024 at 9:12 AM

## 2024-09-23 NOTE — PROGRESS NOTES
09/23/24 1300   General   Chart Reviewed Yes   Subjective   Subjective Patient in recliner has R hip pain 8/10.  \" It has really bothered me today. \"   Vitals   O2 Device None (Room air)   Subjective   Pain 8/10   Transfers   Sit to Stand Modified independent   Stand to Sit Modified independent   Bed to Chair Modified independent   Ambulation   Surface Level tile   Device Rollator   Assistance Supervision   Quality of Gait Steady NO LOB   Distance 150   More Ambulation? Yes   Ambulation 2   Surface - 2 level tile   Device 2 Rollator   Other Apparatus 2   (Cones set up to weave around)   Assistance 2 Supervision   Quality of Gait 2 same as above   Distance 100   Comments Patient safely locked rollator & sat between walks.   Ambulation 3   Surface - 3 level tile   Device 3 Rollator   Assistance 3 Supervision   Quality of Gait 3 same as above   Distance 275   Activity Tolerance   Activity Tolerance Patient tolerated treatment well   Safety Devices   Type of Devices Call light within reach;Left in chair  (Patient up ad emil.)       Electronically signed by Hugo Andrews PTA on 9/23/2024 at 1:43 PM

## 2024-09-23 NOTE — PROGRESS NOTES
--   --   --    Gait Deviations  --   --   --    Distance  --   --   --    Comments  --   --   --    PT Exercises   Exercise Treatment Sitting BLE exercises  x15 reps with 1-1/2 lb weights.  --   --    Activity Tolerance   Activity Tolerance  --   --  Patient tolerated treatment well   Assessment   Assessment  --   --  Pt. states she likes the support of using RW vs Rollator when she is hurting.  Tolerated session well.   Safety Devices   Type of Devices  --   --   --    PT Individual Minutes   Time In  --  1430  --    Time Out  --  1515  --    Minutes  --  45  --         09/20/24 1505 09/20/24 1523   Restrictions/Precautions   Restrictions/Precautions  --  Up Ad Trista   Lower Extremity Weight Bearing Restrictions   Right Lower Extremity Weight Bearing  --  Weight Bearing As Tolerated   General   Additional Pertinent Hx  --  pneumonia, lipoprotein metabolism disorder unspecified, DM 2, anxiety disorder, obesity, obstructive sleep apnea, heart failure   Diagnosis  --  acute ischemic stroke, R side weakness, dysarthria, leukocytosis, PNA   Subjective   Subjective  --  Pt. agreeable to therapy   Vitals   Temp  --   --    Pulse  --   --    Respirations  --   --    SpO2  --   --    BP  --   --    MAP (Calculated)  --   --    Transfers   Sit to Stand  --   --    Stand to Sit  --   --    Lateral Transfers  --   --    Ambulation   Surface  --   --    Device  --   --    Assistance  --   --    Quality of Gait  --   --    Gait Deviations  --   --    Distance  --   --    Comments  --   --    PT Exercises   Exercise Treatment  --   --    Activity Tolerance   Activity Tolerance  --   --    Assessment   Assessment  --   --    Safety Devices   Type of Devices Call light within reach  --    PT Individual Minutes   Time In  --   --    Time Out  --   --    Minutes  --   --       Electronically signed by Gwendolyn Nance PTA on 9/20/2024 at 3:25 PM          09/20/24 1300   Restrictions/Precautions   Restrictions/Precautions Up Ad Trista    General   Additional Pertinent Hx New onset A-fib, anxiety, sleep apnea, CHF, DM, HLD, morbid obesity, and HTN   Diagnosis 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 with Right hemiparesis   Balance   Standing Balance Independent   Standing Balance   Time 6 mins x 2 occ   Activity R UE act   Functional Mobility   Functional - Mobility Device Rolling Walker   Activity Other   Assist Level Modified independent    Functional Mobility Comments in room and OT gym   OT Exercises   Exercise Treatment 12# Rickshaw   Dynamic Standing Balance Exercises Min to Mod dynamic standing with Wii   Activity Tolerance   Activity Tolerance Patient Tolerated treatment well   Assessment   Performance deficits / Impairments Decreased functional mobility ;Decreased strength;Decreased high-level IADLs;Decreased endurance   Treatment Diagnosis 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 with Right hemiparesis   Time Code Minutes    Timed Code Treatment Minutes 45 Minutes   OT Individual Minutes   Time In 1300   Time Out 1345   Minutes 45                   Cosigned by: Tawny Stacy OT at 9/20/2024  4:38 PM     RECORD REVIEW: Previous medical records, medications were reviewed at today's visit    IMPRESSION:     1.  Left hemisphere stroke status post left carotid endarterectomy-aspirin/statin-PT/OT/SLP  2.  New onset A-fib/DVT prophylaxis-Eliquis, diltiazem, metoprolol  3.  Anxiety-BuSpar, trazodone at night.  Zoloft  4.  Hyperlipidemia-Lipitor  5.  CHF-Lasix  6.  Diabetes-insulin and monitoring.  Stable  7.  GI-bowel regimen  Continue Lidoderm and tizanidine for sciatica.  Continue gabapentin for sciatica.  Not helping but has a history of peripheral edema and hate to increase it.  negative lumbar x-ray  Mag oxide helping leg cramps. Has longstanding peeling of right sole. Suggest her to see derm as opt.  Continue as is.  Discharge 1 day this week  ELOS:9/26 , but TBD

## 2024-09-23 NOTE — PATIENT CARE CONFERENCE
[facility] ACUTE INPATIENT REHABILITATION  TEAM CONFERENCE NOTE    Date: 2024  Patient Name: Mary Lira        MRN: [id number]    : 1975  (48 y.o.)  Gender: female   Referring Practitioner: Sandeep Diaz MD, Howie Matute MD  Diagnosis: acute ischemic stroke, R side weakness, dysarthria, leukocytosis, PNA      PHYSICAL THERAPY  GROSS ASSESSMENT       BED MOBILITY  Bed mobility  Rolling to Left: Modified independent  Rolling to Right: Modified independent  Supine to Sit: Modified independent  Sit to Supine: Modified independent  Scooting: Modified independent  Bed Mobility Comments: in recliner       TRANSFERS  Transfers  Sit to Stand: Modified independent  Stand to Sit: Modified independent  Bed to Chair: Modified independent  Stand Pivot Transfers: Stand by assistance  Lateral Transfers: Modified independent (Recliner to/from W/C.)  Car Transfer: Contact guard assistance  Comment: pt hard on herself in instances of forgetting to reach back for chair, but occasional and controlled  WHEELCHAIR PROPULSION  Propulsion 1  Propulsion: Manual  Level: Level Tile  Method: THEE RUIZ  Level of Assistance: Modified independent  Description/ Details: With 3 turns  Distance: 150  AMBULATION  Ambulation  Surface: Level tile  Device: Rolling Walker  Other Apparatus: Wheelchair follow (due to previous low bp by nsg)  Assistance: Stand by assistance, Independent  Quality of Gait: Steady NO LOB  Gait Deviations: Decreased step length, Decreased step height  Distance: 150' x2  Comments: incorporated turns no LOB  More Ambulation?: Yes  STAIRS  Stairs  # Steps : 7  Stairs Height: 6\"  Rails: Bilateral  Assistance: Supervision  Comment: no LOB or sign of distress.  GOALS:  Short Term Goals  Time Frame for Short Term Goals: 1 wk  Short Term Goal 1: supervision bed mobiliyt  Short Term Goal 2: supervision transfers  Short Term Goal 3: supervision amb with AD 50ft 2 turns  Short Term Goal 4: supervision car

## 2024-09-23 NOTE — PROGRESS NOTES
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  Past Surgical History:   has a past surgical history that includes Appendectomy and Carotid endarterectomy (Left, 2024).     24 1100   Restrictions/Precautions   Restrictions/Precautions Up Ad Trista   General   Additional Pertinent Hx New onset A-fib, anxiety, sleep apnea, CHF, DM, HLD, morbid obesity, and HTN   Diagnosis 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 with Right hemiparesis   Subjective   Subjective Reports not sleeping well d/t back/hip pain   Balance   Standing Balance Independent   Standing Balance   Activity cooking task   Comment had to sit at times d/t back pain   Functional Mobility   Functional - Mobility Device Rolling Walker  (an no device but using counter in kitchen)   Activity Retrieve items;Transport items;Other   Assist Level Modified independent    Functional Mobility Comments short distance ambulatory tasks only d/t back pain   Transfers   Sit to stand Independent   Stand to sit Independent   OT Exercises   Exercise Treatment 2# Tbar   Long Term Goals   Long Term Goal 5 MET   Activity Tolerance   Activity Tolerance Patient Tolerated treatment well   Assessment   Performance deficits / Impairments Decreased

## 2024-09-24 LAB
GLUCOSE BLD-MCNC: 125 MG/DL (ref 70–99)
GLUCOSE BLD-MCNC: 141 MG/DL (ref 70–99)
GLUCOSE BLD-MCNC: 148 MG/DL (ref 70–99)
GLUCOSE BLD-MCNC: 199 MG/DL (ref 70–99)
PERFORMED ON: ABNORMAL

## 2024-09-24 PROCEDURE — 99233 SBSQ HOSP IP/OBS HIGH 50: CPT | Performed by: PSYCHIATRY & NEUROLOGY

## 2024-09-24 PROCEDURE — 6370000000 HC RX 637 (ALT 250 FOR IP): Performed by: STUDENT IN AN ORGANIZED HEALTH CARE EDUCATION/TRAINING PROGRAM

## 2024-09-24 PROCEDURE — 97110 THERAPEUTIC EXERCISES: CPT

## 2024-09-24 PROCEDURE — 94760 N-INVAS EAR/PLS OXIMETRY 1: CPT

## 2024-09-24 PROCEDURE — 82962 GLUCOSE BLOOD TEST: CPT

## 2024-09-24 PROCEDURE — 97530 THERAPEUTIC ACTIVITIES: CPT

## 2024-09-24 PROCEDURE — 6370000000 HC RX 637 (ALT 250 FOR IP): Performed by: PSYCHIATRY & NEUROLOGY

## 2024-09-24 PROCEDURE — 1180000000 HC REHAB R&B

## 2024-09-24 PROCEDURE — 97535 SELF CARE MNGMENT TRAINING: CPT

## 2024-09-24 RX ADMIN — SENNOSIDES AND DOCUSATE SODIUM 2 TABLET: 50; 8.6 TABLET ORAL at 07:59

## 2024-09-24 RX ADMIN — INSULIN GLARGINE 30 UNITS: 100 INJECTION, SOLUTION SUBCUTANEOUS at 08:05

## 2024-09-24 RX ADMIN — TIZANIDINE 4 MG: 4 TABLET ORAL at 21:08

## 2024-09-24 RX ADMIN — MICONAZOLE NITRATE: 2 POWDER TOPICAL at 08:09

## 2024-09-24 RX ADMIN — ATORVASTATIN CALCIUM 40 MG: 40 TABLET, FILM COATED ORAL at 21:08

## 2024-09-24 RX ADMIN — TIZANIDINE 4 MG: 4 TABLET ORAL at 08:02

## 2024-09-24 RX ADMIN — DILTIAZEM HYDROCHLORIDE 300 MG: 180 CAPSULE, COATED, EXTENDED RELEASE ORAL at 08:00

## 2024-09-24 RX ADMIN — SERTRALINE HYDROCHLORIDE 100 MG: 100 TABLET ORAL at 08:02

## 2024-09-24 RX ADMIN — TIZANIDINE 4 MG: 4 TABLET ORAL at 14:26

## 2024-09-24 RX ADMIN — Medication 400 MG: at 21:08

## 2024-09-24 RX ADMIN — GABAPENTIN 400 MG: 400 CAPSULE ORAL at 08:02

## 2024-09-24 RX ADMIN — GABAPENTIN 400 MG: 400 CAPSULE ORAL at 21:08

## 2024-09-24 RX ADMIN — HYDROCODONE BITARTRATE AND ACETAMINOPHEN 1 TABLET: 7.5; 325 TABLET ORAL at 12:20

## 2024-09-24 RX ADMIN — BUSPIRONE HYDROCHLORIDE 5 MG: 5 TABLET ORAL at 08:02

## 2024-09-24 RX ADMIN — APIXABAN 5 MG: 5 TABLET, FILM COATED ORAL at 21:08

## 2024-09-24 RX ADMIN — GABAPENTIN 400 MG: 400 CAPSULE ORAL at 14:26

## 2024-09-24 RX ADMIN — INSULIN LISPRO 3 UNITS: 100 INJECTION, SOLUTION INTRAVENOUS; SUBCUTANEOUS at 12:20

## 2024-09-24 RX ADMIN — HYDROCODONE BITARTRATE AND ACETAMINOPHEN 1 TABLET: 7.5; 325 TABLET ORAL at 18:37

## 2024-09-24 RX ADMIN — TRAZODONE HYDROCHLORIDE 100 MG: 100 TABLET ORAL at 21:10

## 2024-09-24 RX ADMIN — INSULIN LISPRO 3 UNITS: 100 INJECTION, SOLUTION INTRAVENOUS; SUBCUTANEOUS at 17:27

## 2024-09-24 RX ADMIN — INSULIN LISPRO 3 UNITS: 100 INJECTION, SOLUTION INTRAVENOUS; SUBCUTANEOUS at 08:06

## 2024-09-24 RX ADMIN — MICONAZOLE NITRATE: 2 POWDER TOPICAL at 21:08

## 2024-09-24 RX ADMIN — HYDROCODONE BITARTRATE AND ACETAMINOPHEN 1 TABLET: 7.5; 325 TABLET ORAL at 07:59

## 2024-09-24 RX ADMIN — HYDROCODONE BITARTRATE AND ACETAMINOPHEN 1 TABLET: 7.5; 325 TABLET ORAL at 22:42

## 2024-09-24 RX ADMIN — FUROSEMIDE 20 MG: 20 TABLET ORAL at 08:02

## 2024-09-24 RX ADMIN — METOPROLOL SUCCINATE 50 MG: 50 TABLET, EXTENDED RELEASE ORAL at 08:00

## 2024-09-24 RX ADMIN — APIXABAN 5 MG: 5 TABLET, FILM COATED ORAL at 07:59

## 2024-09-24 RX ADMIN — ASPIRIN 81 MG CHEWABLE TABLET 81 MG: 81 TABLET CHEWABLE at 08:02

## 2024-09-24 RX ADMIN — HYDROCODONE BITARTRATE AND ACETAMINOPHEN 1 TABLET: 7.5; 325 TABLET ORAL at 02:20

## 2024-09-24 ASSESSMENT — PAIN - FUNCTIONAL ASSESSMENT
PAIN_FUNCTIONAL_ASSESSMENT: ACTIVITIES ARE NOT PREVENTED

## 2024-09-24 ASSESSMENT — PAIN DESCRIPTION - LOCATION
LOCATION: HIP
LOCATION: BACK
LOCATION: GENERALIZED
LOCATION: BACK

## 2024-09-24 ASSESSMENT — PAIN DESCRIPTION - DESCRIPTORS
DESCRIPTORS: ACHING;DISCOMFORT
DESCRIPTORS: ACHING
DESCRIPTORS: ACHING;DISCOMFORT
DESCRIPTORS: DISCOMFORT
DESCRIPTORS: DISCOMFORT
DESCRIPTORS: ACHING

## 2024-09-24 ASSESSMENT — PAIN SCALES - GENERAL
PAINLEVEL_OUTOF10: 0
PAINLEVEL_OUTOF10: 5
PAINLEVEL_OUTOF10: 6
PAINLEVEL_OUTOF10: 5
PAINLEVEL_OUTOF10: 6
PAINLEVEL_OUTOF10: 6
PAINLEVEL_OUTOF10: 4
PAINLEVEL_OUTOF10: 2
PAINLEVEL_OUTOF10: 0

## 2024-09-24 ASSESSMENT — PAIN DESCRIPTION - ORIENTATION
ORIENTATION: RIGHT
ORIENTATION: RIGHT

## 2024-09-24 ASSESSMENT — PAIN DESCRIPTION - PAIN TYPE: TYPE: ACUTE PAIN

## 2024-09-24 NOTE — PROGRESS NOTES
Name: Mary Lira  MRN: [id number]  Date of Service:  9/24/2024 09/24/24 1300   Restrictions/Precautions   Restrictions/Precautions Up Ad Trista   Lower Extremity Weight Bearing Restrictions   Right Lower Extremity Weight Bearing Weight Bearing As Tolerated   General   Chart Reviewed Yes   Patient assessed for rehabilitation services? Yes   Additional Pertinent Hx pneumonia, lipoprotein metabolism disorder unspecified, DM 2, anxiety disorder, obesity, obstructive sleep apnea, heart failure   Family / Caregiver Present No   Diagnosis acute ischemic stroke, R side weakness, dysarthria, leukocytosis, PNA   Follows Commands WFL   Subjective   Subjective Pt sitting in recliner, agrees to participate in therapy.   Subjective   Subjective R back and hip   Pain Verbal: 3/10 (Pt reports therapists worked on SI joint discomfort earlier today and she previously had pain pill)   Bed mobility   Sit to Supine Independent   Scooting Independent   Bed Mobility Comments On R side of bed- no use of bedrail  (Pt enters bed on L knee and rolls over)   Transfers   Sit to Stand Modified independent;Independent   Stand to Sit Modified independent;Independent   Bed to Chair Modified independent  (Personal rollator)   Ambulation   Surface Level tile   Device Rollator   Assistance Modified Independent   Quality of Gait reciprocal, no LOB, slight FFP   Gait Deviations Decreased step length;Decreased step height   Distance 15' X 2  (recliner<>EOB)   Comments Multiple L/R turns   PT Exercises   Exercise Treatment Pt performed aspects of supine BLE ther-ex HEP   Assessment   Assessment Pt reports she is staying w/ daughter's friend upon d/c, there are no stairs to enter. Pt able to amb. in room w/ rollator w/ MI and perform bed mobility w/ [place]. Pt also able to tolerate supine BLE ther-ex w/ min facial grimacing (w/ RLE) and no excessive fatigue.   Discharge Recommendations Continue to assess pending progress;Home with Home health

## 2024-09-24 NOTE — PROGRESS NOTES
Name: Mary Lira  MRN: [id number]  Date of Service:  9/24/2024 09/24/24 0815   Restrictions/Precautions   Restrictions/Precautions Up Ad Trista   Required Braces or Orthoses? No   Lower Extremity Weight Bearing Restrictions   Right Lower Extremity Weight Bearing Weight Bearing As Tolerated   General   Chart Reviewed Yes   Patient assessed for rehabilitation services? Yes   Additional Pertinent Hx pneumonia, lipoprotein metabolism disorder unspecified, DM 2, anxiety disorder, obesity, obstructive sleep apnea, heart failure   Diagnosis acute ischemic stroke, R side weakness, dysarthria, leukocytosis, PNA   Follows Commands WFL   General Comment   Comments Pt reports she wants to go to therapy w/ RW this morning   Subjective   Subjective Pt sitting in recliner, agrees to partivcipate in therapy.   Subjective   Subjective R back and hip   Pain Verbal: 6/10   Transfers   Sit to Stand Modified independent   Stand to Sit Modified independent   Bed to Chair Modified independent  (recliner<w/c w/o AD)   Ambulation   Surface Level tile   Device Rolling Walker   Assistance Modified Independent   Quality of Gait reciprocal, no LOB, slight FFP   Gait Deviations Decreased step length;Decreased step height   Distance 20' in room   Comments Multiple L/R turns   PT Exercises   Exercise Treatment Pt performed aspects of sitting BLE ther-ex HEP   Activity Tolerance   Activity Tolerance Patient tolerated treatment well   Assessment   Assessment Pt states she likes to use RW instead of rollator right now as sciatica is acting up (wasn't hurting as bad before hospitalization). Pt able to perform sitting BLE ther-ex w/o c/o of increased ain from baseline and no excessive fatigue.   Education   Education Given To Patient   Education Provided Home Exercise Program   Education Provided Comments Sitting BLE ther-ex HEP   Education Method Verbal;Printed Information/Hand-outs   Barriers to Learning None   Education Outcome Verbalized

## 2024-09-24 NOTE — PLAN OF CARE
Problem: Safety - Adult  Goal: Free from fall injury  9/24/2024 1053 by Jocelyn Dos Santos RN  Outcome: Progressing  9/23/2024 2204 by Susannah Evans RN  Outcome: Progressing     Problem: Skin/Tissue Integrity  Goal: Absence of new skin breakdown  Description: 1.  Monitor for areas of redness and/or skin breakdown  2.  Assess vascular access sites hourly  3.  Every 4-6 hours minimum:  Change oxygen saturation probe site  4.  Every 4-6 hours:  If on nasal continuous positive airway pressure, respiratory therapy assess nares and determine need for appliance change or resting period.  9/24/2024 1053 by Jocelyn Dos Santos RN  Outcome: Progressing  9/23/2024 2204 by Susannah Evans RN  Outcome: Progressing     Problem: ABCDS Injury Assessment  Goal: Absence of physical injury  9/24/2024 1053 by Jocelyn Dos Santos RN  Outcome: Progressing  9/23/2024 2204 by Susannah Evans RN  Outcome: Progressing     Problem: Discharge Planning  Goal: Discharge to home or other facility with appropriate resources  9/24/2024 1053 by Jocelyn Dos Satnos RN  Outcome: Progressing  Flowsheets (Taken 9/24/2024 0759)  Discharge to home or other facility with appropriate resources: Refer to discharge planning if patient needs post-hospital services based on physician order or complex needs related to functional status, cognitive ability or social support system  9/23/2024 2204 by Susannah Evans RN  Outcome: Progressing  Flowsheets (Taken 9/23/2024 1950)  Discharge to home or other facility with appropriate resources: Refer to discharge planning if patient needs post-hospital services based on physician order or complex needs related to functional status, cognitive ability or social support system     Problem: Pain  Goal: Verbalizes/displays adequate comfort level or baseline comfort level  9/24/2024 1053 by Jocelyn Dos Santos RN  Outcome: Progressing  9/23/2024 2204 by Susannah Evans RN  Outcome: Progressing     Problem: Chronic Conditions and  Progressing  9/23/2024 2204 by Susannah Evans, RN  Outcome: Progressing

## 2024-09-24 NOTE — PROGRESS NOTES
Occupational Therapy  Facility/Department: [facility] 8 REHAB UNIT  Rehabilitation Occupational Therapy Daily Treatment Note    Date: 24  Patient Name: Mary Lira       Room: 0817/817-02  MRN: [id number]  Account: [id number]   : 1975  (48 y.o.) Gender: female        Diagnosis: (P) 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 with Right hemiparesis       Treatment Diagnosis: (P) 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 with Right hemiparesis   Past Medical History:  has a past medical history of Anxiety, Apnea, CHF (congestive heart failure) (HCC), Diabetes mellitus (HCC), Hyperlipidemia, Hypertension, and Palliative care patient.  Past Surgical History:   has a past surgical history that includes Appendectomy and Carotid endarterectomy (Left, 2024).     24 1100   Restrictions/Precautions   Restrictions/Precautions Up Ad Trista   General   Diagnosis 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 with Right hemiparesis   Subjective   Subjective DME received and Rollator adjusted for height.   Balance   Standing Balance Independent   Standing Balance   Time 4 mins 45 secs   Activity static task without UE support   Functional Mobility   Functional - Mobility Device 4-Wheeled Walker   Assist Level Modified independent    Bed mobility   Rolling to Left Independent   Rolling to Right Independent   Supine to Sit Independent   Sit to Supine Independent   Bed Mobility Comments on mat table   Transfers   Sit to stand Independent   Stand to sit Independent   Toilet Transfers   Toilet - Technique Ambulating   Equipment Used Grab bars   Toilet Transfer Modified independent   OT Exercises   Motor Control/Coordination RUE   Activity Tolerance   Activity Tolerance Patient Tolerated treatment well   Assessment   Performance deficits / Impairments Decreased functional mobility ;Decreased strength;Decreased high-level IADLs   Assessment Now tolerating Rollator better   Treatment

## 2024-09-24 NOTE — PROGRESS NOTES
Occupational Therapy  Facility/Department: [facility] 8 REHAB UNIT  Rehabilitation Occupational Therapy Daily Treatment Note    Date: 24  Patient Name: Mary Lira       Room: 0817/817-02  MRN: [id number]  Account: [id number]   : 1975  (48 y.o.) Gender: female        Diagnosis: (P) 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 with Right hemiparesis       Treatment Diagnosis: (P) 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 with Right hemiparesis   Past Medical History:  has a past medical history of Anxiety, Apnea, CHF (congestive heart failure) (HCC), Diabetes mellitus (HCC), Hyperlipidemia, Hypertension, and Palliative care patient.  Past Surgical History:   has a past surgical history that includes Appendectomy and Carotid endarterectomy (Left, 2024).     24 0915   Restrictions/Precautions   Restrictions/Precautions Up Ad Trista   General   Diagnosis 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 with Right hemiparesis   Subjective   Subjective Reports pain has improved somewhat after PT adjustment yesterday. Was able to sleep better.   Transfers   Sit to stand Independent   Stand to sit Independent   OT Exercises   Exercise Treatment 2# Tbar, 13# Matias   Activity Tolerance   Activity Tolerance Patient Tolerated treatment well   Assessment   Performance deficits / Impairments Decreased functional mobility ;Decreased strength;Decreased high-level IADLs   Treatment Diagnosis 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 with Right hemiparesis   Time Code Minutes    Timed Code Treatment Minutes 45 Minutes   OT Individual Minutes   Time In 0915   Time Out 1000   Minutes 45

## 2024-09-24 NOTE — PROGRESS NOTES
Patient:   Mary Lira  MR#:    [id number]   Room:    0817/817-02   YOB: 1975  Date of Progress Note: 9/24/2024  Time of Note                           8:11 AM  Consulting Physician:   Dario Triplett M.D.  Attending Physician:  Dario Triplett MD       CHIEF COMPLAINT: Right-sided weakness     Subjective  This 48 y.o. female   with history anxiety, sleep apnea, CHF, DM, HLD, morbid obesity, and HTN. She presented to [facility] ER on 9/3/24 with c/o right-sided weakness, slurred speech, headache, shortness of breath, cough with productive green sputum for about 1 week, nausea and diarrhea. Work-up in ER  Leukocytosis, Hg 10.5, Glucose 142, CR 1.2 And BUN 21. roponin borderline elevated.  Respiratory panel negative.  CT brain did not report any acute intracranial abnormality CTA Head/Neck reported 90% stenosis left ICA, 60% right ICA and Chest xray reported opacity left upper lobe. She received IV Ceftriaxone and IV Azithromycin in ER. She was admitted to the Hospitalist service with consult for neurology and vascular surgery. She was seen by neurologist Dr. Diaz and felt to have an acute ischemic stroke, patient was outside of the window for TNK. MRI done showed MRI brain showed left hemispheric small ischemic infarcts. She was placed on aspirin/Plavix and statin therapy. She was then seen by vascular surgeon Dr. Eliezer Palencia to address her severe left carotid stenosis. Dr. Palencia recommended a left carotid endarterectomy. Cardiology was consulted for surgery clearance. She was seen by Dr. Lazo. Lexiscan done showed ejection fraction 73% normal perfusion study no evidence of ischemia.  Dr. Lazo okayed to proceed with vascular surgery. Patient was in agreement and went for surgery on 9/6/24. She tolerated the procedure well. Patient was evaluated by SPT patient obtained 24 out of 30 possible points, indicative of mild cognitive-linguistic impairment (patient was 2/3 recall, 0/3 multi-step  mobility , Decreased strength, Decreased high-level IADLs, Decreased endurance                     NUTRITION  Current Wt: Weight - Scale: 118.4 kg (261 lb 0.4 oz) / Body mass index is 46.24 kg/m².  Admission Wt: Admission Body Weight: 118 kg (260 lb 2.3 oz)  Oral Diet Orders:   Regular; 5 carb choices  Oral Nutrition Supplement (ONS) Orders:  none  Pt remains nutritionally stable. PO intake avg >75% of meals. Wt remains stable and consistent with admission wt. BG ranging 145-210mg/dL. No nutrition-related needs identified at this time. Continue POC. Please see nutrition note for details.  RECORD REVIEW: Previous medical records, medications were reviewed at today's visit    IMPRESSION:     1.  Left hemisphere stroke status post left carotid endarterectomy-aspirin/statin-PT/OT/SLP  2.  New onset A-fib/DVT prophylaxis-Eliquis, diltiazem, metoprolol  3.  Anxiety-BuSpar, trazodone at night.  Zoloft  4.  Hyperlipidemia-Lipitor  5.  CHF-Lasix  6.  Diabetes-insulin and monitoring.  Stable  7.  GI-bowel regimen  Continue Lidoderm and tizanidine for sciatica.  Continue gabapentin for sciatica.  Not helping but has a history of peripheral edema and hate to increase it.  negative lumbar x-ray. Exercises stressed.  Needs to lose wt.  Mag oxide helping leg cramps. Has longstanding peeling of right sole. Suggest her to see derm as opt.    Staffing this date , mutlidisciplinary with entire team with complex decision making and planning for discharge. More than 35 minutes spent today in total on this patient, with greater than 50% of the time on counseling and coordination of care  ELOS:9/26, thursday

## 2024-09-24 NOTE — PLAN OF CARE
Problem: Safety - Adult  Goal: Free from fall injury  9/23/2024 2204 by Susannah Evans RN  Outcome: Progressing  9/23/2024 1022 by Latoya Griffin RN  Outcome: Progressing     Problem: Skin/Tissue Integrity  Goal: Absence of new skin breakdown  Description: 1.  Monitor for areas of redness and/or skin breakdown  2.  Assess vascular access sites hourly  3.  Every 4-6 hours minimum:  Change oxygen saturation probe site  4.  Every 4-6 hours:  If on nasal continuous positive airway pressure, respiratory therapy assess nares and determine need for appliance change or resting period.  9/23/2024 2204 by Susannah Evans RN  Outcome: Progressing  9/23/2024 1022 by Latoya Griffin RN  Outcome: Progressing     Problem: ABCDS Injury Assessment  Goal: Absence of physical injury  9/23/2024 2204 by Susannah Evans RN  Outcome: Progressing  9/23/2024 1022 by Latoya Griffin RN  Outcome: Progressing     Problem: Discharge Planning  Goal: Discharge to home or other facility with appropriate resources  9/23/2024 2204 by Susannah Evans RN  Outcome: Progressing  Flowsheets (Taken 9/23/2024 1950)  Discharge to home or other facility with appropriate resources: Refer to discharge planning if patient needs post-hospital services based on physician order or complex needs related to functional status, cognitive ability or social support system  9/23/2024 1022 by Latoya Griffin RN  Outcome: Progressing     Problem: Pain  Goal: Verbalizes/displays adequate comfort level or baseline comfort level  9/23/2024 2204 by Susannah Evans RN  Outcome: Progressing  9/23/2024 1022 by Latoya Griffin RN  Outcome: Progressing     Problem: Chronic Conditions and Co-morbidities  Goal: Patient's chronic conditions and co-morbidity symptoms are monitored and maintained or improved  9/23/2024 2204 by Susannah Evans RN  Outcome: Progressing  Flowsheets (Taken 9/23/2024 1950)  Care Plan - Patient's Chronic Conditions and Co-Morbidity Symptoms are Monitored and Maintained or

## 2024-09-25 LAB
GLUCOSE BLD-MCNC: 125 MG/DL (ref 70–99)
GLUCOSE BLD-MCNC: 132 MG/DL (ref 70–99)
GLUCOSE BLD-MCNC: 186 MG/DL (ref 70–99)
GLUCOSE BLD-MCNC: 232 MG/DL (ref 70–99)
PERFORMED ON: ABNORMAL

## 2024-09-25 PROCEDURE — 82962 GLUCOSE BLOOD TEST: CPT

## 2024-09-25 PROCEDURE — 97530 THERAPEUTIC ACTIVITIES: CPT

## 2024-09-25 PROCEDURE — 97110 THERAPEUTIC EXERCISES: CPT

## 2024-09-25 PROCEDURE — 6370000000 HC RX 637 (ALT 250 FOR IP): Performed by: PSYCHIATRY & NEUROLOGY

## 2024-09-25 PROCEDURE — 97116 GAIT TRAINING THERAPY: CPT

## 2024-09-25 PROCEDURE — 1180000000 HC REHAB R&B

## 2024-09-25 PROCEDURE — 6370000000 HC RX 637 (ALT 250 FOR IP): Performed by: STUDENT IN AN ORGANIZED HEALTH CARE EDUCATION/TRAINING PROGRAM

## 2024-09-25 PROCEDURE — 99232 SBSQ HOSP IP/OBS MODERATE 35: CPT | Performed by: PSYCHIATRY & NEUROLOGY

## 2024-09-25 PROCEDURE — 94760 N-INVAS EAR/PLS OXIMETRY 1: CPT

## 2024-09-25 RX ADMIN — SERTRALINE HYDROCHLORIDE 100 MG: 100 TABLET ORAL at 08:01

## 2024-09-25 RX ADMIN — GABAPENTIN 400 MG: 400 CAPSULE ORAL at 08:01

## 2024-09-25 RX ADMIN — METOPROLOL SUCCINATE 50 MG: 50 TABLET, EXTENDED RELEASE ORAL at 08:01

## 2024-09-25 RX ADMIN — ATORVASTATIN CALCIUM 40 MG: 40 TABLET, FILM COATED ORAL at 21:26

## 2024-09-25 RX ADMIN — SENNOSIDES AND DOCUSATE SODIUM 2 TABLET: 50; 8.6 TABLET ORAL at 08:01

## 2024-09-25 RX ADMIN — APIXABAN 5 MG: 5 TABLET, FILM COATED ORAL at 08:01

## 2024-09-25 RX ADMIN — BUSPIRONE HYDROCHLORIDE 5 MG: 5 TABLET ORAL at 08:01

## 2024-09-25 RX ADMIN — Medication 400 MG: at 21:25

## 2024-09-25 RX ADMIN — HYDROCODONE BITARTRATE AND ACETAMINOPHEN 1 TABLET: 7.5; 325 TABLET ORAL at 17:55

## 2024-09-25 RX ADMIN — GABAPENTIN 400 MG: 400 CAPSULE ORAL at 13:37

## 2024-09-25 RX ADMIN — TIZANIDINE 4 MG: 4 TABLET ORAL at 21:25

## 2024-09-25 RX ADMIN — ASPIRIN 81 MG CHEWABLE TABLET 81 MG: 81 TABLET CHEWABLE at 08:01

## 2024-09-25 RX ADMIN — DILTIAZEM HYDROCHLORIDE 300 MG: 180 CAPSULE, COATED, EXTENDED RELEASE ORAL at 08:00

## 2024-09-25 RX ADMIN — HYDROCODONE BITARTRATE AND ACETAMINOPHEN 1 TABLET: 7.5; 325 TABLET ORAL at 08:00

## 2024-09-25 RX ADMIN — TIZANIDINE 4 MG: 4 TABLET ORAL at 13:37

## 2024-09-25 RX ADMIN — APIXABAN 5 MG: 5 TABLET, FILM COATED ORAL at 21:26

## 2024-09-25 RX ADMIN — INSULIN GLARGINE 30 UNITS: 100 INJECTION, SOLUTION SUBCUTANEOUS at 08:01

## 2024-09-25 RX ADMIN — INSULIN LISPRO 3 UNITS: 100 INJECTION, SOLUTION INTRAVENOUS; SUBCUTANEOUS at 17:55

## 2024-09-25 RX ADMIN — INSULIN LISPRO 1 UNITS: 100 INJECTION, SOLUTION INTRAVENOUS; SUBCUTANEOUS at 11:53

## 2024-09-25 RX ADMIN — HYDROCODONE BITARTRATE AND ACETAMINOPHEN 1 TABLET: 7.5; 325 TABLET ORAL at 22:00

## 2024-09-25 RX ADMIN — TIZANIDINE 4 MG: 4 TABLET ORAL at 08:00

## 2024-09-25 RX ADMIN — MICONAZOLE NITRATE: 2 POWDER TOPICAL at 11:12

## 2024-09-25 RX ADMIN — INSULIN LISPRO 3 UNITS: 100 INJECTION, SOLUTION INTRAVENOUS; SUBCUTANEOUS at 11:53

## 2024-09-25 RX ADMIN — FUROSEMIDE 20 MG: 20 TABLET ORAL at 08:01

## 2024-09-25 RX ADMIN — GABAPENTIN 400 MG: 400 CAPSULE ORAL at 21:25

## 2024-09-25 RX ADMIN — TRAZODONE HYDROCHLORIDE 100 MG: 100 TABLET ORAL at 21:25

## 2024-09-25 RX ADMIN — HYDROCODONE BITARTRATE AND ACETAMINOPHEN 1 TABLET: 7.5; 325 TABLET ORAL at 13:36

## 2024-09-25 RX ADMIN — HYDROCODONE BITARTRATE AND ACETAMINOPHEN 1 TABLET: 7.5; 325 TABLET ORAL at 04:35

## 2024-09-25 RX ADMIN — INSULIN LISPRO 3 UNITS: 100 INJECTION, SOLUTION INTRAVENOUS; SUBCUTANEOUS at 08:11

## 2024-09-25 ASSESSMENT — PAIN SCALES - GENERAL
PAINLEVEL_OUTOF10: 5
PAINLEVEL_OUTOF10: 5
PAINLEVEL_OUTOF10: 0
PAINLEVEL_OUTOF10: 4
PAINLEVEL_OUTOF10: 7
PAINLEVEL_OUTOF10: 2
PAINLEVEL_OUTOF10: 0
PAINLEVEL_OUTOF10: 4

## 2024-09-25 ASSESSMENT — PAIN DESCRIPTION - LOCATION
LOCATION: BACK;HIP
LOCATION: HIP;BACK
LOCATION: GENERALIZED
LOCATION: GENERALIZED;BACK
LOCATION: BACK
LOCATION: BACK

## 2024-09-25 ASSESSMENT — PAIN - FUNCTIONAL ASSESSMENT: PAIN_FUNCTIONAL_ASSESSMENT: ACTIVITIES ARE NOT PREVENTED

## 2024-09-25 ASSESSMENT — PAIN DESCRIPTION - DESCRIPTORS
DESCRIPTORS: ACHING
DESCRIPTORS: DISCOMFORT
DESCRIPTORS: ACHING

## 2024-09-25 ASSESSMENT — PAIN DESCRIPTION - ORIENTATION: ORIENTATION: RIGHT

## 2024-09-25 NOTE — PROGRESS NOTES
[facility] ACUTE INPATIENT REHABILITATION  TEAM CONFERENCE NOTE    Date: 2024  Patient Name: Mary Lira        MRN: [id number]    : 1975  (48 y.o.)  Gender: female   Referring Practitioner: Sandeep Diaz MD, Howie Matute MD  Diagnosis: acute ischemic stroke, R side weakness, dysarthria, leukocytosis, PNA      PHYSICAL THERAPY      SPEECH THERAPY      OCCUPATIONAL THERAPY      NUTRITION  Current Wt: Weight - Scale: 118.4 kg (261 lb 0.4 oz) / Body mass index is 46.24 kg/m².  Admission Wt: Admission Body Weight: 118 kg (260 lb 2.3 oz)  Oral Diet Orders:     Oral Nutrition Supplement (ONS) Orders:    Please see nutrition note for details.      NURSING    Past Medical History:        Diagnosis Date    Anxiety     Apnea     CHF (congestive heart failure) (HCC)     Diabetes mellitus (HCC)     Hyperlipidemia     Hypertension     Palliative care patient 2024       Vitals:    24 0759 24 1220 24 1533 24 1837   BP: (!) 150/70  (!) 110/54    Pulse: 74  55    Resp: 20 18 16 16   Temp:   97.2 °F (36.2 °C)    TempSrc:       SpO2:   98%    Weight:       Height:            SpO2: 98 %    On Room Air    Wounds/Incisions/Ulcers:  R foot dry skin. Left carotid incision ALONSO with distal drain hole/Mepilex.      Daryl Scale Score: 19    Pain: Patient's pain is currently controlled with Norco prn.     Consultations/Labs/X-rays: Xray lumbar (-).     Family Education: Family available and participating in education    Fall Risk:  Louis Myers Total Score: 60    Fall in the last week? No    Sleep Concerns: \"No concerns to address    Last BM: Last BM (including prior to admit): 24    Other Nursing Issues: Indep transfers. Eliquis, ASA. Accalyson allend. Cont B/B.         SOCIAL WORK/CASE MANAGEMENT  Assessment:    Discharge Plan   Estimated Length of Stay: {TIME; DAYS WEEKS MONTHS:}  Destination: {Settin}    Pass: {YES/NO:56726}    Services at Discharge: {FOLLOW-UP  SERVICES:23453}    Equipment at Discharge: {EQUIPMENT:771233936}    Progress made in the prior week:  1.  2.  3.  4.  5.      Goals for following week:  1.  2.   3.   4.   5.     Factors facilitating achievement of predicted outcomes: {Patient Strengths:235290851}    Barriers to the achievement of predicted outcomes: {BARRIERS:751097245}    Team Members Present at Conference:  Medical Director (Team Conference Leader): {ambiguous abbreviation:7321909::\"Sandeep Diaz MD\",\"Dario Triplett MD\"}  : {ambiguous abbreviation:3641935::\"Niyah Meyers, CSW\",\"Annie Samuels, MSW\"}  Occupational Therapist: {ambiguous abbreviation:3568039::\"Macarena Bell, OTR/L\",\"Tawny Stacy OTR/L\",\" Annel Torres, OTR/L\"}  Physical Therapist: {ambiguous abbreviation:6709393::\"Jayce Haskins PT,DPT\",\"Manas Lyn PT\"}  Speech Therapist: {ambiguous abbreviation:5235794::\"N/A\",\"Anjelica Arce MS, CCC-SLP\"}  Nurse: {ambiguous abbreviation:9930626::\"Rene Jimenez,RN\",\"Renee Dupree, RN,BSN\",\"Jacey Gonzalez, RN\",\"Stella Asher LPN\",\"Jocelyn Dos Santos, RN\",\"Raza Lira, RN\",\"Zonia Lyn, RN\",\"Latoya Griffin, RN\"}   Nurse Manager:  Renee Dupree, RN, BSN  Dietitian:  {ambiguous abbreviation:5262325::\"Meghana Stokes, MS, LD, RD\",\"Kenrick Baker, MS, LD, RD\",\"Beatris Espinosa, MS, LD, RD\"}      As the Medical Director:  I was physically present and lead the team conference discussion, and I approve the established interdisciplinary plan of care   as documented within the medical record of Mary Lira.

## 2024-09-25 NOTE — PROGRESS NOTES
Name: Mary Lira  MRN: [id number]  Date of Service:  9/25/2024 09/25/24 1300   Restrictions/Precautions   Restrictions/Precautions Up Ad Trista   Required Braces or Orthoses? No   Lower Extremity Weight Bearing Restrictions   Right Lower Extremity Weight Bearing Weight Bearing As Tolerated   General   Chart Reviewed Yes   Patient assessed for rehabilitation services? Yes   Additional Pertinent Hx pneumonia, lipoprotein metabolism disorder unspecified, DM 2, anxiety disorder, obesity, obstructive sleep apnea, heart failure   Family / Caregiver Present No   Diagnosis acute ischemic stroke, R side weakness, dysarthria, leukocytosis, PNA   Follows Commands WFL   Subjective   Subjective Pt sitting in reclner, agrees to participate in therapy.   Subjective   Subjective General   Pain Faces: 1-2/10   Transfers   Sit to Stand Independent   Stand to Sit Independent   Ambulation   Surface Level tile   Device Rollator   Assistance Modified Independent;Independent   Quality of Gait reciprocal, no LOB, slight FFP   Gait Deviations Decreased step length;Decreased step height   Distance 20' X 2   Comments Multiple L/R turns   PT Exercises   Exercise Treatment Pt able to stand at hallway handrail w/ BUE support and performed aspects of standing ther-ex HEP   Activity Tolerance   Activity Tolerance Patient tolerated treatment well   Assessment   Assessment Pt continues to be able to amb. functional distances w/ rollator w/ MI/[place] and tolerate overall tx w/o c/o of increased pain or increased facial grimacing. Pt reports that she feels as if she is well prepared for d/c tomorrow and that nothing further needs to be discussed regarding PT.   Discharge Recommendations Continue to assess pending progress;Home with Home health PT;Home with assist PRN   Education   Education Given To Patient   Education Provided Home Exercise Program   Education Provided Comments Standing BLE ther-ex HEP   Education Method Verbal;Printed  Information/Hand-outs   Barriers to Learning None   Education Outcome Verbalized understanding;Demonstrated understanding   Safety Devices   Type of Devices Gait belt;Left in chair;Call light within reach         Electronically signed by Mami Castro PTA on 9/25/2024 at 1:42 PM

## 2024-09-25 NOTE — PLAN OF CARE
Problem: Safety - Adult  Goal: Free from fall injury  Outcome: Progressing     Problem: Skin/Tissue Integrity  Goal: Absence of new skin breakdown  Description: 1.  Monitor for areas of redness and/or skin breakdown  2.  Assess vascular access sites hourly  3.  Every 4-6 hours minimum:  Change oxygen saturation probe site  4.  Every 4-6 hours:  If on nasal continuous positive airway pressure, respiratory therapy assess nares and determine need for appliance change or resting period.  Outcome: Progressing     Problem: ABCDS Injury Assessment  Goal: Absence of physical injury  Outcome: Progressing     Problem: Discharge Planning  Goal: Discharge to home or other facility with appropriate resources  Outcome: Progressing     Problem: Pain  Goal: Verbalizes/displays adequate comfort level or baseline comfort level  Outcome: Progressing     Problem: Chronic Conditions and Co-morbidities  Goal: Patient's chronic conditions and co-morbidity symptoms are monitored and maintained or improved  Outcome: Progressing     Problem: Neurosensory - Adult  Goal: Achieves stable or improved neurological status  Outcome: Progressing  Goal: Achieves maximal functionality and self care  Outcome: Progressing     Problem: Respiratory - Adult  Goal: Achieves optimal ventilation and oxygenation  Outcome: Progressing     Problem: Cardiovascular - Adult  Goal: Maintains optimal cardiac output and hemodynamic stability  Outcome: Progressing     Problem: Skin/Tissue Integrity - Adult  Goal: Skin integrity remains intact  Outcome: Progressing  Flowsheets (Taken 9/25/2024 1027)  Skin Integrity Remains Intact: Monitor for areas of redness and/or skin breakdown  Goal: Incisions, wounds, or drain sites healing without S/S of infection  Outcome: Progressing  Flowsheets (Taken 9/25/2024 1027)  Incisions, Wounds, or Drain Sites Healing Without Sign and Symptoms of Infection: TWICE DAILY: Assess and document skin integrity  Goal: Oral mucous membranes  relaxation techniques, as appropriate  5. Assess for spiritual pain/suffering and initiate Spiritual Care, Psychosocial Clinical Specialist consults as needed  Outcome: Progressing     Problem: Change in Body Image  Goal: Pt/Family communicate acceptance of loss or change in body image and feel psychological comfort and peace  Description: INTERVENTIONS:  1. Assess patient/family anxiety and grief process related to change in body image, loss of functional status, loss of sense of self, and forgiveness  2. Provide emotional and spiritual support  3. Provide information about the patient's health status with consideration of family and cultural values  4. Communicate willingness to discuss loss and facilitate grief process with patient/family as appropriate  5. Emphasize sustaining relationships within family system and community, or jeramy/spiritual traditions  6. Initiate Spiritual Care, Psychosocial Clinical Specialist consult as needed  Outcome: Progressing     Problem: Decision Making  Goal: Pt/Family able to effectively weigh alternatives and participate in decision making related to treatment and care  Description: INTERVENTIONS:  1. Determine when there are differences between patient's view, family's view, and healthcare provider's view of condition  2. Facilitate patient and family articulation of goals for care  3. Help patient and family identify pros/cons of alternative solutions  4. Provide information as requested by patient/family  5. Respect patient/family right to receive or not to receive information  6. Serve as a liaison between patient and family and health care team  7. Initiate Consults from Ethics, Palliative Care or initiate Family Care Conference as is appropriate  Outcome: Progressing

## 2024-09-25 NOTE — PLAN OF CARE
Problem: Safety - Adult  Goal: Free from fall injury  9/24/2024 1913 by Med Ruvalcaba, RN  Outcome: Progressing  9/24/2024 1053 by Jocelyn Dos Santos, RN  Outcome: Progressing

## 2024-09-25 NOTE — PROGRESS NOTES
Name: Mary Lira  MRN: [id number]  Date of Service:  9/25/2024 09/25/24 0815   Restrictions/Precautions   Restrictions/Precautions Up Ad Trista   Lower Extremity Weight Bearing Restrictions   Right Lower Extremity Weight Bearing Weight Bearing As Tolerated   General   Chart Reviewed Yes   Patient assessed for rehabilitation services? Yes   Additional Pertinent Hx pneumonia, lipoprotein metabolism disorder unspecified, DM 2, anxiety disorder, obesity, obstructive sleep apnea, heart failure   Family / Caregiver Present No   Diagnosis acute ischemic stroke, R side weakness, dysarthria, leukocytosis, PNA   Follows Commands WFL   Subjective   Subjective Pt in BR, agrees to participate in therapy once she is finished.   Transfers   Sit to Stand Modified independent;Independent   Stand to Sit Modified independent;Independent   Car Transfer Supervision;Modified independent   Ambulation   Surface Level tile   Device Rollator   Assistance Modified Independent   Quality of Gait reciprocal, no LOB, slight FFP   Gait Deviations Decreased step length;Decreased step height   Distance 20', 5' X 2   Stairs/Curb   Stairs? Yes   Stairs   # Steps  4   Stairs Height 4\"   Rails Right ascending   Assistance Contact guard assistance;Minimal assistance  (CGA during asc/Min A during desc)   Comment step to step pattern, asc/desc sideways using only R asc handrail   Wheelchair Activities   Propulsion Yes   Propulsion 1   Propulsion Manual   Level Level Tile   Method RUE;LUE   Level of Assistance Independent;Modified independent   Description/ Details Multiple L/R turns   Distance 190'   Activity Tolerance   Activity Tolerance Patient tolerated treatment well   Assessment   Assessment Pt able to amb. functional distances w/ rollator w/ MI and perform a car tf requiring Supervision/MI and asc/sdesc 4 4\"stairs sideways using only R handrail requiring CGA/Min A (no stairs where she is initially d/c, only has the one handrail when she

## 2024-09-25 NOTE — PROGRESS NOTES
Patient:   Mary Lira  MR#:    [id number]   Room:    0817/817-02   YOB: 1975  Date of Progress Note: 9/25/2024  Time of Note                           8:27 AM  Consulting Physician:   Dario Triplett M.D.  Attending Physician:  Dario Triplett MD       CHIEF COMPLAINT: Right-sided weakness     Subjective  This 48 y.o. female   with history anxiety, sleep apnea, CHF, DM, HLD, morbid obesity, and HTN. She presented to [facility] ER on 9/3/24 with c/o right-sided weakness, slurred speech, headache, shortness of breath, cough with productive green sputum for about 1 week, nausea and diarrhea. Work-up in ER  Leukocytosis, Hg 10.5, Glucose 142, CR 1.2 And BUN 21. roponin borderline elevated.  Respiratory panel negative.  CT brain did not report any acute intracranial abnormality CTA Head/Neck reported 90% stenosis left ICA, 60% right ICA and Chest xray reported opacity left upper lobe. She received IV Ceftriaxone and IV Azithromycin in ER. She was admitted to the Hospitalist service with consult for neurology and vascular surgery. She was seen by neurologist Dr. Diaz and felt to have an acute ischemic stroke, patient was outside of the window for TNK. MRI done showed MRI brain showed left hemispheric small ischemic infarcts. She was placed on aspirin/Plavix and statin therapy. She was then seen by vascular surgeon Dr. Eliezer Palencia to address her severe left carotid stenosis. Dr. Palencia recommended a left carotid endarterectomy. Cardiology was consulted for surgery clearance. She was seen by Dr. Lazo. Lexiscan done showed ejection fraction 73% normal perfusion study no evidence of ischemia.  Dr. Lazo okayed to proceed with vascular surgery. Patient was in agreement and went for surgery on 9/6/24. She tolerated the procedure well. Patient was evaluated by SPT patient obtained 24 out of 30 possible points, indicative of mild cognitive-linguistic impairment (patient was 2/3 recall, 0/3 multi-step

## 2024-09-26 VITALS
HEART RATE: 64 BPM | HEIGHT: 63 IN | RESPIRATION RATE: 18 BRPM | TEMPERATURE: 98.1 F | SYSTOLIC BLOOD PRESSURE: 107 MMHG | DIASTOLIC BLOOD PRESSURE: 53 MMHG | BODY MASS INDEX: 46.25 KG/M2 | OXYGEN SATURATION: 99 % | WEIGHT: 261.02 LBS

## 2024-09-26 LAB
ANION GAP SERPL CALCULATED.3IONS-SCNC: 11 MMOL/L (ref 7–19)
BASOPHILS # BLD: 0.1 K/UL (ref 0–0.2)
BASOPHILS NFR BLD: 0.7 % (ref 0–1)
BUN SERPL-MCNC: 26 MG/DL (ref 6–20)
CALCIUM SERPL-MCNC: 8.9 MG/DL (ref 8.6–10)
CHLORIDE SERPL-SCNC: 99 MMOL/L (ref 98–111)
CO2 SERPL-SCNC: 26 MMOL/L (ref 22–29)
CREAT SERPL-MCNC: 0.7 MG/DL (ref 0.5–0.9)
EOSINOPHIL # BLD: 1.2 K/UL (ref 0–0.6)
EOSINOPHIL NFR BLD: 11.7 % (ref 0–5)
ERYTHROCYTE [DISTWIDTH] IN BLOOD BY AUTOMATED COUNT: 14.6 % (ref 11.5–14.5)
GLUCOSE BLD-MCNC: 126 MG/DL (ref 70–99)
GLUCOSE BLD-MCNC: 170 MG/DL (ref 70–99)
GLUCOSE SERPL-MCNC: 99 MG/DL (ref 70–99)
HCT VFR BLD AUTO: 34.3 % (ref 37–47)
HGB BLD-MCNC: 9.7 G/DL (ref 12–16)
HYPOCHROMIA BLD QL SMEAR: ABNORMAL
IMM GRANULOCYTES # BLD: 0 K/UL
LYMPHOCYTES # BLD: 3 K/UL (ref 1.1–4.5)
LYMPHOCYTES NFR BLD: 30.3 % (ref 20–40)
MCH RBC QN AUTO: 23.8 PG (ref 27–31)
MCHC RBC AUTO-ENTMCNC: 28.3 G/DL (ref 33–37)
MCV RBC AUTO: 84.3 FL (ref 81–99)
MONOCYTES # BLD: 0.7 K/UL (ref 0–0.9)
MONOCYTES NFR BLD: 7.2 % (ref 0–10)
NEUTROPHILS # BLD: 4.9 K/UL (ref 1.5–7.5)
NEUTS SEG NFR BLD: 49.8 % (ref 50–65)
PERFORMED ON: ABNORMAL
PERFORMED ON: ABNORMAL
PLATELET # BLD AUTO: 388 K/UL (ref 130–400)
PLATELET SLIDE REVIEW: ADEQUATE
PMV BLD AUTO: 10.7 FL (ref 9.4–12.3)
POTASSIUM SERPL-SCNC: 4.6 MMOL/L (ref 3.5–5)
RBC # BLD AUTO: 4.07 M/UL (ref 4.2–5.4)
SODIUM SERPL-SCNC: 136 MMOL/L (ref 136–145)
WBC # BLD AUTO: 9.8 K/UL (ref 4.8–10.8)

## 2024-09-26 PROCEDURE — 6370000000 HC RX 637 (ALT 250 FOR IP): Performed by: PSYCHIATRY & NEUROLOGY

## 2024-09-26 PROCEDURE — 36415 COLL VENOUS BLD VENIPUNCTURE: CPT

## 2024-09-26 PROCEDURE — 6370000000 HC RX 637 (ALT 250 FOR IP): Performed by: STUDENT IN AN ORGANIZED HEALTH CARE EDUCATION/TRAINING PROGRAM

## 2024-09-26 PROCEDURE — 99239 HOSP IP/OBS DSCHRG MGMT >30: CPT | Performed by: PSYCHIATRY & NEUROLOGY

## 2024-09-26 PROCEDURE — 82962 GLUCOSE BLOOD TEST: CPT

## 2024-09-26 PROCEDURE — 80048 BASIC METABOLIC PNL TOTAL CA: CPT

## 2024-09-26 PROCEDURE — 94760 N-INVAS EAR/PLS OXIMETRY 1: CPT

## 2024-09-26 PROCEDURE — 85025 COMPLETE CBC W/AUTO DIFF WBC: CPT

## 2024-09-26 RX ORDER — HYDROCODONE BITARTRATE AND ACETAMINOPHEN 7.5; 325 MG/1; MG/1
1 TABLET ORAL EVERY 8 HOURS PRN
Qty: 42 TABLET | Refills: 0 | Status: SHIPPED | OUTPATIENT
Start: 2024-09-26 | End: 2024-10-10

## 2024-09-26 RX ORDER — LANOLIN ALCOHOL/MO/W.PET/CERES
400 CREAM (GRAM) TOPICAL NIGHTLY
Qty: 30 TABLET | Refills: 0 | Status: SHIPPED | OUTPATIENT
Start: 2024-09-26

## 2024-09-26 RX ORDER — DILTIAZEM HYDROCHLORIDE 300 MG/1
300 CAPSULE, COATED, EXTENDED RELEASE ORAL DAILY
Qty: 30 CAPSULE | Refills: 3 | Status: SHIPPED | OUTPATIENT
Start: 2024-09-26

## 2024-09-26 RX ORDER — INSULIN LISPRO 100 [IU]/ML
3 INJECTION, SOLUTION INTRAVENOUS; SUBCUTANEOUS
Qty: 1 EACH | Refills: 0 | Status: SHIPPED | OUTPATIENT
Start: 2024-09-26

## 2024-09-26 RX ORDER — ATORVASTATIN CALCIUM 40 MG/1
40 TABLET, FILM COATED ORAL NIGHTLY
Qty: 30 TABLET | Refills: 3 | Status: SHIPPED | OUTPATIENT
Start: 2024-09-26

## 2024-09-26 RX ORDER — INSULIN GLARGINE 100 [IU]/ML
30 INJECTION, SOLUTION SUBCUTANEOUS DAILY
Qty: 10 ML | Refills: 3 | Status: SHIPPED | OUTPATIENT
Start: 2024-09-26

## 2024-09-26 RX ORDER — BUSPIRONE HYDROCHLORIDE 5 MG/1
5 TABLET ORAL DAILY
Qty: 30 TABLET | Refills: 0 | Status: SHIPPED | OUTPATIENT
Start: 2024-09-26

## 2024-09-26 RX ORDER — GABAPENTIN 400 MG/1
400 CAPSULE ORAL 3 TIMES DAILY
Qty: 90 CAPSULE | Refills: 0 | Status: SHIPPED | OUTPATIENT
Start: 2024-09-26 | End: 2024-10-26

## 2024-09-26 RX ORDER — LIDOCAINE 4 G/G
1 PATCH TOPICAL EVERY 24 HOURS
Qty: 30 PATCH | Refills: 0 | Status: SHIPPED | OUTPATIENT
Start: 2024-09-26

## 2024-09-26 RX ORDER — METOPROLOL SUCCINATE 50 MG/1
50 TABLET, EXTENDED RELEASE ORAL DAILY
Qty: 30 TABLET | Refills: 3 | Status: SHIPPED | OUTPATIENT
Start: 2024-09-26

## 2024-09-26 RX ORDER — FUROSEMIDE 20 MG
20 TABLET ORAL DAILY
Qty: 60 TABLET | Refills: 3 | Status: SHIPPED | OUTPATIENT
Start: 2024-09-26

## 2024-09-26 RX ADMIN — ASPIRIN 81 MG CHEWABLE TABLET 81 MG: 81 TABLET CHEWABLE at 08:14

## 2024-09-26 RX ADMIN — TIZANIDINE 4 MG: 4 TABLET ORAL at 14:15

## 2024-09-26 RX ADMIN — BUSPIRONE HYDROCHLORIDE 5 MG: 5 TABLET ORAL at 08:14

## 2024-09-26 RX ADMIN — SENNOSIDES AND DOCUSATE SODIUM 2 TABLET: 50; 8.6 TABLET ORAL at 08:14

## 2024-09-26 RX ADMIN — GABAPENTIN 400 MG: 400 CAPSULE ORAL at 14:15

## 2024-09-26 RX ADMIN — TIZANIDINE 4 MG: 4 TABLET ORAL at 08:14

## 2024-09-26 RX ADMIN — INSULIN LISPRO 3 UNITS: 100 INJECTION, SOLUTION INTRAVENOUS; SUBCUTANEOUS at 11:35

## 2024-09-26 RX ADMIN — HYDROCODONE BITARTRATE AND ACETAMINOPHEN 1 TABLET: 7.5; 325 TABLET ORAL at 08:54

## 2024-09-26 RX ADMIN — SERTRALINE HYDROCHLORIDE 100 MG: 100 TABLET ORAL at 08:14

## 2024-09-26 RX ADMIN — HYDROCODONE BITARTRATE AND ACETAMINOPHEN 1 TABLET: 7.5; 325 TABLET ORAL at 14:18

## 2024-09-26 RX ADMIN — INSULIN LISPRO 3 UNITS: 100 INJECTION, SOLUTION INTRAVENOUS; SUBCUTANEOUS at 08:16

## 2024-09-26 RX ADMIN — FUROSEMIDE 20 MG: 20 TABLET ORAL at 08:14

## 2024-09-26 RX ADMIN — INSULIN GLARGINE 30 UNITS: 100 INJECTION, SOLUTION SUBCUTANEOUS at 08:15

## 2024-09-26 RX ADMIN — MICONAZOLE NITRATE: 2 POWDER TOPICAL at 08:22

## 2024-09-26 RX ADMIN — GABAPENTIN 400 MG: 400 CAPSULE ORAL at 08:14

## 2024-09-26 RX ADMIN — APIXABAN 5 MG: 5 TABLET, FILM COATED ORAL at 08:14

## 2024-09-26 RX ADMIN — METOPROLOL SUCCINATE 50 MG: 50 TABLET, EXTENDED RELEASE ORAL at 08:14

## 2024-09-26 RX ADMIN — HYDROCODONE BITARTRATE AND ACETAMINOPHEN 1 TABLET: 7.5; 325 TABLET ORAL at 04:45

## 2024-09-26 RX ADMIN — DILTIAZEM HYDROCHLORIDE 300 MG: 180 CAPSULE, COATED, EXTENDED RELEASE ORAL at 08:14

## 2024-09-26 ASSESSMENT — PAIN DESCRIPTION - ORIENTATION
ORIENTATION: RIGHT

## 2024-09-26 ASSESSMENT — PAIN DESCRIPTION - DESCRIPTORS
DESCRIPTORS: ACHING;SORE;DULL;DISCOMFORT
DESCRIPTORS: DISCOMFORT;ACHING
DESCRIPTORS: ACHING;DISCOMFORT

## 2024-09-26 ASSESSMENT — PAIN SCALES - GENERAL
PAINLEVEL_OUTOF10: 0
PAINLEVEL_OUTOF10: 6
PAINLEVEL_OUTOF10: 5

## 2024-09-26 ASSESSMENT — PAIN DESCRIPTION - LOCATION
LOCATION: BACK;HIP
LOCATION: BACK;HIP
LOCATION: HIP;BACK

## 2024-09-26 NOTE — PROGRESS NOTES
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  Past Surgical History:   has a past surgical history that includes Appendectomy and Carotid endarterectomy (Left, 2024).     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   Balance   Sitting Balance Independent   Standing Balance Independent   Standing Balance   Time 5 mins   Activity 2 hand static act   Functional Mobility   Functional - Mobility Device 4-Wheeled Walker   Activity Retrieve items;Transport items;Other   Assist Level Modified independent    Functional Mobility Comments in pt room and OT then back to room   Transfers   Sit to stand Independent   Stand to sit Independent   OT Exercises   Exercise Treatment 2# Tbar ex's   Activity Tolerance   Activity Tolerance Patient Tolerated treatment well   Assessment   Performance deficits / Impairments Decreased functional mobility ;Decreased strength;Decreased high-level IADLs   Treatment Diagnosis 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

## 2024-09-26 NOTE — DISCHARGE INSTR - DIET

## 2024-09-26 NOTE — DISCHARGE SUMMARY
Occupational Therapy Discharge Summary         Date: 2024  Patient Name: Mary Lira        MRN: [id number]    : 1975  (48 y.o.)  Gender: female   Referring Practitioner: Sandeep Diaz MD, Howie Matute MD  Diagnosis: acute ischemic stroke, R side weakness, dysarthria, leukocytosis, PNA  Restrictions/Precautions  Restrictions/Precautions: Up Ad Trista  Required Braces or Orthoses?: No      Discharge Date:24      UE Functioning:  WFLs    Home Management:  Functional Mobility  Functional - Mobility Device: 4-Wheeled Walker  Activity: Retrieve items, Transport items, Other  Assist Level: Modified independent       Adaptive Equipment/DME Status:  Bathroom Equipment: None  Home Equipment: None  Arranged Rollator and BSC, pt to arrange TTB    Pain Assessment:   R hip/lower back   3-5/10    Remaining Problems:  Decreased functional mobility; Decreased endurance; Decreased high level IADLs    STGs:  Short Term Goals  Short Term Goal 1: patient will complete overall toileting with supervision  Short Term Goal 2: patient will complete LB dressing with supervision  Short Term Goal 3: patient will complete overall bathing with supervision  Short Term Goal 4: patient will complete ambulatory home making task with supervision  Short Term Goal 5: patient will complete 1-2 handed standing task for 5 mins with supervision  Short Term Goal 6: patient will complete HEP x 5 occasions to improve strength and endurance for ADLs  Met all STGs    LTGs:  Long Term Goals  Time Frame for Long Term Goals : 2 weeks  Long Term Goal 1: patient will complete overall dressing with modified independence  Long Term Goal 2: patient will complete overall toileting with modified independence  Long Term Goal 3: patient will complete overall bathing with modified independence  Long Term Goal 4: patient will complete HEP with independence  Long Term Goal 5: patient will complete ambulatory home making task with modified  independence  Met all LTGs    Discharge Setting and Recommendations:  Home with family support and HEP.     Discharge Care Scores    Eating: CARE Score: 6  Oral Hygiene: CARE Score: 6  Toileting: CARE Score: 6  Shower/Bathe: CARE Score: 6  Upper Body Dressing: CARE Score: 6  Lower Body Dressing: CARE Score: 6  Footwear: CARE Score: 6  Toilet Transfers: CARE Score: 6  Picking Up Object:  CARE Score: 6  Cognitive Patterns:  Cognitive Patterns  Cognitive Pattern Assessment Used: BIMS  Repetition of Three Words (First Attempt): 3  Temporal Orientation: Year: Correct  Temporal Orientation: Month: Accurate within 5 days  Temporal Orientation: Day: Correct  Able to recall \"sock”: Yes, no cue required  Able to recall \"blue\": Yes, no cue required  Able to recall \"bed\": Yes, no cue required  BIMS Summary Score: 15                 Confusion Assessment Method (CAM):  Confusion Assessment Method (CAM)  Is there evidence of an acute change in mental status from the patient's baseline?: No  Inattention: Behavior not present  Disorganized thinking: Behavior not present  Altered level of consciousness: Behavior not present  Health Literacy:  How often do you need to have someone help you when you read instructions, pamphlets, or other written material from your doctor or pharmacy?: Never    Electronically signed by ALONSO Caba on 9/26/24 at 11:30 AM CDT

## 2024-09-26 NOTE — PROGRESS NOTES
Plan discharge today. Patient will discharge home with a friend with assistance of her daughter.     Lorenza with [employer] aware of discharge today. Discharge summary fax: [phone].    Electronically signed by LAURA Burton on 9/26/24 at 7:40 AM CDT

## 2024-09-26 NOTE — DISCHARGE SUMMARY
Neurology Discharge Summary     Patient Identification:  Mary Lira is a 48 y.o. female.  :  1975  Admit Date:  2024  Discharge date : 24   Attending Provider: Dario Triplett MD     Account Number: [id number]                                   Admission Diagnoses:   Acute ischemic stroke (HCC) [I63.9]    Discharge Diagnoses:  Principal Problem:    Acute ischemic stroke (HCC)  Resolved Problems:    * No resolved hospital problems. *      Discharge Medications:    Current Discharge Medication List             Details   HYDROcodone-acetaminophen (NORCO) 7.5-325 MG per tablet Take 1 tablet by mouth every 8 hours as needed for Pain for up to 14 days. Max Daily Amount: 3 tablets  Qty: 42 tablet, Refills: 0    Comments: Reduce doses taken as pain becomes manageable  Associated Diagnoses: Acute ischemic stroke (HCC)      apixaban (ELIQUIS) 5 MG TABS tablet Take 1 tablet by mouth 2 times daily  Qty: 60 tablet, Refills: 0      insulin glargine (LANTUS) 100 UNIT/ML injection vial Inject 30 Units into the skin daily  Qty: 10 mL, Refills: 3      insulin lispro (HUMALOG,ADMELOG) 100 UNIT/ML SOLN injection vial Inject 3 Units into the skin 3 times daily (with meals)  Qty: 1 each, Refills: 0      metoprolol succinate (TOPROL XL) 50 MG extended release tablet Take 1 tablet by mouth daily  Qty: 30 tablet, Refills: 3      lidocaine 4 % external patch Place 1 patch onto the skin every 24 hours  Qty: 30 patch, Refills: 0      magnesium oxide (MAG-OX) 400 (240 Mg) MG tablet Take 1 tablet by mouth nightly  Qty: 30 tablet, Refills: 0                Details   busPIRone (BUSPAR) 5 MG tablet Take 1 tablet by mouth daily  Qty: 30 tablet, Refills: 0      gabapentin (NEURONTIN) 400 MG capsule Take 1 capsule by mouth 3 times daily for 30 days. Max Daily Amount: 1,200 mg  Qty: 90 capsule, Refills: 0    Associated Diagnoses: Acute ischemic stroke (HCC)      atorvastatin (LIPITOR) 40 MG tablet Take 1 tablet by mouth

## 2024-09-26 NOTE — PROGRESS NOTES
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  Past Surgical History:   has a past surgical history that includes Appendectomy and Carotid endarterectomy (Left, 2024).     24 1430   Restrictions/Precautions   Restrictions/Precautions Up Ad Trista   General   Additional Pertinent Hx New onset A-fib, anxiety, sleep apnea, CHF, DM, HLD, morbid obesity, and HTN   Diagnosis 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 with Right hemiparesis   Subjective   Subjective Tx focused on community mobility task   Functional Mobility   Functional - Mobility Device 4-Wheeled Walker   Assist Level Modified independent    OT Exercises   Resistive Exercises Min Resistance   Activity Tolerance   Activity Tolerance Patient Tolerated treatment well   Activity Tolerance Comments pain at end of tx   Assessment   Performance deficits / Impairments Decreased functional mobility ;Decreased high-level IADLs;Decreased endurance   Assessment Pt's hip pain has been better the last two days but during community mobility task of navigating through retail area pt began to experince increased pain again.   Treatment Diagnosis 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 with Right hemiparesis   Time Code Minutes    Timed Code Treatment Minutes 45 Minutes   OT Individual Minutes   Time In 1430   Time Out 0064

## 2024-09-26 NOTE — PROGRESS NOTES
09/26/24 1105   Encounter Summary   Encounter Overview/Reason Spiritual/Emotional Needs   Service Provided For Patient   Referral/Consult From Palliative Care   Support System Children;Family members   Complexity of Encounter Moderate   Begin Time 1045   End Time  1105   Total Time Calculated 20 min   Spiritual/Emotional needs   Type Spiritual Support   Palliative Care   Type Palliative Care, Follow-up   Assessment/Intervention/Outcome   Assessment Coping;Hopeful   Intervention Active listening;Prayer (assurance of)/[place]   Outcome Acceptance;Expressed Gratitude   Plan and Referrals   Plan/Referrals Continue to visit, (comment);Continue Support (comment)   Does the patient have a [facility] PCP? Yes    to follow up after discharge? No         This  visited with pt to provide spiritual care and support. Pt says she is going home today. Pt had a stroke and was in rehab and therapy. This  asked if she needed anything and she says she needed a coat. This  went to the clothes closet and found a coat in her size and a few non-perishable food items. Provided a coat, non-spiritual food items, spiritual care with sustaining presence, support, and prayer.       Spiritual Health History and Assessment/Progress Note  [facility]    (P) Spiritual/Emotional Needs,  ,  ,      Name: Mary Lira MRN: [id number]    Age: 48 y.o.     Sex: female   Language: English   Gnosticist: Mandaen   Acute ischemic stroke (HCC)     Date: 9/26/2024            Total Time Calculated: (P) 20 min              Spiritual Assessment continued in [facility] 8 REHAB UNIT        Referral/Consult From: (P) Palliative Care   Encounter Overview/Reason: (P) Spiritual/Emotional Needs  Service Provided For: (P) Patient    Kari, Belief, Meaning:   Patient identifies as spiritual and is connected with a kari tradition or spiritual practice  Family/Friends No family/friends present      Importance and Influence:  Patient has

## 2024-09-26 NOTE — PLAN OF CARE
Progressing  Flowsheets (Taken 9/25/2024 1916)  Electrolytes maintained within normal limits: Monitor labs and assess patient for signs and symptoms of electrolyte imbalances  9/25/2024 1027 by Jocelyn Dos Santos RN  Outcome: Progressing  Goal: Hemodynamic stability and optimal renal function maintained  9/25/2024 1924 by Jackie Berman RN  Outcome: Progressing  Flowsheets (Taken 9/25/2024 1916)  Hemodynamic stability and optimal renal function maintained:   Monitor labs and assess for signs and symptoms of volume excess or deficit   Encourage oral intake as appropriate  9/25/2024 1027 by Joceyln Dos Santos, RN  Outcome: Progressing  Goal: Glucose maintained within prescribed range  9/25/2024 1924 by Jackie Berman RN  Outcome: Progressing  Flowsheets (Taken 9/25/2024 1916)  Glucose maintained within prescribed range:   Monitor blood glucose as ordered   Assess for signs and symptoms of hyperglycemia and hypoglycemia   Administer ordered medications to maintain glucose within target range  9/25/2024 1027 by Jocelyn Dos Santos RN  Outcome: Progressing     Problem: Hematologic - Adult  Goal: Maintains hematologic stability  9/25/2024 1924 by Jackie Berman RN  Outcome: Progressing  Flowsheets (Taken 9/25/2024 1916)  Maintains hematologic stability:   Assess for signs and symptoms of bleeding or hemorrhage   Monitor labs for bleeding or clotting disorders  9/25/2024 1027 by Jocelyn Dos Santos RN  Outcome: Progressing     Problem: Anxiety  Goal: Will report anxiety at manageable levels  Description: INTERVENTIONS:  1. Administer medication as ordered  2. Teach and rehearse alternative coping skills  3. Provide emotional support with 1:1 interaction with staff  9/25/2024 1924 by Jackie Berman RN  Outcome: Progressing  9/25/2024 1027 by Jocelyn Dos Santos RN  Outcome: Progressing     Problem: Coping  Goal: Pt/Family able to verbalize concerns and demonstrate effective coping strategies  Description: INTERVENTIONS:  1. Assist  forgiveness  9/25/2024 1027 by Jocelyn Dos Santos, RN  Outcome: Progressing     Problem: Decision Making  Goal: Pt/Family able to effectively weigh alternatives and participate in decision making related to treatment and care  Description: INTERVENTIONS:  1. Determine when there are differences between patient's view, family's view, and healthcare provider's view of condition  2. Facilitate patient and family articulation of goals for care  3. Help patient and family identify pros/cons of alternative solutions  4. Provide information as requested by patient/family  5. Respect patient/family right to receive or not to receive information  6. Serve as a liaison between patient and family and health care team  7. Initiate Consults from Ethics, Palliative Care or initiate Family Care Conference as is appropriate  9/25/2024 1924 by Jackie Berman, RN  Outcome: Progressing  9/25/2024 1027 by Jocelyn Dos Santos, RN  Outcome: Progressing

## 2024-09-27 NOTE — DISCHARGE SUMMARY
Physical Therapy Discharge Note  DATE:  2024  NAME:  Mary Lira  :  1975  (48 y.o.,female)  MRN:  [id number]    HEIGHT:  Height: 160 cm (5' 3\")  WEIGHT:  Weight - Scale:  (in shower)    PATIENT DIAGNOSIS(ES):    Diagnosis: acute ischemic stroke, R side weakness, dysarthria, leukocytosis, PNA    Additional Pertinent Hx: pneumonia, lipoprotein metabolism disorder unspecified, DM 2, anxiety disorder, obesity, obstructive sleep apnea, heart failure  RESTRICTIONS/PRECAUTIONS:    Restrictions/Precautions  Restrictions/Precautions: Up Ad Trista  Required Braces or Orthoses?: No     OVERALL  ORIENTATION STATUS:  Overall Orientation Status: Within Functional Limits  PAIN:  Pain Level: 5  Pain Type: Acute pain    Pain Location: Back, Hip     Pain Orientation: Right      GROSS ASSESSMENT        POSTURE/BALANCE          ACTIVITY TOLERANCE  Activity Tolerance  Activity Tolerance: Patient tolerated treatment well      BED MOBILITY  Bed mobility  Rolling to Left: Independent  Rolling to Right: Independent  Supine to Sit: Independent  Sit to Supine: Independent  Scooting: Independent  Bed Mobility Comments: On R side of bed- no use of bedrail (Pt enters bed on L knee and rolls over)        TRANSFERS  Transfers  Sit to Stand: Independent  Stand to Sit: Independent  Bed to Chair: Modified independent (Personal rollator)  Stand Pivot Transfers: Stand by assistance  Lateral Transfers: Modified independent (Stand step with RW)  Car Transfer: Supervision, Modified independent  Comment: pt hard on herself in instances of forgetting to reach back for chair, but occasional and controlled       AMBULATION 1  Ambulation  Surface: Level tile  Device: Rollator  Other Apparatus: Wheelchair follow (due to previous low bp by nsg)  Assistance: Modified Independent, Independent  Quality of Gait: reciprocal, no LOB, slight FFP  Gait Deviations: Decreased step length, Decreased step height  Distance: 20' X 2  Comments: Multiple L/R  further needs to be discussed regarding PT.  Performance Deficits/Impairments: Decreased body mechanics, Decreased functional mobility , Decreased strength, Decreased endurance, Decreased balance, Decreased fine motor control  Treatment Diagnosis: interference with activitiees  Therapy Prognosis: Good  Decision Making: Low Complexity  History: acute ischemic stroke, R side weakness, dysarthria, leukocytosis, PNA, pneumonia, lipoprotein metabolism disorder unspecified, DM 2, anxiety disorder, obesity, obstructive sleep apnea, heart failure  Exam: Pt has general right sided weakness. Pt did well and showed improvement from where she was at in acute care.  Clinical Presentation: stable  Discharge Recommendations: Continue to assess pending progress, Home with Home health PT, Home with assist PRN    PLAN:  Discharge Recommendations: Home with HEP    PATIENT GOAL FOR REHAB:  RETURN TO PRIOR LEVEL OF FUNCTION       IRF/GENO  Roll Left and Right  Assistance Needed: Independent  CARE Score: 6  Discharge Goal: Independent    Sit to Lying  Assistance Needed: Independent  CARE Score: 6  Discharge Goal: Independent    Lying to Sitting on Side of Bed  Assistance Needed: Independent  CARE Score: 6  Discharge Goal: Independent    Sit to Stand  Assistance Needed: Independent  CARE Score: 6  Discharge Goal: Independent    Chair/Bed-to-Chair Transfer  Assistance Needed: Independent  CARE Score: 6  Discharge Goal: Independent    Car Transfer  Assistance Needed: Independent  Reason if not Attempted: Not attempted due to medical condition or safety concerns  CARE Score: 6  Discharge Goal: Not Attempted    Walk 10 Feet  Assistance Needed: Independent  CARE Score: 6  Discharge Goal: Independent    Walk 50 Feet with Two Turns  Assistance Needed: Supervision or touching assistance  CARE Score: 4  Discharge Goal: Independent    Walk 150 Feet  Assistance Needed: Supervision or touching assistance  Reason if not Attempted: Not attempted due to

## 2024-10-07 DIAGNOSIS — I63.9 ACUTE ISCHEMIC STROKE (HCC): ICD-10-CM

## 2024-10-07 NOTE — TELEPHONE ENCOUNTER
Requested Prescriptions     Pending Prescriptions Disp Refills    HYDROcodone-acetaminophen (NORCO) 7.5-325 MG per tablet 42 tablet 0     Sig: Take 1 tablet by mouth every 8 hours as needed for Pain for up to 14 days. Max Daily Amount: 3 tablets         Last Medication Refill:  9/26/2024 with 0 RF

## 2024-10-08 RX ORDER — HYDROCODONE BITARTRATE AND ACETAMINOPHEN 7.5; 325 MG/1; MG/1
1 TABLET ORAL EVERY 8 HOURS PRN
Qty: 42 TABLET | Refills: 0 | OUTPATIENT
Start: 2024-10-08 | End: 2024-10-22

## 2024-10-21 RX ORDER — INSULIN LISPRO 100 [IU]/ML
INJECTION, SOLUTION INTRAVENOUS; SUBCUTANEOUS
Qty: 10 ML | Refills: 0 | OUTPATIENT
Start: 2024-10-21

## 2024-10-21 RX ORDER — LIDOCAINE 4 G/100G
PATCH TOPICAL
Qty: 30 PATCH | Refills: 0 | OUTPATIENT
Start: 2024-10-21

## 2024-11-06 DIAGNOSIS — I63.9 ACUTE ISCHEMIC STROKE (HCC): ICD-10-CM

## 2024-11-06 NOTE — TELEPHONE ENCOUNTER
Pt  was prescribed medications by Dr Beard in hospital in September. Pt needing refills, and was advised by pcp to get them from [place]. All requests have been refused. Please call pt advise. Per pt out of medications.

## 2024-11-06 NOTE — TELEPHONE ENCOUNTER
Do you want to continue to rescribe gabapentin for this patient ? Has upcoming appointment in December

## 2024-11-07 RX ORDER — GABAPENTIN 400 MG/1
400 CAPSULE ORAL 3 TIMES DAILY
Qty: 90 CAPSULE | Refills: 3 | Status: SHIPPED | OUTPATIENT
Start: 2024-11-07 | End: 2024-12-07

## 2024-11-18 RX ORDER — MAGNESIUM OXIDE TAB 400 MG (241.3 MG ELEMENTAL MG) 400 (241.3 MG) MG
400 TAB ORAL NIGHTLY
Qty: 30 TABLET | Refills: 0 | OUTPATIENT
Start: 2024-11-18

## 2024-11-18 RX ORDER — MAGNESIUM OXIDE TAB 400 MG (241.3 MG ELEMENTAL MG) 400 (241.3 MG) MG
400 TAB ORAL NIGHTLY
Qty: 30 TABLET | Refills: 5 | Status: SHIPPED | OUTPATIENT
Start: 2024-11-18

## 2024-11-18 NOTE — TELEPHONE ENCOUNTER
Requested Prescriptions     Pending Prescriptions Disp Refills    MAGNESIUM-OXIDE 400 (240 Mg) MG tablet [Pharmacy Med Name: MAGnesium-Oxide 400 (241.3 Mg) MG Oral Tablet] 30 tablet 0     Sig: Take 1 tablet by mouth nightly       Last Office Visit: hospital pt  Next Office Visit: 12/30/2024  Last Medication Refill: 926/24

## 2024-12-16 NOTE — TELEPHONE ENCOUNTER
Requested Prescriptions     Pending Prescriptions Disp Refills    tiZANidine (ZANAFLEX) 4 MG tablet [Pharmacy Med Name: tiZANidine HCl 4 MG Oral Tablet] 90 tablet 0     Sig: TAKE 1 TABLET BY MOUTH THREE TIMES DAILY       Last Office Visit: Visit date not found  Next Office Visit: 12/30/2024  Last Medication Refill: 09/26/24 RF 0

## 2024-12-30 RX ORDER — INSULIN GLARGINE 100 [IU]/ML
INJECTION, SOLUTION SUBCUTANEOUS
Qty: 30 ML | Refills: 0 | OUTPATIENT
Start: 2024-12-30

## 2025-01-29 ENCOUNTER — TELEPHONE (OUTPATIENT)
Dept: HEMATOLOGY | Age: 50
End: 2025-01-29

## 2025-01-29 NOTE — TELEPHONE ENCOUNTER
Called Patient and reminded patient of their appointment on 02/03/2025 and patient confirmed they would be here. Reminded patient to just come at appointment time, and to not come at the lab appointment time. Reminded patient that we will not check them in any more than 30 minutes before appointment time.  We have now moved to the [facility] [place] that is located between our old office and the ER at the [facility]. Letting the Pt know that our front entrance faces the  [place]. Reminded pt to eat well and be well hydrated for their labs.

## 2025-01-31 DIAGNOSIS — R76.8 ELEVATED SERUM IMMUNOGLOBULIN FREE LIGHT CHAIN LEVEL: Primary | ICD-10-CM

## 2025-01-31 NOTE — PROGRESS NOTES
daily, Disp: 30 tablet, Rfl: 0    apixaban (ELIQUIS) 5 MG TABS tablet, Take 1 tablet by mouth 2 times daily, Disp: 60 tablet, Rfl: 0    insulin glargine (LANTUS) 100 UNIT/ML injection vial, Inject 30 Units into the skin daily (Patient taking differently: Inject 44 Units into the skin daily), Disp: 10 mL, Rfl: 3    insulin lispro (HUMALOG,ADMELOG) 100 UNIT/ML SOLN injection vial, Inject 3 Units into the skin 3 times daily (with meals), Disp: 1 each, Rfl: 0    atorvastatin (LIPITOR) 40 MG tablet, Take 1 tablet by mouth nightly, Disp: 30 tablet, Rfl: 3    metoprolol succinate (TOPROL XL) 50 MG extended release tablet, Take 1 tablet by mouth daily, Disp: 30 tablet, Rfl: 3    dilTIAZem (CARDIZEM CD) 300 MG extended release capsule, Take 1 capsule by mouth daily, Disp: 30 capsule, Rfl: 3    lidocaine 4 % external patch, Place 1 patch onto the skin every 24 hours, Disp: 30 patch, Rfl: 0    furosemide (LASIX) 20 MG tablet, Take 1 tablet by mouth daily, Disp: 60 tablet, Rfl: 3    albuterol (VOSPIRE ER) 4 MG extended release tablet, Take 1 tablet by mouth in the morning and 1 tablet in the evening., Disp: , Rfl:     fluticasone (FLOVENT HFA) 110 MCG/ACT inhaler, Inhale 1 puff into the lungs 2 times daily, Disp: , Rfl:     sertraline (ZOLOFT) 100 MG tablet, Take 1 tablet by mouth daily, Disp: , Rfl:     traZODone (DESYREL) 100 MG tablet, Take 1 tablet by mouth nightly, Disp: , Rfl:      Allergies   Allergen Reactions    Ibuprofen     Levaquin [Levofloxacin]     Sulfa Antibiotics     Toradol [Ketorolac Tromethamine]     Zomig [Zolmitriptan]        Social History     Tobacco Use    Smoking status: Never    Smokeless tobacco: Never   Vaping Use    Vaping status: Never Used   Substance Use Topics    Alcohol use: Not Currently    Drug use: Never       Family History   Problem Relation Age of Onset    Seizures Mother     Cancer Father     Stroke Father 40    Diabetes Sister     Diabetes Sister     Heart Attack Maternal Grandmother

## 2025-02-03 DIAGNOSIS — D72.829 LEUKOCYTOSIS, UNSPECIFIED TYPE: ICD-10-CM

## 2025-02-03 DIAGNOSIS — D75.839 THROMBOCYTOSIS: ICD-10-CM

## 2025-02-03 DIAGNOSIS — R76.8 ELEVATED SERUM IMMUNOGLOBULIN FREE LIGHT CHAINS: ICD-10-CM

## 2025-02-03 DIAGNOSIS — D50.9 MICROCYTIC ANEMIA: Primary | ICD-10-CM

## 2025-03-12 ENCOUNTER — TELEPHONE (OUTPATIENT)
Dept: HEMATOLOGY | Age: 50
End: 2025-03-12

## 2025-03-12 NOTE — TELEPHONE ENCOUNTER
I called patient and left detailed voicemail about their appointment on 03/17/2025. I made patient aware not to arrive any earlier than the appointment time and to come at the time of the follow up not the time of the lab appointment if it is different than the follow up appt time. I also made patient aware to eat and drink plenty of water to hydrate properly before coming to these appointments because this will make their lab draw much easier. Made patient aware that we are now located at the [facility] at [address] [place] [address]. Located between [facility] and the [facility]

## 2025-03-14 ENCOUNTER — TELEPHONE (OUTPATIENT)
Dept: HEMATOLOGY | Age: 50
End: 2025-03-14

## 2025-03-14 DIAGNOSIS — R76.8 ELEVATED SERUM IMMUNOGLOBULIN FREE LIGHT CHAINS: Primary | ICD-10-CM

## 2025-03-14 NOTE — TELEPHONE ENCOUNTER
I reached out to Dr Manley office regarding apt that Mary has not showed up for. They provided me with daughter number Georgia [phone] and Sister Anna [phone]. Unable to get a hold of Mary or anyone to verify apt. No VM.

## 2025-03-17 ENCOUNTER — OFFICE VISIT (OUTPATIENT)
Dept: HEMATOLOGY | Age: 50
End: 2025-03-17
Payer: MEDICAID

## 2025-03-17 ENCOUNTER — HOSPITAL ENCOUNTER (OUTPATIENT)
Dept: INFUSION THERAPY | Age: 50
Discharge: HOME | End: 2025-03-17
Payer: MEDICAID

## 2025-03-17 ENCOUNTER — RESULTS FOLLOW-UP (OUTPATIENT)
Dept: INFUSION THERAPY | Age: 50
End: 2025-03-17

## 2025-03-17 VITALS
DIASTOLIC BLOOD PRESSURE: 64 MMHG | SYSTOLIC BLOOD PRESSURE: 120 MMHG | OXYGEN SATURATION: 93 % | BODY MASS INDEX: 48.73 KG/M2 | WEIGHT: 275 LBS | TEMPERATURE: 97.6 F | HEIGHT: 63 IN | HEART RATE: 70 BPM

## 2025-03-17 DIAGNOSIS — Z78.9 NEED FOR FOLLOW-UP BY SOCIAL WORKER: ICD-10-CM

## 2025-03-17 DIAGNOSIS — D72.829 LEUKOCYTOSIS, UNSPECIFIED TYPE: ICD-10-CM

## 2025-03-17 DIAGNOSIS — R76.8 ELEVATED SERUM IMMUNOGLOBULIN FREE LIGHT CHAINS: ICD-10-CM

## 2025-03-17 DIAGNOSIS — D75.839 THROMBOCYTOSIS: ICD-10-CM

## 2025-03-17 DIAGNOSIS — D50.9 MICROCYTIC ANEMIA: ICD-10-CM

## 2025-03-17 DIAGNOSIS — R76.8 ELEVATED SERUM IMMUNOGLOBULIN FREE LIGHT CHAINS: Primary | ICD-10-CM

## 2025-03-17 LAB
BASOPHILS # BLD: 0.08 K/UL (ref 0–0.2)
BASOPHILS NFR BLD: 0.6 % (ref 0–1)
CRP SERPL-MCNC: 14.1 MG/L (ref 0–5)
EOSINOPHIL # BLD: 0.64 K/UL (ref 0–0.6)
EOSINOPHIL NFR BLD: 4.4 % (ref 0–5)
ERYTHROCYTE [DISTWIDTH] IN BLOOD BY AUTOMATED COUNT: 15.5 % (ref 11.5–14.5)
ERYTHROCYTE [SEDIMENTATION RATE] IN BLOOD BY WESTERGREN METHOD: 36 MM/HR (ref 0–25)
FERRITIN SERPL-MCNC: 22.8 NG/ML (ref 13–150)
FOLATE SERPL-MCNC: 7.9 NG/ML (ref 4.8–37.3)
HAPTOGLOB SERPL-MCNC: 246 MG/DL (ref 30–200)
HCT VFR BLD AUTO: 38.6 % (ref 37–47)
HGB BLD-MCNC: 12.1 G/DL (ref 12–16)
IRON SATN MFR SERPL: 11 % (ref 15–50)
IRON SERPL-MCNC: 45 UG/DL (ref 37–145)
LDH SERPL-CCNC: 169 U/L (ref 135–214)
LYMPHOCYTES # BLD: 3.13 K/UL (ref 1.1–4.5)
LYMPHOCYTES NFR BLD: 21.7 % (ref 20–40)
MCH RBC QN AUTO: 26.1 PG (ref 27–31)
MCHC RBC AUTO-ENTMCNC: 31.3 G/DL (ref 33–37)
MCV RBC AUTO: 83.4 FL (ref 81–99)
MONOCYTES # BLD: 0.71 K/UL (ref 0–0.9)
MONOCYTES NFR BLD: 4.9 % (ref 1–10)
NEUTROPHILS # BLD: 9.84 K/UL (ref 1.5–7.5)
NEUTS SEG NFR BLD: 68.1 % (ref 50–65)
PLATELET # BLD AUTO: 493 K/UL (ref 130–400)
PMV BLD AUTO: 10.1 FL (ref 9.4–12.3)
RBC # BLD AUTO: 4.63 M/UL (ref 4.2–5.4)
TIBC SERPL-MCNC: 407 UG/DL (ref 250–400)
VIT B12 SERPL-MCNC: 353 PG/ML (ref 232–1245)
WBC # BLD AUTO: 14.44 K/UL (ref 4.8–10.8)

## 2025-03-17 PROCEDURE — 82784 ASSAY IGA/IGD/IGG/IGM EACH: CPT

## 2025-03-17 PROCEDURE — 82728 ASSAY OF FERRITIN: CPT

## 2025-03-17 PROCEDURE — 82232 ASSAY OF BETA-2 PROTEIN: CPT

## 2025-03-17 PROCEDURE — 83521 IG LIGHT CHAINS FREE EACH: CPT

## 2025-03-17 PROCEDURE — 82746 ASSAY OF FOLIC ACID SERUM: CPT

## 2025-03-17 PROCEDURE — 99213 OFFICE O/P EST LOW 20 MIN: CPT

## 2025-03-17 PROCEDURE — 3074F SYST BP LT 130 MM HG: CPT | Performed by: PHYSICIAN ASSISTANT

## 2025-03-17 PROCEDURE — 99205 OFFICE O/P NEW HI 60 MIN: CPT | Performed by: PHYSICIAN ASSISTANT

## 2025-03-17 PROCEDURE — 36415 COLL VENOUS BLD VENIPUNCTURE: CPT

## 2025-03-17 PROCEDURE — 85025 COMPLETE CBC W/AUTO DIFF WBC: CPT

## 2025-03-17 PROCEDURE — 83615 LACTATE (LD) (LDH) ENZYME: CPT

## 2025-03-17 PROCEDURE — 86140 C-REACTIVE PROTEIN: CPT

## 2025-03-17 PROCEDURE — 83883 ASSAY NEPHELOMETRY NOT SPEC: CPT

## 2025-03-17 PROCEDURE — 83010 ASSAY OF HAPTOGLOBIN QUANT: CPT

## 2025-03-17 PROCEDURE — 82607 VITAMIN B-12: CPT

## 2025-03-17 PROCEDURE — 85652 RBC SED RATE AUTOMATED: CPT

## 2025-03-17 PROCEDURE — 84165 PROTEIN E-PHORESIS SERUM: CPT

## 2025-03-17 PROCEDURE — 86334 IMMUNOFIX E-PHORESIS SERUM: CPT

## 2025-03-17 PROCEDURE — 83550 IRON BINDING TEST: CPT

## 2025-03-17 PROCEDURE — 83540 ASSAY OF IRON: CPT

## 2025-03-17 PROCEDURE — 3078F DIAST BP <80 MM HG: CPT | Performed by: PHYSICIAN ASSISTANT

## 2025-03-17 PROCEDURE — 84155 ASSAY OF PROTEIN SERUM: CPT

## 2025-03-17 ASSESSMENT — ENCOUNTER SYMPTOMS
WHEEZING: 1
ABDOMINAL PAIN: 0
VOICE CHANGE: 0
EYE ITCHING: 0
SHORTNESS OF BREATH: 1
NAUSEA: 0
ABDOMINAL DISTENTION: 0
BLOOD IN STOOL: 0
TROUBLE SWALLOWING: 0
BACK PAIN: 1
VOMITING: 0
EYE DISCHARGE: 0
COUGH: 0
CONSTIPATION: 1
COLOR CHANGE: 0
PHOTOPHOBIA: 0
SORE THROAT: 0
DIARRHEA: 0

## 2025-03-18 DIAGNOSIS — E61.1 IRON DEFICIENCY: Primary | ICD-10-CM

## 2025-03-18 RX ORDER — FERROUS SULFATE 325(65) MG
325 TABLET ORAL
Qty: 90 TABLET | Refills: 0 | Status: SHIPPED | OUTPATIENT
Start: 2025-03-18

## 2025-03-18 NOTE — TELEPHONE ENCOUNTER
Attempted to call Mary regarding labs. NO vm, I attempted to call alternate number no vm. Will send chart msg.

## 2025-03-18 NOTE — TELEPHONE ENCOUNTER
----- Message from Allan Brooks PA-C sent at 3/17/2025  6:26 PM CDT -----  Start ferrous sulfate 325 daily

## 2025-03-19 ENCOUNTER — TELEPHONE (OUTPATIENT)
Dept: HEMATOLOGY | Age: 50
End: 2025-03-19

## 2025-03-19 LAB — B2 MICROGLOB SERPL-MCNC: 6.2 MG/L (ref 0.8–2.4)

## 2025-03-19 NOTE — TELEPHONE ENCOUNTER
Received a VM from Mary regarding where Ferrous sulfate was sent to. I attempted to call her back. VM left that RX was sent to pharmacy on file [facility].

## 2025-03-21 LAB
ALBUMIN SERPL-MCNC: 4.31 G/DL (ref 3.75–5.01)
ALPHA1 GLOB SERPL ELPH-MCNC: 0.4 G/DL (ref 0.19–0.46)
ALPHA2 GLOB SERPL ELPH-MCNC: 1.07 G/DL (ref 0.48–1.05)
B-GLOBULIN SERPL ELPH-MCNC: 1.11 G/DL (ref 0.48–1.1)
DEPRECATED KAPPA LC FREE/LAMBDA SER: 1.53 {RATIO} (ref 0.26–1.65)
EER MONOCLONAL PROTEIN AND FLC, SERUM: ABNORMAL
GAMMA GLOB SERPL ELPH-MCNC: 0.92 G/DL (ref 0.62–1.51)
IGA SERPL-MCNC: 361 MG/DL (ref 68–408)
IGG SERPL-MCNC: 809 MG/DL (ref 768–1632)
IGM SERPL-MCNC: 142 MG/DL (ref 35–263)
INTERPRETATION SERPL IFE-IMP: ABNORMAL
INTERPRETATION SERPL IFE-IMP: ABNORMAL
KAPPA LC FREE SER-MCNC: 48.85 MG/L (ref 3.3–19.4)
LAMBDA LC FREE SERPL-MCNC: 31.99 MG/L (ref 5.71–26.3)
MONOCLONAL PROTEIN, SERUM: ABNORMAL G/DL
PROT SERPL-MCNC: 7.8 G/DL (ref 6.3–8.2)

## 2025-06-10 ENCOUNTER — TELEPHONE (OUTPATIENT)
Dept: HEMATOLOGY | Age: 50
End: 2025-06-10

## 2025-06-10 NOTE — TELEPHONE ENCOUNTER
I called patient and left detailed voicemail about their appointment on 06/16/2025. I made patient aware not to arrive any earlier than the appointment time and to come at the time of the follow up not the time of the lab appointment if it is different than the follow up appt time. I also made patient aware to eat and drink plenty of water to hydrate properly before coming to these appointments because this will make their lab draw much easier. Made patient aware that we are now located at the [facility] at [address] [place] [address]. Located between [facility] and the [facility].

## 2025-06-13 ENCOUNTER — TELEPHONE (OUTPATIENT)
Dept: FAMILY MEDICINE CLINIC | Facility: CLINIC | Age: 50
End: 2025-06-13
Payer: COMMERCIAL

## 2025-06-13 RX ORDER — ACYCLOVIR 400 MG/1
1 TABLET ORAL
Qty: 9 EACH | Refills: 0 | Status: SHIPPED | OUTPATIENT
Start: 2025-06-13 | End: 2025-09-11

## 2025-06-13 NOTE — TELEPHONE ENCOUNTER
Pt states that she has been in an induced coma. While there pts transmitter was thrown away, for her glucose checks.   Pt states that she needs a refill or glucose monitor sent in for her.

## 2025-06-14 DIAGNOSIS — E61.1 IRON DEFICIENCY: Primary | ICD-10-CM

## 2025-06-14 DIAGNOSIS — D50.9 MICROCYTIC ANEMIA: ICD-10-CM

## 2025-06-16 DIAGNOSIS — D75.839 THROMBOCYTOSIS: ICD-10-CM

## 2025-06-16 DIAGNOSIS — D72.829 LEUKOCYTOSIS, UNSPECIFIED TYPE: ICD-10-CM

## 2025-06-16 DIAGNOSIS — R76.8 ELEVATED SERUM IMMUNOGLOBULIN FREE LIGHT CHAINS: Primary | ICD-10-CM

## 2025-07-18 ENCOUNTER — DOCUMENTATION (OUTPATIENT)
Age: 50
End: 2025-07-18

## 2025-07-18 ENCOUNTER — OFFICE VISIT (OUTPATIENT)
Age: 50
End: 2025-07-18
Payer: COMMERCIAL

## 2025-07-18 VITALS
OXYGEN SATURATION: 99 % | SYSTOLIC BLOOD PRESSURE: 130 MMHG | TEMPERATURE: 98.7 F | DIASTOLIC BLOOD PRESSURE: 86 MMHG | HEART RATE: 65 BPM | HEIGHT: 63 IN | BODY MASS INDEX: 47.31 KG/M2 | WEIGHT: 267 LBS

## 2025-07-18 DIAGNOSIS — J18.9 PNEUMONIA DUE TO INFECTIOUS ORGANISM, UNSPECIFIED LATERALITY, UNSPECIFIED PART OF LUNG: Primary | ICD-10-CM

## 2025-07-18 DIAGNOSIS — R53.1 GENERALIZED WEAKNESS: ICD-10-CM

## 2025-07-18 DIAGNOSIS — N39.0 URINARY TRACT INFECTION WITHOUT HEMATURIA, SITE UNSPECIFIED: ICD-10-CM

## 2025-07-18 DIAGNOSIS — R56.9 SEIZURE: ICD-10-CM

## 2025-07-18 RX ORDER — INSULIN LISPRO 100 [IU]/ML
3 INJECTION, SOLUTION INTRAVENOUS; SUBCUTANEOUS
COMMUNITY
End: 2025-07-18 | Stop reason: SDUPTHER

## 2025-07-18 RX ORDER — LOSARTAN POTASSIUM 100 MG/1
100 TABLET ORAL DAILY
Qty: 90 TABLET | Refills: 0 | Status: SHIPPED | OUTPATIENT
Start: 2025-07-18

## 2025-07-18 RX ORDER — ALBUTEROL SULFATE 90 UG/1
2 INHALANT RESPIRATORY (INHALATION)
COMMUNITY
End: 2025-07-18 | Stop reason: SDUPTHER

## 2025-07-18 RX ORDER — LEVETIRACETAM 1000 MG/1
1 TABLET ORAL EVERY 12 HOURS SCHEDULED
COMMUNITY
Start: 2025-06-10 | End: 2025-07-18 | Stop reason: SDUPTHER

## 2025-07-18 RX ORDER — PANTOPRAZOLE SODIUM 40 MG/1
40 TABLET, DELAYED RELEASE ORAL DAILY
Qty: 90 TABLET | Refills: 0 | Status: SHIPPED | OUTPATIENT
Start: 2025-07-18

## 2025-07-18 RX ORDER — BUSPIRONE HYDROCHLORIDE 5 MG/1
0.5 TABLET ORAL 2 TIMES DAILY
COMMUNITY
Start: 2025-03-10 | End: 2025-07-18 | Stop reason: SDUPTHER

## 2025-07-18 RX ORDER — LOSARTAN POTASSIUM 100 MG/1
100 TABLET ORAL DAILY
COMMUNITY
End: 2025-07-18 | Stop reason: SDUPTHER

## 2025-07-18 RX ORDER — GLIPIZIDE 10 MG/1
10 TABLET ORAL DAILY
Qty: 90 TABLET | Refills: 0 | Status: SHIPPED | OUTPATIENT
Start: 2025-07-18

## 2025-07-18 RX ORDER — SEMAGLUTIDE 0.68 MG/ML
0.5 INJECTION, SOLUTION SUBCUTANEOUS WEEKLY
COMMUNITY
Start: 2025-04-07 | End: 2025-07-18 | Stop reason: SDUPTHER

## 2025-07-18 RX ORDER — GABAPENTIN 400 MG/1
400 CAPSULE ORAL 3 TIMES DAILY
COMMUNITY

## 2025-07-18 RX ORDER — FERROUS SULFATE 325(65) MG
325 TABLET ORAL
Qty: 30 TABLET | Refills: 0 | Status: SHIPPED | OUTPATIENT
Start: 2025-07-18

## 2025-07-18 RX ORDER — PROCHLORPERAZINE 25 MG/1
SUPPOSITORY RECTAL SEE ADMIN INSTRUCTIONS
COMMUNITY
Start: 2025-06-25

## 2025-07-18 RX ORDER — ATORVASTATIN CALCIUM 40 MG/1
40 TABLET, FILM COATED ORAL NIGHTLY
Qty: 90 TABLET | Refills: 0 | Status: SHIPPED | OUTPATIENT
Start: 2025-07-18

## 2025-07-18 RX ORDER — INSULIN GLARGINE 100 [IU]/ML
48 INJECTION, SOLUTION SUBCUTANEOUS NIGHTLY
COMMUNITY
Start: 2025-04-07

## 2025-07-18 RX ORDER — SERTRALINE HYDROCHLORIDE 100 MG/1
100 TABLET, FILM COATED ORAL DAILY
Qty: 90 TABLET | Refills: 0 | Status: SHIPPED | OUTPATIENT
Start: 2025-07-18

## 2025-07-18 RX ORDER — DAPAGLIFLOZIN 10 MG/1
1 TABLET, FILM COATED ORAL DAILY
COMMUNITY
End: 2025-07-18 | Stop reason: SDUPTHER

## 2025-07-18 RX ORDER — HYDROXYZINE HYDROCHLORIDE 50 MG/1
50 TABLET, FILM COATED ORAL 3 TIMES DAILY
Qty: 270 TABLET | Refills: 0 | Status: SHIPPED | OUTPATIENT
Start: 2025-07-18

## 2025-07-18 RX ORDER — METOPROLOL TARTRATE 100 MG/1
100 TABLET ORAL 2 TIMES DAILY
COMMUNITY
Start: 2025-04-18 | End: 2025-07-18 | Stop reason: SDUPTHER

## 2025-07-18 RX ORDER — APIXABAN 5 MG/1
5 TABLET, FILM COATED ORAL 2 TIMES DAILY
COMMUNITY
End: 2025-07-18 | Stop reason: SDUPTHER

## 2025-07-18 RX ORDER — DAPAGLIFLOZIN 10 MG/1
1 TABLET, FILM COATED ORAL DAILY
Qty: 90 TABLET | Refills: 0 | Status: SHIPPED | OUTPATIENT
Start: 2025-07-18

## 2025-07-18 RX ORDER — BUSPIRONE HYDROCHLORIDE 5 MG/1
2.5 TABLET ORAL 2 TIMES DAILY
Qty: 90 TABLET | Refills: 0 | Status: SHIPPED | OUTPATIENT
Start: 2025-07-18

## 2025-07-18 RX ORDER — METOPROLOL TARTRATE 100 MG/1
100 TABLET ORAL 2 TIMES DAILY
Qty: 180 TABLET | Refills: 0 | Status: SHIPPED | OUTPATIENT
Start: 2025-07-18

## 2025-07-18 RX ORDER — GLIPIZIDE 10 MG/1
10 TABLET ORAL DAILY
COMMUNITY
Start: 2025-03-10 | End: 2025-07-18 | Stop reason: SDUPTHER

## 2025-07-18 RX ORDER — INSULIN LISPRO 100 [IU]/ML
3 INJECTION, SOLUTION INTRAVENOUS; SUBCUTANEOUS
Qty: 810 UNITS | Refills: 0 | Status: SHIPPED | OUTPATIENT
Start: 2025-07-18

## 2025-07-18 RX ORDER — APIXABAN 5 MG/1
5 TABLET, FILM COATED ORAL 2 TIMES DAILY
Qty: 180 TABLET | Refills: 0 | Status: SHIPPED | OUTPATIENT
Start: 2025-07-18

## 2025-07-18 RX ORDER — LEVETIRACETAM 1000 MG/1
1000 TABLET ORAL EVERY 12 HOURS SCHEDULED
Qty: 60 TABLET | Refills: 0 | Status: SHIPPED | OUTPATIENT
Start: 2025-07-18

## 2025-07-18 RX ORDER — ALBUTEROL SULFATE 90 UG/1
2 INHALANT RESPIRATORY (INHALATION)
Qty: 18 G | Refills: 2 | Status: SHIPPED | OUTPATIENT
Start: 2025-07-18

## 2025-07-18 RX ORDER — ATORVASTATIN CALCIUM 40 MG/1
40 TABLET, FILM COATED ORAL NIGHTLY
COMMUNITY
Start: 2025-03-10 | End: 2025-07-18 | Stop reason: SDUPTHER

## 2025-07-18 RX ORDER — BUDESONIDE AND FORMOTEROL FUMARATE DIHYDRATE 160; 4.5 UG/1; UG/1
2 AEROSOL RESPIRATORY (INHALATION)
Qty: 10.2 G | Refills: 2 | Status: SHIPPED | OUTPATIENT
Start: 2025-07-18

## 2025-07-18 RX ORDER — FUROSEMIDE 40 MG/1
40 TABLET ORAL DAILY
COMMUNITY
Start: 2025-03-10 | End: 2025-04-09

## 2025-07-18 RX ORDER — HYDROXYZINE HYDROCHLORIDE 50 MG/1
50 TABLET, FILM COATED ORAL 3 TIMES DAILY
COMMUNITY
End: 2025-07-18 | Stop reason: SDUPTHER

## 2025-07-18 RX ORDER — SEMAGLUTIDE 0.68 MG/ML
0.5 INJECTION, SOLUTION SUBCUTANEOUS WEEKLY
Qty: 9 ML | Refills: 0 | Status: SHIPPED | OUTPATIENT
Start: 2025-07-18

## 2025-07-18 RX ORDER — TRAZODONE HYDROCHLORIDE 100 MG/1
100 TABLET ORAL DAILY
Qty: 90 TABLET | Refills: 0 | Status: SHIPPED | OUTPATIENT
Start: 2025-07-18

## 2025-07-18 RX ORDER — CETIRIZINE HYDROCHLORIDE 10 MG/1
10 TABLET ORAL DAILY
Qty: 90 TABLET | Refills: 0 | Status: SHIPPED | OUTPATIENT
Start: 2025-07-18

## 2025-07-18 NOTE — TELEPHONE ENCOUNTER
Caller: Lucille Peña    Relationship: Self    Best call back number:    Telephone Information:   Mobile [phone]        Requested Prescriptions:   Requested Prescriptions     Pending Prescriptions Disp Refills    levETIRAcetam (KEPPRA) 1000 MG tablet       Sig: Take 1 tablet by mouth Every 12 (Twelve) Hours.        Pharmacy where request should be sent: [facility] [address] [place] [address] [phone] [facility] [phone]      Last office visit with prescribing clinician: 7/18/2025   Last telemedicine visit with prescribing clinician: Visit date not found   Next office visit with prescribing clinician: Visit date not found     Additional details provided by patient:      Does the patient have less than a 3 day supply:  [x] Yes  [] No         Loulou Tejeda Rep   07/18/25 14:20 CDT

## 2025-07-18 NOTE — PROGRESS NOTES
Pt will return to office sometime on Monday, 07/21/25 to have labs drawn, per CORDELIA Johnston.  Pt unsure what time she will be able to come in due to having to use transit.    Pt will need labs and POC UA, if possible.

## 2025-07-18 NOTE — PROGRESS NOTES
Hospital follow-up for   Chief Complaint   Patient presents with    Seizures    Pneumonia    Hospital Follow Up Visit        History:  Lucille Peña is a 49 y.o. female who presents today for transitional care management after hospitalization.    Date of Discharge: 6/10/2025  TCM call successfully made on: 6/10/25 by Madelyn Young    Patient presents to the clinic today posthospital follow-up from Eva 10.  Patient was admitted for seizure and a UTI.  Patient was put on Keppra 1000 mg twice a day to take at 9 AM and 9 PM.  States that she has not had any more seizures since discharge.  Patient has had 1 more seizure before all this happened and her mother has a history of epilepsy.  She was supposed to see Dr. Melgar but has never heard anything from the hospital on scheduling that appointment.  She denies any blurry vision or seizure-like activity at this time but does have headaches and a tremor on the left side.    Patient was readmitted into the hospital after that visit for pneumonia as she does have a history of asthma and is a high risk.  She was treated with antibiotics and steroids and discharged and denies any shortness of breath, chest pain, increased work of breathing, fever, cough at this time.  She has not seen Dr. Reyes the pulmonologist since discharge as well and will need a referral to see him.  She states that she has an appointment with Dr. Ziegler with cardiology at [facility] next week to follow-up with them from hospital visit be sure all is well.      Current Outpatient Medications:     albuterol sulfate HFA (Ventolin HFA) 108 (90 Base) MCG/ACT inhaler, Inhale 2 puffs 4 (Four) Times a Day., Disp: 18 g, Rfl: 2    atorvastatin (LIPITOR) 40 MG tablet, Take 1 tablet by mouth Every Night., Disp: 90 tablet, Rfl: 0    budesonide-formoterol (SYMBICORT) 160-4.5 MCG/ACT inhaler, Inhale 2 puffs 2 (Two) Times a Day., Disp: 10.2 g, Rfl: 2    busPIRone (BUSPAR) 5 MG tablet, Take 0.5  tablets by mouth 2 (Two) Times a Day., Disp: 90 tablet, Rfl: 0    cetirizine (ZyrTEC Allergy) 10 MG tablet, Take 1 tablet by mouth Daily., Disp: 90 tablet, Rfl: 0    Continuous Glucose Transmitter (Dexcom G6 Transmitter) misc, See Admin Instructions., Disp: , Rfl:     dapagliflozin Propanediol (Farxiga) 10 MG tablet, Take 10 mg by mouth Daily., Disp: 90 tablet, Rfl: 0    Eliquis 5 MG tablet tablet, Take 1 tablet by mouth 2 (Two) Times a Day., Disp: 180 tablet, Rfl: 0    ferrous sulfate 325 (65 FE) MG tablet, Take 1 tablet by mouth Daily With Breakfast., Disp: 30 tablet, Rfl: 0    gabapentin (NEURONTIN) 400 MG capsule, Take 1 capsule by mouth 3 (Three) Times a Day., Disp: , Rfl:     glipizide (GLUCOTROL) 10 MG tablet, Take 1 tablet by mouth Daily., Disp: 90 tablet, Rfl: 0    hydrOXYzine (ATARAX) 50 MG tablet, Take 1 tablet by mouth 3 (Three) Times a Day., Disp: 270 tablet, Rfl: 0    Insulin Lispro (humaLOG) 100 UNIT/ML injection, Inject 3 Units under the skin into the appropriate area as directed 3 (Three) Times a Day Before Meals., Disp: 810 Units, Rfl: 0    Lantus SoloStar 100 UNIT/ML injection pen, Inject 48 Units under the skin into the appropriate area as directed Every Night., Disp: , Rfl:     levETIRAcetam (KEPPRA) 1000 MG tablet, Take 1 tablet by mouth Every 12 (Twelve) Hours., Disp: , Rfl:     losartan (COZAAR) 100 MG tablet, Take 1 tablet by mouth Daily., Disp: 90 tablet, Rfl: 0    metoprolol tartrate (LOPRESSOR) 100 MG tablet, Take 1 tablet by mouth 2 (Two) Times a Day., Disp: 180 tablet, Rfl: 0    Ozempic, 0.25 or 0.5 MG/DOSE, 2 MG/3ML solution pen-injector, Inject 0.5 mg under the skin into the appropriate area as directed 1 (One) Time Per Week., Disp: 9 mL, Rfl: 0    pantoprazole (PROTONIX) 40 MG EC tablet, Take 1 tablet by mouth Daily., Disp: 90 tablet, Rfl: 0    sertraline (ZOLOFT) 100 MG tablet, Take 1 tablet by mouth Daily., Disp: 90 tablet, Rfl: 0    tiZANidine (ZANAFLEX) 4 MG tablet, Take 1 tablet  "by mouth 3 (Three) Times a Day As Needed for Muscle Spasms., Disp: 120 tablet, Rfl: 0    traZODone (DESYREL) 100 MG tablet, Take 1 tablet by mouth Daily., Disp: 90 tablet, Rfl: 0    Continuous Glucose Sensor (Dexcom G7 Sensor) misc, Use 1 Units Every 10 (Ten) Days for 90 days. (Patient not taking: Reported on 7/18/2025), Disp: 9 each, Rfl: 0    OBJECTIVE:  /86 (BP Location: Left arm, Patient Position: Sitting, Cuff Size: Large Adult)   Pulse 65   Temp 98.7 °F (37.1 °C) (Temporal)   Ht 160 cm (63\")   Wt 121 kg (267 lb)   SpO2 99%   BMI 47.30 kg/m²      Physical Exam  Vitals and nursing note reviewed.   Constitutional:       General: She is awake.      Appearance: Normal appearance. She is well-developed and well-groomed. She is morbidly obese.   HENT:      Head: Normocephalic and atraumatic.      Right Ear: Hearing, tympanic membrane, ear canal and external ear normal.      Left Ear: Hearing, tympanic membrane, ear canal and external ear normal.      Nose: Nose normal.      Mouth/Throat:      Lips: Pink.      Mouth: Mucous membranes are moist.      Pharynx: Oropharynx is clear.   Eyes:      General: Lids are normal.      Extraocular Movements: Extraocular movements intact.      Conjunctiva/sclera: Conjunctivae normal.      Pupils: Pupils are equal, round, and reactive to light.   Neck:      Trachea: Trachea normal.   Cardiovascular:      Rate and Rhythm: Normal rate and regular rhythm.      Heart sounds: Normal heart sounds, S1 normal and S2 normal.   Pulmonary:      Effort: Pulmonary effort is normal.      Breath sounds: Normal breath sounds.   Musculoskeletal:      Cervical back: Full passive range of motion without pain, normal range of motion and neck supple.   Skin:     General: Skin is warm.      Capillary Refill: Capillary refill takes less than 2 seconds.   Neurological:      Mental Status: She is alert and oriented to person, place, and time.      Cranial Nerves: Cranial nerves 2-12 are intact. "      Sensory: Sensation is intact.      Motor: Weakness and tremor present.      Gait: Gait abnormal.   Psychiatric:         Attention and Perception: Attention and perception normal.         Mood and Affect: Mood and affect normal.         Speech: Speech normal.         Behavior: Behavior normal. Behavior is cooperative.         Thought Content: Thought content normal.         Cognition and Memory: Cognition and memory normal.         Judgment: Judgment normal.         Result Review :                Assessment and Plan   Diagnoses and all orders for this visit:    1. Pneumonia due to infectious organism, unspecified laterality, unspecified part of lung (Primary)  -     Ambulatory Referral to Pulmonology  -     CBC w AUTO Differential  -     Comprehensive metabolic panel    2. Seizure  -     Ambulatory Referral to Neurology  -     Levetiracetam Level (Keppra)    3. Urinary tract infection without hematuria, site unspecified  -     POC Urinalysis Dipstick    4. Generalized weakness  -     Ambulatory Referral to Physical Therapy for Evaluation & Treatment    Other orders  -     albuterol sulfate HFA (Ventolin HFA) 108 (90 Base) MCG/ACT inhaler; Inhale 2 puffs 4 (Four) Times a Day.  Dispense: 18 g; Refill: 2  -     atorvastatin (LIPITOR) 40 MG tablet; Take 1 tablet by mouth Every Night.  Dispense: 90 tablet; Refill: 0  -     budesonide-formoterol (SYMBICORT) 160-4.5 MCG/ACT inhaler; Inhale 2 puffs 2 (Two) Times a Day.  Dispense: 10.2 g; Refill: 2  -     busPIRone (BUSPAR) 5 MG tablet; Take 0.5 tablets by mouth 2 (Two) Times a Day.  Dispense: 90 tablet; Refill: 0  -     cetirizine (ZyrTEC Allergy) 10 MG tablet; Take 1 tablet by mouth Daily.  Dispense: 90 tablet; Refill: 0  -     dapagliflozin Propanediol (Farxiga) 10 MG tablet; Take 10 mg by mouth Daily.  Dispense: 90 tablet; Refill: 0  -     Eliquis 5 MG tablet tablet; Take 1 tablet by mouth 2 (Two) Times a Day.  Dispense: 180 tablet; Refill: 0  -     ferrous sulfate  325 (65 FE) MG tablet; Take 1 tablet by mouth Daily With Breakfast.  Dispense: 30 tablet; Refill: 0  -     glipizide (GLUCOTROL) 10 MG tablet; Take 1 tablet by mouth Daily.  Dispense: 90 tablet; Refill: 0  -     hydrOXYzine (ATARAX) 50 MG tablet; Take 1 tablet by mouth 3 (Three) Times a Day.  Dispense: 270 tablet; Refill: 0  -     Insulin Lispro (humaLOG) 100 UNIT/ML injection; Inject 3 Units under the skin into the appropriate area as directed 3 (Three) Times a Day Before Meals.  Dispense: 810 Units; Refill: 0  -     losartan (COZAAR) 100 MG tablet; Take 1 tablet by mouth Daily.  Dispense: 90 tablet; Refill: 0  -     metoprolol tartrate (LOPRESSOR) 100 MG tablet; Take 1 tablet by mouth 2 (Two) Times a Day.  Dispense: 180 tablet; Refill: 0  -     Ozempic, 0.25 or 0.5 MG/DOSE, 2 MG/3ML solution pen-injector; Inject 0.5 mg under the skin into the appropriate area as directed 1 (One) Time Per Week.  Dispense: 9 mL; Refill: 0  -     pantoprazole (PROTONIX) 40 MG EC tablet; Take 1 tablet by mouth Daily.  Dispense: 90 tablet; Refill: 0  -     sertraline (ZOLOFT) 100 MG tablet; Take 1 tablet by mouth Daily.  Dispense: 90 tablet; Refill: 0  -     tiZANidine (ZANAFLEX) 4 MG tablet; Take 1 tablet by mouth 3 (Three) Times a Day As Needed for Muscle Spasms.  Dispense: 120 tablet; Refill: 0  -     traZODone (DESYREL) 100 MG tablet; Take 1 tablet by mouth Daily.  Dispense: 90 tablet; Refill: 0      Patient presents to the clinic today for posthospital follow-up her UTI, acute seizure, and pneumonia.  There were 2 different hospital stays where we contacted patient after the first day on Eva 10, 2025.  Patient has not seen pulmonologist or neurology yet at this point.  I am going to put in referrals for both of those at this time so that she can continue to follow-up and be sure all is improving with them.  She has not had any more seizure activity at this time on the Keppra twice a day however she did have a seizure before this  1 other time in the past and her mother has history of epilepsy so she is for sure needs to be followed by neurology moving forward.  Patient is not having any shortness of breath chronic cough fevers her lungs do sound clear today no acute distress.  I have no concern as follow-up for a repeat chest x-ray is needed at this time.  I have put in a few labs to check her white blood cells kidney electrolytes and her Keppra level to be sure all is still stable from post follow-up hospital.  I want her to continue to work on improving her diet for a well-balanced nutritious diet, drinking plenty of fluids, resting, and we are going to refer her to physical therapy to gather her strength as she is showing some weakness from the hospital visits at this time.  I did repeat a urinalysis on her today in the clinic as well to be sure the UTI was clearing and we will send off for culture to be safe as well.  If at any point symptoms worsen or return return the clinic and if severe go to the ER or call 911 as discussed with her.    Patient is requesting refills on medications at this time which I will do for her.     PHQ2 screening score 0 at this time. No concern with patient for depression.          An After Visit Summary was printed and given to the patient at discharge.  Return in about 3 months (around 10/18/2025) for Next scheduled follow up. Sooner if problems arise.         There are no Patient Instructions on file for this visit.    Discussion: 40 minutes today in the clinic       CORDELIA Lamar  7/18/2025

## 2025-07-31 ENCOUNTER — TELEPHONE (OUTPATIENT)
Dept: HEMATOLOGY | Age: 50
End: 2025-07-31

## 2025-07-31 NOTE — TELEPHONE ENCOUNTER
I called patient and left detailed voicemail about their appointment on 08/05/2025. I made patient aware not to arrive any earlier than the appointment time and to come at the time of the follow up not the time of the lab appointment if it is different than the follow up appt time. I also made patient aware to eat and drink plenty of water to hydrate properly before coming to these appointments because this will make their lab draw much easier incase labs are needed anytime throughout their visit. Made patient aware that we are now located at the [facility] at [address]. Located between [facility] and the [facility]. I also ask that if they are not able to make it to their appointment to please give us a call at [phone] to reschedule.

## 2025-08-04 DIAGNOSIS — D75.839 THROMBOCYTOSIS: ICD-10-CM

## 2025-08-04 DIAGNOSIS — E61.1 IRON DEFICIENCY: Primary | ICD-10-CM

## 2025-08-04 DIAGNOSIS — D50.9 MICROCYTIC ANEMIA: ICD-10-CM

## 2025-08-04 DIAGNOSIS — R76.8 ELEVATED SERUM IMMUNOGLOBULIN FREE LIGHT CHAINS: ICD-10-CM

## 2025-08-04 DIAGNOSIS — D72.829 LEUKOCYTOSIS, UNSPECIFIED TYPE: ICD-10-CM

## 2025-08-06 ENCOUNTER — TELEPHONE (OUTPATIENT)
Age: 50
End: 2025-08-06
Payer: COMMERCIAL

## 2025-08-18 ENCOUNTER — TELEPHONE (OUTPATIENT)
Age: 50
End: 2025-08-18
Payer: COMMERCIAL

## 2025-08-18 DIAGNOSIS — J44.9 CHRONIC OBSTRUCTIVE PULMONARY DISEASE, UNSPECIFIED COPD TYPE: Primary | ICD-10-CM

## 2025-08-18 DIAGNOSIS — G89.4 CHRONIC PAIN SYNDROME: ICD-10-CM

## 2025-08-18 DIAGNOSIS — R56.9 SEIZURE: Primary | ICD-10-CM

## 2025-08-18 RX ORDER — LEVETIRACETAM 1000 MG/1
1000 TABLET ORAL EVERY 12 HOURS SCHEDULED
Qty: 60 TABLET | Refills: 0 | Status: CANCELLED | OUTPATIENT
Start: 2025-08-18

## 2025-08-18 RX ORDER — GABAPENTIN 400 MG/1
400 CAPSULE ORAL 3 TIMES DAILY
Status: CANCELLED | OUTPATIENT
Start: 2025-08-18

## 2025-08-18 RX ORDER — DAPAGLIFLOZIN 10 MG/1
10 TABLET, FILM COATED ORAL DAILY
Qty: 90 TABLET | Refills: 0 | Status: SHIPPED | OUTPATIENT
Start: 2025-08-18

## 2025-08-18 RX ORDER — BUDESONIDE AND FORMOTEROL FUMARATE DIHYDRATE 160; 4.5 UG/1; UG/1
2 AEROSOL RESPIRATORY (INHALATION)
Qty: 10.2 G | Refills: 2 | Status: SHIPPED | OUTPATIENT
Start: 2025-08-18

## 2025-08-19 RX ORDER — GABAPENTIN 400 MG/1
400 CAPSULE ORAL 3 TIMES DAILY
Qty: 90 CAPSULE | Refills: 0 | Status: SHIPPED | OUTPATIENT
Start: 2025-08-19

## 2025-08-19 RX ORDER — LEVETIRACETAM 1000 MG/1
1000 TABLET ORAL EVERY 12 HOURS SCHEDULED
Qty: 60 TABLET | Refills: 0 | Status: SHIPPED | OUTPATIENT
Start: 2025-08-19

## (undated) DEVICE — DRESSING FOAM W10XL10CM ABSRB ANTIMIC SAFETAC TECHNOLOGY

## (undated) DEVICE — SUTURE PERMAHAND SZ 2-0 L18IN NONABSORBABLE BLK L26MM FS 685G

## (undated) DEVICE — SOLUTION IV 500ML 0.9% SOD CHL PH 5 INJ USP VIAFLX PLAS

## (undated) DEVICE — JACKSON-PRATT 100CC BULB RESERVOIR: Brand: CARDINAL HEALTH

## (undated) DEVICE — LIQUIBAND RAPID ADHESIVE 36/CS 0.8ML: Brand: MEDLINE

## (undated) DEVICE — TOWEL,OR,DSP,ST,BLUE,DLX,4/PK,20PK/CS: Brand: MEDLINE

## (undated) DEVICE — SUTURE VICRYL + SZ 4-0 L18IN ABSRB WHT TIE POLYGLACTIN 910 VCP109G

## (undated) DEVICE — STOPCOCK IV PRIMING 0.26ML 3 W W/ TWO FEM LUERLOK PRT 1

## (undated) DEVICE — SOLUTION IV 100ML 0.9% SOD CHL PLAS CONT USP VIAFLX 1 PER

## (undated) DEVICE — SUTURE ABSORBABLE MONOFILAMENT 3-0 SH 27 IN UD PDS + PDP416H

## (undated) DEVICE — SURGICAL PROCEDURE PACK VASC LOURDES HOSP

## (undated) DEVICE — SUTURE NONABSORBABLE MONOFILAMENT 7-0 BV-1 1X24 IN PROLENE 8702H

## (undated) DEVICE — GLOVE SURG SZ 7 L12IN FNGR THK79MIL GRN LTX FREE

## (undated) DEVICE — Device: Brand: JELCO

## (undated) DEVICE — SUTURE MONOCRYL SZ 4-0 L18IN ABSRB UD L19MM PS-2 3/8 CIR PRIM Y496G

## (undated) DEVICE — BLANKET WRM W40.2XL55.9IN IORT LO BODY + MISTRAL AIR

## (undated) DEVICE — DRAIN WND SIL FLAT RADIOPAQUE 10MM FULL FLUTED

## (undated) DEVICE — SUTURE PROL SZ 6-0 L30IN NONABSORBABLE BLU L9.3MM BV-1 3/8 M8709

## (undated) DEVICE — SUTURE PERMA-HAND SZ 2-0 L30IN NONABSORBABLE BLK L26MM SH K833H

## (undated) DEVICE — 3M™ STERI-DRAPE™ INSTRUMENT POUCH 1018: Brand: STERI-DRAPE™

## (undated) DEVICE — 3M™ IOBAN™ 2 ANTIMICROBIAL INCISE DRAPE 6650EZ: Brand: IOBAN™ 2

## (undated) DEVICE — C-ARM: Brand: UNBRANDED

## (undated) DEVICE — PENCIL BTTN S S CAUT TIP W HOLSTER 25 50

## (undated) DEVICE — TOTAL TRAY, 16FR 10ML SIL FOLEY, URN: Brand: MEDLINE

## (undated) DEVICE — SUTURE VICRYL + SZ 2-0 L18IN ABSRB CLR TIE POLYGLACTIN 910 VCP111G

## (undated) DEVICE — SUTURE VICRYL + ABSORBABLE BRAIDED 3-0 12X18 IN COAT UD VCP110G